# Patient Record
Sex: FEMALE | Race: BLACK OR AFRICAN AMERICAN | NOT HISPANIC OR LATINO | Employment: PART TIME | URBAN - METROPOLITAN AREA
[De-identification: names, ages, dates, MRNs, and addresses within clinical notes are randomized per-mention and may not be internally consistent; named-entity substitution may affect disease eponyms.]

---

## 2020-09-15 ENCOUNTER — HISTORICAL (OUTPATIENT)
Dept: ADMINISTRATIVE | Facility: HOSPITAL | Age: 50
End: 2020-09-15

## 2020-09-15 LAB
ABS NEUT (OLG): 6.07 X10(3)/MCL (ref 2.1–9.2)
ALBUMIN SERPL-MCNC: 3.5 GM/DL (ref 3.4–5)
ALBUMIN/GLOB SERPL: 0.8 RATIO (ref 1.1–2)
ALP SERPL-CCNC: 89 UNIT/L (ref 45–117)
ALT SERPL-CCNC: 21 UNIT/L (ref 12–78)
APPEARANCE, UA: CLEAR
AST SERPL-CCNC: 16 UNIT/L (ref 15–37)
BACTERIA #/AREA URNS AUTO: ABNORMAL /HPF
BASOPHILS # BLD AUTO: 0 X10(3)/MCL (ref 0–0.2)
BASOPHILS NFR BLD AUTO: 0 %
BILIRUB SERPL-MCNC: 0.3 MG/DL (ref 0.2–1)
BILIRUB UR QL STRIP: NEGATIVE
BILIRUBIN DIRECT+TOT PNL SERPL-MCNC: 0.1 MG/DL (ref 0–0.2)
BILIRUBIN DIRECT+TOT PNL SERPL-MCNC: 0.2 MG/DL
BUN SERPL-MCNC: 9 MG/DL (ref 7–18)
CALCIUM SERPL-MCNC: 8.8 MG/DL (ref 8.5–10.1)
CHLORIDE SERPL-SCNC: 106 MMOL/L (ref 98–107)
CHOLEST SERPL-MCNC: 202 MG/DL
CHOLEST/HDLC SERPL: 4 {RATIO} (ref 0–4.4)
CO2 SERPL-SCNC: 26 MMOL/L (ref 21–32)
COLOR UR: NORMAL
CREAT SERPL-MCNC: 0.8 MG/DL (ref 0.6–1.3)
EOSINOPHIL # BLD AUTO: 0.2 X10(3)/MCL (ref 0–0.9)
EOSINOPHIL NFR BLD AUTO: 2 %
ERYTHROCYTE [DISTWIDTH] IN BLOOD BY AUTOMATED COUNT: 14.5 % (ref 11.5–14.5)
EST. AVERAGE GLUCOSE BLD GHB EST-MCNC: 126 MG/DL
GLOBULIN SER-MCNC: 4.3 GM/ML (ref 2.3–3.5)
GLUCOSE (UA): NEGATIVE
GLUCOSE SERPL-MCNC: 114 MG/DL (ref 74–106)
HAV IGM SERPL QL IA: NONREACTIVE
HBA1C MFR BLD: 6 % (ref 4.2–6.3)
HBV CORE IGM SERPL QL IA: NONREACTIVE
HBV SURFACE AG SERPL QL IA: NONREACTIVE
HCT VFR BLD AUTO: 41.3 % (ref 35–46)
HCV AB SERPL QL IA: NONREACTIVE
HDLC SERPL-MCNC: 50 MG/DL (ref 40–59)
HGB BLD-MCNC: 13.2 GM/DL (ref 12–16)
HGB UR QL STRIP: NEGATIVE
HIV 1+2 AB+HIV1 P24 AG SERPL QL IA: NONREACTIVE
HYALINE CASTS #/AREA URNS LPF: ABNORMAL /LPF
IMM GRANULOCYTES # BLD AUTO: 0.03 10*3/UL
IMM GRANULOCYTES NFR BLD AUTO: 0 %
KETONES UR QL STRIP: NEGATIVE
LDLC SERPL CALC-MCNC: 126 MG/DL
LEUKOCYTE ESTERASE UR QL STRIP: NEGATIVE
LYMPHOCYTES # BLD AUTO: 3.1 X10(3)/MCL (ref 0.6–4.6)
LYMPHOCYTES NFR BLD AUTO: 31 %
MCH RBC QN AUTO: 27.1 PG (ref 26–34)
MCHC RBC AUTO-ENTMCNC: 32 GM/DL (ref 31–37)
MCV RBC AUTO: 84.8 FL (ref 80–100)
MONOCYTES # BLD AUTO: 0.5 X10(3)/MCL (ref 0.1–1.3)
MONOCYTES NFR BLD AUTO: 5 %
NEUTROPHILS # BLD AUTO: 6.07 X10(3)/MCL (ref 2.1–9.2)
NEUTROPHILS NFR BLD AUTO: 61 %
NITRITE UR QL STRIP: NEGATIVE
PH UR STRIP: 5.5 [PH] (ref 4.5–8)
PLATELET # BLD AUTO: 219 X10(3)/MCL (ref 130–400)
PMV BLD AUTO: 11.5 FL (ref 7.4–10.4)
POTASSIUM SERPL-SCNC: 4.2 MMOL/L (ref 3.5–5.1)
PROT SERPL-MCNC: 7.8 GM/DL (ref 6.4–8.2)
PROT UR QL STRIP: NEGATIVE
RBC # BLD AUTO: 4.87 X10(6)/MCL (ref 4–5.2)
RBC #/AREA URNS AUTO: ABNORMAL /HPF
SODIUM SERPL-SCNC: 140 MMOL/L (ref 136–145)
SP GR UR STRIP: 1.01 (ref 1–1.03)
SQUAMOUS #/AREA URNS LPF: ABNORMAL /LPF
T PALLIDUM AB SER QL: NONREACTIVE
TRIGL SERPL-MCNC: 132 MG/DL
TSH SERPL-ACNC: 1.63 MIU/L (ref 0.36–3.74)
UROBILINOGEN UR STRIP-ACNC: NORMAL
VLDLC SERPL CALC-MCNC: 26 MG/DL
WBC # SPEC AUTO: 9.9 X10(3)/MCL (ref 4.5–11)
WBC #/AREA URNS AUTO: ABNORMAL /HPF

## 2020-12-01 ENCOUNTER — HISTORICAL (OUTPATIENT)
Dept: RADIOLOGY | Facility: HOSPITAL | Age: 50
End: 2020-12-01

## 2020-12-01 LAB
APPEARANCE, UA: CLEAR
BACTERIA #/AREA URNS AUTO: ABNORMAL /HPF
BILIRUB UR QL STRIP: NEGATIVE
BUN SERPL-MCNC: 10 MG/DL (ref 7–18.7)
CALCIUM SERPL-MCNC: 9.4 MG/DL (ref 8.4–10.2)
CHLORIDE SERPL-SCNC: 105 MMOL/L (ref 98–107)
CO2 SERPL-SCNC: 25 MMOL/L (ref 22–29)
COLOR UR: ABNORMAL
CREAT SERPL-MCNC: 0.85 MG/DL (ref 0.55–1.02)
CREAT/UREA NIT SERPL: 12
GLUCOSE (UA): NEGATIVE
GLUCOSE SERPL-MCNC: 113 MG/DL (ref 74–100)
HGB UR QL STRIP: NEGATIVE
HYALINE CASTS #/AREA URNS LPF: ABNORMAL /LPF
KETONES UR QL STRIP: NEGATIVE
LEUKOCYTE ESTERASE UR QL STRIP: 75 LEU/UL
NITRITE UR QL STRIP: NEGATIVE
PH UR STRIP: 5.5 [PH] (ref 4.5–8)
POTASSIUM SERPL-SCNC: 4.3 MMOL/L (ref 3.5–5.1)
PROT UR QL STRIP: NEGATIVE
RBC #/AREA URNS AUTO: ABNORMAL /HPF
SODIUM SERPL-SCNC: 138 MMOL/L (ref 136–145)
SP GR UR STRIP: 1.02 (ref 1–1.03)
SQUAMOUS #/AREA URNS LPF: >100 /LPF
UROBILINOGEN UR STRIP-ACNC: NORMAL
WBC #/AREA URNS AUTO: ABNORMAL /HPF

## 2020-12-03 LAB — FINAL CULTURE: NORMAL

## 2021-02-24 LAB
CHLAMYDIA TRACHOMATIS (PRECISION): NEGATIVE
HIGH RISK HPV 16 (PRECISION): NEGATIVE
HIGH RISK HPV 18/45 (PRECISION): NEGATIVE
NEISSERIA GONORRHOEAE (PRECISION): NEGATIVE
PAP RECOMMENDATION EXT: NORMAL
PAP SMEAR: NORMAL

## 2021-03-19 ENCOUNTER — HISTORICAL (OUTPATIENT)
Dept: RADIOLOGY | Facility: HOSPITAL | Age: 51
End: 2021-03-19

## 2021-06-01 ENCOUNTER — HISTORICAL (OUTPATIENT)
Dept: ADMINISTRATIVE | Facility: HOSPITAL | Age: 51
End: 2021-06-01

## 2021-06-01 LAB
ABS NEUT (OLG): 4.85 X10(3)/MCL (ref 2.1–9.2)
BASOPHILS # BLD AUTO: 0 X10(3)/MCL (ref 0–0.2)
BASOPHILS NFR BLD AUTO: 0 %
BUN SERPL-MCNC: 9.5 MG/DL (ref 9.8–20.1)
CALCIUM SERPL-MCNC: 9.2 MG/DL (ref 8.4–10.2)
CHLORIDE SERPL-SCNC: 106 MMOL/L (ref 98–107)
CO2 SERPL-SCNC: 25 MMOL/L (ref 22–29)
CREAT SERPL-MCNC: 0.84 MG/DL (ref 0.55–1.02)
CREAT/UREA NIT SERPL: 11
EOSINOPHIL # BLD AUTO: 0.2 X10(3)/MCL (ref 0–0.9)
EOSINOPHIL NFR BLD AUTO: 3 %
ERYTHROCYTE [DISTWIDTH] IN BLOOD BY AUTOMATED COUNT: 13.8 % (ref 11.5–14.5)
GLUCOSE SERPL-MCNC: 151 MG/DL (ref 74–100)
HCT VFR BLD AUTO: 40.5 % (ref 35–46)
HGB BLD-MCNC: 13.2 GM/DL (ref 12–16)
IMM GRANULOCYTES # BLD AUTO: 0.01 10*3/UL
IMM GRANULOCYTES NFR BLD AUTO: 0 %
LYMPHOCYTES # BLD AUTO: 3.6 X10(3)/MCL (ref 0.6–4.6)
LYMPHOCYTES NFR BLD AUTO: 39 %
MCH RBC QN AUTO: 27.7 PG (ref 26–34)
MCHC RBC AUTO-ENTMCNC: 32.6 GM/DL (ref 31–37)
MCV RBC AUTO: 85.1 FL (ref 80–100)
MONOCYTES # BLD AUTO: 0.5 X10(3)/MCL (ref 0.1–1.3)
MONOCYTES NFR BLD AUTO: 5 %
NEUTROPHILS # BLD AUTO: 4.85 X10(3)/MCL (ref 2.1–9.2)
NEUTROPHILS NFR BLD AUTO: 53 %
NRBC BLD AUTO-RTO: 0 % (ref 0–0.2)
PLATELET # BLD AUTO: 262 X10(3)/MCL (ref 130–400)
PMV BLD AUTO: 10.1 FL (ref 7.4–10.4)
POTASSIUM SERPL-SCNC: 3.9 MMOL/L (ref 3.5–5.1)
RBC # BLD AUTO: 4.76 X10(6)/MCL (ref 4–5.2)
SODIUM SERPL-SCNC: 140 MMOL/L (ref 136–145)
TSH SERPL-ACNC: 2.55 UIU/ML (ref 0.35–4.94)
WBC # SPEC AUTO: 9.2 X10(3)/MCL (ref 4.5–11)

## 2021-06-09 ENCOUNTER — HISTORICAL (OUTPATIENT)
Dept: INTERNAL MEDICINE | Facility: CLINIC | Age: 51
End: 2021-06-09

## 2021-06-09 LAB
APPEARANCE, UA: ABNORMAL
BACTERIA #/AREA URNS AUTO: ABNORMAL /HPF
BILIRUB UR QL STRIP: NEGATIVE
COLOR UR: ABNORMAL
EST. AVERAGE GLUCOSE BLD GHB EST-MCNC: 128.4 MG/DL
GLUCOSE (UA): NEGATIVE
HBA1C MFR BLD: 6.1 %
HGB UR QL STRIP: 0.03 MG/DL
HYALINE CASTS #/AREA URNS LPF: ABNORMAL /LPF
KETONES UR QL STRIP: NEGATIVE
LEUKOCYTE ESTERASE UR QL STRIP: 500 LEU/UL
NITRITE UR QL STRIP: NEGATIVE
PH UR STRIP: 5.5 [PH] (ref 4.5–8)
PROT UR QL STRIP: NEGATIVE
RBC #/AREA URNS AUTO: ABNORMAL /HPF
SP GR UR STRIP: 1.01 (ref 1–1.03)
SQUAMOUS #/AREA URNS LPF: ABNORMAL /LPF
UROBILINOGEN UR STRIP-ACNC: NORMAL
WBC #/AREA URNS AUTO: ABNORMAL /HPF

## 2021-06-11 LAB — FINAL CULTURE: NORMAL

## 2021-06-16 LAB — POC BETA-HCG (QUAL): NEGATIVE

## 2021-09-13 ENCOUNTER — HISTORICAL (OUTPATIENT)
Dept: ADMINISTRATIVE | Facility: HOSPITAL | Age: 51
End: 2021-09-13

## 2021-09-13 LAB — SARS-COV-2 RNA RESP QL NAA+PROBE: NOT DETECTED

## 2021-10-01 ENCOUNTER — HISTORICAL (OUTPATIENT)
Dept: INTERNAL MEDICINE | Facility: CLINIC | Age: 51
End: 2021-10-01

## 2021-10-01 LAB
ABS NEUT (OLG): 5.58 X10(3)/MCL (ref 2.1–9.2)
ALBUMIN SERPL-MCNC: 3.7 GM/DL (ref 3.5–5)
ALBUMIN/GLOB SERPL: 0.9 RATIO (ref 1.1–2)
ALP SERPL-CCNC: 90 UNIT/L (ref 40–150)
ALT SERPL-CCNC: 14 UNIT/L (ref 0–55)
APPEARANCE, UA: NORMAL
AST SERPL-CCNC: 15 UNIT/L (ref 5–34)
BACTERIA #/AREA URNS AUTO: ABNORMAL /HPF
BASOPHILS # BLD AUTO: 0 X10(3)/MCL (ref 0–0.2)
BASOPHILS NFR BLD AUTO: 0 %
BILIRUB SERPL-MCNC: 0.4 MG/DL
BILIRUB UR QL STRIP: NEGATIVE
BILIRUBIN DIRECT+TOT PNL SERPL-MCNC: 0.2 MG/DL (ref 0–0.5)
BILIRUBIN DIRECT+TOT PNL SERPL-MCNC: 0.2 MG/DL (ref 0–0.8)
BUN SERPL-MCNC: 8.5 MG/DL (ref 9.8–20.1)
CALCIUM SERPL-MCNC: 9.4 MG/DL (ref 8.4–10.2)
CHLORIDE SERPL-SCNC: 108 MMOL/L (ref 98–107)
CHOLEST SERPL-MCNC: 199 MG/DL
CHOLEST/HDLC SERPL: 4 {RATIO} (ref 0–5)
CO2 SERPL-SCNC: 24 MMOL/L (ref 22–29)
COLOR UR: NORMAL
CREAT SERPL-MCNC: 0.85 MG/DL (ref 0.55–1.02)
EOSINOPHIL # BLD AUTO: 0.2 X10(3)/MCL (ref 0–0.9)
EOSINOPHIL NFR BLD AUTO: 2 %
ERYTHROCYTE [DISTWIDTH] IN BLOOD BY AUTOMATED COUNT: 13.9 % (ref 11.5–14.5)
EST. AVERAGE GLUCOSE BLD GHB EST-MCNC: 137 MG/DL
GLOBULIN SER-MCNC: 4.2 GM/DL (ref 2.4–3.5)
GLUCOSE (UA): NEGATIVE
GLUCOSE SERPL-MCNC: 127 MG/DL (ref 74–100)
HBA1C MFR BLD: 6.4 %
HCT VFR BLD AUTO: 43.2 % (ref 35–46)
HDLC SERPL-MCNC: 45 MG/DL (ref 35–60)
HGB BLD-MCNC: 14 GM/DL (ref 12–16)
HGB UR QL STRIP: NEGATIVE
HYALINE CASTS #/AREA URNS LPF: ABNORMAL /LPF
IMM GRANULOCYTES # BLD AUTO: 0.02 10*3/UL
IMM GRANULOCYTES NFR BLD AUTO: 0 %
KETONES UR QL STRIP: NEGATIVE
LDLC SERPL CALC-MCNC: 130 MG/DL (ref 50–140)
LEUKOCYTE ESTERASE UR QL STRIP: NEGATIVE
LYMPHOCYTES # BLD AUTO: 3.1 X10(3)/MCL (ref 0.6–4.6)
LYMPHOCYTES NFR BLD AUTO: 33 %
MCH RBC QN AUTO: 27.6 PG (ref 26–34)
MCHC RBC AUTO-ENTMCNC: 32.4 GM/DL (ref 31–37)
MCV RBC AUTO: 85 FL (ref 80–100)
MONOCYTES # BLD AUTO: 0.5 X10(3)/MCL (ref 0.1–1.3)
MONOCYTES NFR BLD AUTO: 5 %
NEUTROPHILS # BLD AUTO: 5.58 X10(3)/MCL (ref 2.1–9.2)
NEUTROPHILS NFR BLD AUTO: 60 %
NITRITE UR QL STRIP: NEGATIVE
NRBC BLD AUTO-RTO: 0 % (ref 0–0.2)
PH UR STRIP: 5.5 [PH] (ref 4.5–8)
PLATELET # BLD AUTO: 254 X10(3)/MCL (ref 130–400)
PMV BLD AUTO: 10.4 FL (ref 7.4–10.4)
POTASSIUM SERPL-SCNC: 4.1 MMOL/L (ref 3.5–5.1)
PROT SERPL-MCNC: 7.9 GM/DL (ref 6.4–8.3)
PROT UR QL STRIP: NEGATIVE
RBC # BLD AUTO: 5.08 X10(6)/MCL (ref 4–5.2)
RBC #/AREA URNS AUTO: ABNORMAL /HPF
SODIUM SERPL-SCNC: 138 MMOL/L (ref 136–145)
SP GR UR STRIP: 1.01 (ref 1–1.03)
SQUAMOUS #/AREA URNS LPF: >100 /LPF
TRIGL SERPL-MCNC: 122 MG/DL (ref 37–140)
TSH SERPL-ACNC: 1.44 UIU/ML (ref 0.35–4.94)
UROBILINOGEN UR STRIP-ACNC: NORMAL
VLDLC SERPL CALC-MCNC: 24 MG/DL
WBC # SPEC AUTO: 9.4 X10(3)/MCL (ref 4.5–11)
WBC #/AREA URNS AUTO: ABNORMAL /HPF

## 2021-10-03 LAB — FINAL CULTURE: NO GROWTH

## 2021-12-06 ENCOUNTER — HISTORICAL (OUTPATIENT)
Dept: RADIOLOGY | Facility: HOSPITAL | Age: 51
End: 2021-12-06

## 2022-01-11 ENCOUNTER — HISTORICAL (OUTPATIENT)
Dept: ADMINISTRATIVE | Facility: HOSPITAL | Age: 52
End: 2022-01-11

## 2022-01-11 LAB
ABS NEUT (OLG): 4.92 X10(3)/MCL (ref 2.1–9.2)
ALBUMIN SERPL-MCNC: 3.7 GM/DL (ref 3.5–5)
ALBUMIN/GLOB SERPL: 0.8 RATIO (ref 1.1–2)
ALP SERPL-CCNC: 101 UNIT/L (ref 40–150)
ALT SERPL-CCNC: 26 UNIT/L (ref 0–55)
AST SERPL-CCNC: 21 UNIT/L (ref 5–34)
BASOPHILS # BLD AUTO: 0 X10(3)/MCL (ref 0–0.2)
BASOPHILS NFR BLD AUTO: 0 %
BILIRUB SERPL-MCNC: 0.3 MG/DL
BILIRUBIN DIRECT+TOT PNL SERPL-MCNC: 0.1 MG/DL (ref 0–0.5)
BILIRUBIN DIRECT+TOT PNL SERPL-MCNC: 0.2 MG/DL (ref 0–0.8)
BUN SERPL-MCNC: 8.1 MG/DL (ref 9.8–20.1)
CALCIUM SERPL-MCNC: 9.6 MG/DL (ref 8.7–10.5)
CHLORIDE SERPL-SCNC: 106 MMOL/L (ref 98–107)
CHOLEST SERPL-MCNC: 207 MG/DL
CHOLEST/HDLC SERPL: 4 {RATIO} (ref 0–5)
CO2 SERPL-SCNC: 26 MMOL/L (ref 22–29)
CREAT SERPL-MCNC: 0.81 MG/DL (ref 0.55–1.02)
EOSINOPHIL # BLD AUTO: 0.2 X10(3)/MCL (ref 0–0.9)
EOSINOPHIL NFR BLD AUTO: 2 %
ERYTHROCYTE [DISTWIDTH] IN BLOOD BY AUTOMATED COUNT: 14.2 % (ref 11.5–14.5)
EST CREAT CLEARANCE SER (OHS): 80.59 ML/MIN
EST. AVERAGE GLUCOSE BLD GHB EST-MCNC: 142.7 MG/DL
GLOBULIN SER-MCNC: 4.5 GM/DL (ref 2.4–3.5)
GLUCOSE SERPL-MCNC: 123 MG/DL (ref 74–100)
HAV IGM SERPL QL IA: NONREACTIVE
HBA1C MFR BLD: 6.6 %
HBV CORE IGM SERPL QL IA: NONREACTIVE
HBV SURFACE AG SERPL QL IA: NONREACTIVE
HCT VFR BLD AUTO: 42.4 % (ref 35–46)
HCV AB SERPL QL IA: NONREACTIVE
HDLC SERPL-MCNC: 49 MG/DL (ref 35–60)
HGB BLD-MCNC: 13.4 GM/DL (ref 12–16)
HIV 1+2 AB+HIV1 P24 AG SERPL QL IA: NONREACTIVE
IMM GRANULOCYTES # BLD AUTO: 0.02 10*3/UL
IMM GRANULOCYTES NFR BLD AUTO: 0 %
LDLC SERPL CALC-MCNC: 126 MG/DL (ref 50–140)
LYMPHOCYTES # BLD AUTO: 3.7 X10(3)/MCL (ref 0.6–4.6)
LYMPHOCYTES NFR BLD AUTO: 40 %
MCH RBC QN AUTO: 27.1 PG (ref 26–34)
MCHC RBC AUTO-ENTMCNC: 31.6 GM/DL (ref 31–37)
MCV RBC AUTO: 85.7 FL (ref 80–100)
MONOCYTES # BLD AUTO: 0.5 X10(3)/MCL (ref 0.1–1.3)
MONOCYTES NFR BLD AUTO: 5 %
NEUTROPHILS # BLD AUTO: 4.92 X10(3)/MCL (ref 2.1–9.2)
NEUTROPHILS NFR BLD AUTO: 53 %
NRBC BLD AUTO-RTO: 0 % (ref 0–0.2)
PLATELET # BLD AUTO: 238 X10(3)/MCL (ref 130–400)
PMV BLD AUTO: 11.1 FL (ref 7.4–10.4)
POTASSIUM SERPL-SCNC: 4 MMOL/L (ref 3.5–5.1)
PROT SERPL-MCNC: 8.2 GM/DL (ref 6.4–8.3)
RBC # BLD AUTO: 4.95 X10(6)/MCL (ref 4–5.2)
SODIUM SERPL-SCNC: 137 MMOL/L (ref 136–145)
T PALLIDUM AB SER QL: NONREACTIVE
TRIGL SERPL-MCNC: 158 MG/DL (ref 37–140)
VLDLC SERPL CALC-MCNC: 32 MG/DL
WBC # SPEC AUTO: 9.3 X10(3)/MCL (ref 4.5–11)

## 2022-04-04 ENCOUNTER — HISTORICAL (OUTPATIENT)
Dept: ADMINISTRATIVE | Facility: HOSPITAL | Age: 52
End: 2022-04-04

## 2022-04-04 LAB
CHOLEST SERPL-MCNC: 168 MG/DL
CHOLEST/HDLC SERPL: 4 {RATIO} (ref 0–5)
EST. AVERAGE GLUCOSE BLD GHB EST-MCNC: 137 MG/DL
HBA1C MFR BLD: 6.4 %
HDLC SERPL-MCNC: 48 MG/DL (ref 35–60)
LDLC SERPL CALC-MCNC: 102 MG/DL (ref 50–140)
TRIGL SERPL-MCNC: 91 MG/DL (ref 37–140)
VLDLC SERPL CALC-MCNC: 18 MG/DL

## 2022-04-10 ENCOUNTER — HISTORICAL (OUTPATIENT)
Dept: ADMINISTRATIVE | Facility: HOSPITAL | Age: 52
End: 2022-04-10
Payer: MEDICAID

## 2022-04-26 VITALS
DIASTOLIC BLOOD PRESSURE: 84 MMHG | HEIGHT: 67 IN | BODY MASS INDEX: 40.73 KG/M2 | WEIGHT: 259.5 LBS | SYSTOLIC BLOOD PRESSURE: 130 MMHG

## 2022-05-02 NOTE — HISTORICAL [facility] CERNER
This is a historical note converted from Deborah. Formatting and pictures may have been removed.  Please reference Deborah for original formatting and attached multimedia. Chief Complaint  Need PCP  History of Present Illness  Pt is a?50 Years?old?AA Female here to establish PCP in [facility] [place], previously seeing Dr. Ashely Garcia (no longer in practice) for primary care.?PMH HTN, constipation?and obesity. Does not take any meds daily.?States was prescribed BP med last year; states was ~ 290 lbs at that time, so she began?following?dash diet?and walking for?exercise 3-4x/week. BP at goal today.?States takes linzess?QOD or dulcolax for BMs. Reports starting to have hot flashes, mood swings and irregular menses over the past year; which are tolerable. Pt last saw GYN, at [facility] in 2018, and last MMG was 7/18/19 at [facility]. Denies chest pain, shortness of breath,?cough, fever, headache,?dizziness, weakness, abdominal pain, nausea,?vomiting, diarrhea, dysuria, depression, anxiety, SI/HI.  Review of Systems  Constitutional: negative except as stated in HPI  Eye: negative except as stated in HPI  ENT: negative except as stated in HPI  Respiratory: negative except as stated in HPI  Cardiovascular: negative except as stated in HPI  Gastrointestinal: negative except as stated in HPI  Genitourinary: negative except as stated in HPI  Heme/Lymph: negative except as stated in HPI  Endocrine: negative except as stated in HPI  Immunologic: negative except as stated in HPI  Musculoskeletal: negative except as stated in HPI  Integumentary: negative except as stated in HPI  Neurologic: negative except as stated in HPI  ?   All Other ROS_ negative except as stated in HPI  Physical Exam  Vitals & Measurements  T:?36.7? ?C (Oral)? HR:?71(Peripheral)? RR:?18? BP:?128/88?  HT:?170.00?cm? WT:?105.200?kg? BMI:?36.4? LMP:?09/01/2020 00:00 CDT?  General: Alert and oriented, No acute distress.  Head: Normocephalic, Atraumatic.  Eye: Pupils are equal, round  and reactive to light, Extraocular movements are intact, Normal conjunctiva, Sclera non-icteric.  Ears/Nose/Mouth/Throat: Normal hearing, Oral mucosa is moist, No pharyngeal erythema.  Neck/Thyroid: Supple, Non-tender, No lymphadenopathy, No thyromegaly, No carotid bruit, No JVD, Full range of motion.  Respiratory: Clear to auscultation bilaterally, Non-labored Respirations, Breath sounds are equal, Symmetrical chest wall expansion, No wheezes, rales or rhonchi.  Cardiovascular: Regular rate and rhythm, Normal S1/S2, No murmurs, rubs or gallops.?Normal peripheral perfusion, No edema.  Gastrointestinal: Soft, Non-tender, Non-distended, Normal bowel sounds, No palpable organomegaly.  Genitourinary: No costovertebral angle tenderness, No inguinal tenderness.  Musculoskeletal: Normal range of motion, Normal strength, No tenderness, Normal gait.  Integumentary: Warm, Dry, Intact, No suspicious lesions or rashes.  Neurologic: No focal deficits, Cranial Nerves II-XII are grossly intact, Motor strength normal upper and lower extremities, Sensory exam intact.  Psychiatric: Normal interaction, Coherent speech, Euthymic mood, Appropriate affect.  Feet: 2+ pedal pulses bilaterally.  Assessment/Plan  1.?Obesity, Class I, BMI 30-34.9?E66.9  ?BMI 36. Goal BMI <30.  Aerobic exercise 150 minutes per week.  Avoid soda, simple sugars, sweets, excessive rice, pasta, potatoes or bread.?  Choose brown options when available and portion control.  Limit fast foods and fried foods.?  Choose complex carbs in moderation (ex: green, leafy vegetables, beans, oatmeal).  Eat plenty of fresh fruits and vegetables with lean meats daily.?  Consider permanent healthy lifestyle changes.  Ordered:  1160F- Medication reconciliation completed during visit, Obesity, Class I, BMI 30-34.9  Wellness examination  Screening cholesterol level  Thyroid disorder screen  Screening for malignant neoplasm of breast  Screening for diabetes mellitus  Need for  vaccination  Routine screening for STI (sexually tr...  Clinic Follow up, *Est. 03/15/21 8:00:00 CDT, Order for future visit, Obesity, Class I, BMI 30-34.9, [facility]  Office/Outpatient Visit Level 4 Established 23710 PC, Obesity, Class I, BMI 30-34.9  Wellness examination  Screening cholesterol level  Thyroid disorder screen  Screening for malignant neoplasm of breast  Screening for diabetes mellitus  Need for vaccination  Routine screening for STI (sexually tr...  ?  2.?Wellness examination?Z00.00  ?Cervical Cancer Screening?-?Last Pap in 1995 with tubal.??Follow up?with GYN Clinic for annual?Pap/Pelvic.  Breast Cancer?Screening -?Has never had MMG. MMG ordered.?Follow up annually.  Colon Cancer Screening -?FIT at age 51. Follow up annually.  Vaccinations: ?Flu - / Tetanus?- UTD (9/15/20)  Labwork -?ordered; pt is fasting  Ordered:  1160F- Medication reconciliation completed during visit, Obesity, Class I, BMI 30-34.9  Wellness examination  Screening cholesterol level  Thyroid disorder screen  Screening for malignant neoplasm of breast  Screening for diabetes mellitus  Need for vaccination  Routine screening for STI (sexually tr...  Office/Outpatient Visit Level 4 Established 13579 PC, Obesity, Class I, BMI 30-34.9  Wellness examination  Screening cholesterol level  Thyroid disorder screen  Screening for malignant neoplasm of breast  Screening for diabetes mellitus  Need for vaccination  Routine screening for STI (sexually tr...  ?  3.?Screening cholesterol level?Z13.220  ?FLP ordered.  Ordered:  1160F- Medication reconciliation completed during visit, Obesity, Class I, BMI 30-34.9  Wellness examination  Screening cholesterol level  Thyroid disorder screen  Screening for malignant neoplasm of breast  Screening for diabetes mellitus  Need for vaccination  Routine screening for STI (sexually tr...  Office/Outpatient Visit Level 4 Established 85663 PC, Obesity, Class I, BMI 30-34.9   Wellness examination  Screening cholesterol level  Thyroid disorder screen  Screening for malignant neoplasm of breast  Screening for diabetes mellitus  Need for vaccination  Routine screening for STI (sexually tr...  ?  4.?Thyroid disorder screen?Z13.29  ?TSH ordered.  Ordered:  1160F- Medication reconciliation completed during visit, Obesity, Class I, BMI 30-34.9  Wellness examination  Screening cholesterol level  Thyroid disorder screen  Screening for malignant neoplasm of breast  Screening for diabetes mellitus  Need for vaccination  Routine screening for STI (sexually tr...  Office/Outpatient Visit Level 4 Established 43638 PC, Obesity, Class I, BMI 30-34.9  Wellness examination  Screening cholesterol level  Thyroid disorder screen  Screening for malignant neoplasm of breast  Screening for diabetes mellitus  Need for vaccination  Routine screening for STI (sexually tr...  ?  5.?Need for vaccination?Z23  ?Tetanus given.  Ordered:  1160F- Medication reconciliation completed during visit, Obesity, Class I, BMI 30-34.9  Wellness examination  Screening cholesterol level  Thyroid disorder screen  Screening for malignant neoplasm of breast  Screening for diabetes mellitus  Need for vaccination  Routine screening for STI (sexually tr...  Office/Outpatient Visit Level 4 Established 80140 PC, Obesity, Class I, BMI 30-34.9  Wellness examination  Screening cholesterol level  Thyroid disorder screen  Screening for malignant neoplasm of breast  Screening for diabetes mellitus  Need for vaccination  Routine screening for STI (sexually tr...  ?  6.?Screening for diabetes mellitus?Z13.1  ?A1C ordered.  Ordered:  1160F- Medication reconciliation completed during visit, Obesity, Class I, BMI 30-34.9  Wellness examination  Screening cholesterol level  Thyroid disorder screen  Screening for malignant neoplasm of breast  Screening for diabetes mellitus  Need for vaccination  Routine screening  for STI (sexually tr...  Office/Outpatient Visit Level 4 Established 46814 PC, Obesity, Class I, BMI 30-34.9  Wellness examination  Screening cholesterol level  Thyroid disorder screen  Screening for malignant neoplasm of breast  Screening for diabetes mellitus  Need for vaccination  Routine screening for STI (sexually tr...  ?  7.?Screening for malignant neoplasm of breast?Z12.39,?Screening for malignant neoplasm of breast?Z12.39  ?MMG ordered.  Ordered:  1160F- Medication reconciliation completed during visit, Obesity, Class I, BMI 30-34.9  Wellness examination  Screening cholesterol level  Thyroid disorder screen  Screening for malignant neoplasm of breast  Screening for diabetes mellitus  Need for vaccination  Routine screening for STI (sexually tr...  MG Screening Bilateral, Routine, *Est. 11/15/20 3:00:00 CST, Screening, None, Ambulatory, Rad Type, Order for future visit, Screening for malignant neoplasm of breast, Schedule this test, [facility], Follow Breast Imaging Order Set Protocol, *Est. 11/15/...  Office/Outpatient Visit Level 4 Established 31699 PC, Obesity, Class I, BMI 30-34.9  Wellness examination  Screening cholesterol level  Thyroid disorder screen  Screening for malignant neoplasm of breast  Screening for diabetes mellitus  Need for vaccination  Routine screening for STI (sexually tr...  ?  8.?Routine screening for STI (sexually transmitted infection)?Z11.3  ?STI labs ordered.  Ordered:  1160F- Medication reconciliation completed during visit, Obesity, Class I, BMI 30-34.9  Wellness examination  Screening cholesterol level  Thyroid disorder screen  Screening for malignant neoplasm of breast  Screening for diabetes mellitus  Need for vaccination  Routine screening for STI (sexually tr...  Office/Outpatient Visit Level 4 Established 69307 PC, Obesity, Class I, BMI 30-34.9  Wellness examination  Screening cholesterol level  Thyroid disorder screen   Screening for malignant neoplasm of breast  Screening for diabetes mellitus  Need for vaccination  Routine screening for STI (sexually tr...  ?  9.?Constipation?K59.00  ?Refilled linzess 290 mg daily.  Take stool softener daily.  Educated on high fiber diet (Benefiber to drinks) and exercise.  Drink at least 64 ozs of water daily.  Eat hot breakfast q am.  Ordered:  Clinic Follow up, *Est. 03/15/21 8:00:00 CDT, Order for future visit, Obesity, Class I, BMI 30-34.9, [facility]  ?  Orders:  linaclotide, 290 mcg = 1 cap(s), Oral, Daily, # 30 cap(s), 5 Refill(s), Pharmacy: [facility] #[id number], 170, cm, Height/Length Dosing, 09/15/20 9:31:00 CDT, 105.2, kg, Weight Dosing, 09/15/20 9:31:00 CDT  Will call with abn lab results.  RTC 1 yr and prn. Labs one week prior to appt.  Referrals  [facility] Internal Referral to Gynecology Clinic, Specialty: Gynecology, Reason: wellness exam, Refer To: Provider Not Specified, [facility] , [address] [place] [address]., Start: 09/15/20 9:36:00 CDT  Clinic Follow up, *Est. 03/15/21 8:00:00 CDT, Order for future visit, Obesity, Class I, BMI 30-34.9, [facility]   Problem List/Past Medical History  Ongoing  Obesity, Class I, BMI 30-34.9  Historical  Hypertension  Procedure/Surgical History  Carpal tunnel release (2018)  Tubal ligation (1998)   Medications  Linzess 290 mcg oral capsule, 290 mcg= 1 cap(s), Oral, Daily, 5 refills  Allergies  No Known Allergies  Social History  Abuse/Neglect  No, No, Yes, 09/15/2020  Alcohol  Current, Wine, 1-2 times per year, 09/15/2020  Employment/School  Unemployed, Highest education level: High school. No, 09/15/2020  Exercise  Exercise duration: 45. Exercise frequency: 3-4 times/week. Exercise type: Walking., 09/15/2020  Home/Environment  Lives with grandbaby. Living situation: Home/Independent. TV/Computer concerns: No. Single family house, 09/15/2020    Never in ,  09/15/2020  Nutrition/Health  Regular, Good, 09/15/2020  Sexual  Sexual orientation: Straight or heterosexual., 09/15/2020  Spiritual/Cultural  Jew, 09/15/2020  Substance Use  Never, 06/24/2016  Tobacco  Never (less than 100 in lifetime), No, Household tobacco concerns: No. Smokeless Tobacco Use: Never., 09/15/2020  Family History  Congestive heart disease.: Mother.  Diabetes: Mother, Sister and Brother.  Immunizations  Vaccine Date Status   tetanus/diphtheria/pertussis, acel(Tdap) 09/15/2020 Given   Health Maintenance  Health Maintenance  ???Pending?(in the next year)  ???There are no current recommendations pending  ??? ??Due In Future?  ??? ? ? ?Obesity Screening not due until??01/01/21??and every 1??year(s)  ??? ? ? ?Alcohol Misuse Screening not due until??01/02/21??and every 1??year(s)  ???Satisfied?(in the past 1 year)  ??? ??Satisfied?  ??? ? ? ?ADL Screening on??09/15/20.??Satisfied by Day HILL, Magali S.  ??? ? ? ?Alcohol Misuse Screening on??09/15/20.??Satisfied by Brianna Srinivasan NP  ??? ? ? ?Blood Pressure Screening on??09/15/20.??Satisfied by Day LPN, Magali S.  ??? ? ? ?Body Mass Index Check on??09/15/20.??Satisfied by Day MARGARITON, Magali S.  ??? ? ? ?Depression Screening on??09/15/20.??Satisfied by Day LPN, Magali S.  ??? ? ? ?Diabetes Screening on??09/15/20.??Satisfied by Nahomy Evangelista  ??? ? ? ?Lipid Screening on??09/15/20.??Satisfied by Nahomy Evangelista  ??? ? ? ?Obesity Screening on??09/15/20.??Satisfied by Day HILL, Magali S.  ??? ? ? ?Tetanus Vaccine on??09/15/20.??Satisfied by Day HILL, Magali S.  ?  Lab Results  Test Name Test Result Date/Time Comments   Sodium Lvl 140 mmol/L 09/15/2020 10:20 CDT    Potassium Lvl 4.2 mmol/L 09/15/2020 10:20 CDT    Chloride 106 mmol/L 09/15/2020 10:20 CDT    CO2 26 mmol/L 09/15/2020 10:20 CDT    Calcium Lvl 8.8 mg/dL 09/15/2020 10:20 CDT    Glucose Lvl 114 mg/dL (High) 09/15/2020 10:20 CDT     mg/dL (High) 09/15/2020 10:20 CDT    BUN 9 mg/dL  09/15/2020 10:20 CDT    Creatinine 0.80 mg/dL 09/15/2020 10:20 CDT    eGFR-AA 98 mL/min 09/15/2020 10:20 CDT    Bili Total 0.3 mg/dL 09/15/2020 10:20 CDT    Bili Direct 0.1 mg/dL 09/15/2020 10:20 CDT    Bili Indirect 0.2 mg/dL 09/15/2020 10:20 CDT    AST 16 unit/L 09/15/2020 10:20 CDT    ALT 21 unit/L 09/15/2020 10:20 CDT    Alk Phos 89 unit/L 09/15/2020 10:20 CDT    Total Protein 7.8 gm/dL 09/15/2020 10:20 CDT    Albumin Lvl 3.5 gm/dL 09/15/2020 10:20 CDT    Globulin 4.30 gm/mL (High) 09/15/2020 10:20 CDT    A/G Ratio 0.8 ratio (Low) 09/15/2020 10:20 CDT    Hgb A1c 6.0 % 09/15/2020 10:20 CDT    Chol 202 mg/dL (High) 09/15/2020 10:20 CDT    HDL 50 mg/dL 09/15/2020 10:20 CDT    Trig 132 mg/dL 09/15/2020 10:20 CDT     mg/dL 09/15/2020 10:20 CDT    Chol/HDL 4.0 09/15/2020 10:20 CDT    VLDL 26 mg/dL 09/15/2020 10:20 CDT    TSH 1.628 mIU/L 09/15/2020 10:20 CDT    WBC 9.9 x10(3)/mcL 09/15/2020 10:20 CDT    RBC 4.87 x10(6)/mcL 09/15/2020 10:20 CDT    Hgb 13.2 gm/dL 09/15/2020 10:20 CDT    Hct 41.3 % 09/15/2020 10:20 CDT    Platelet 219 x10(3)/mcL 09/15/2020 10:20 CDT    MCV 84.8 fL 09/15/2020 10:20 CDT    MCH 27.1 pg 09/15/2020 10:20 CDT    MCHC 32.0 gm/dL 09/15/2020 10:20 CDT    RDW 14.5 % 09/15/2020 10:20 CDT    MPV 11.5 fL (High) 09/15/2020 10:20 CDT    Abs Neut 6.07 x10(3)/[facility] 09/15/2020 10:20 CDT    Neutro Auto 61 % 09/15/2020 10:20 CDT    Lymph Auto 31 % 09/15/2020 10:20 CDT    Mono Auto 5 % 09/15/2020 10:20 CDT    Eos Auto 2 % 09/15/2020 10:20 CDT    Abs Eos 0.2 x10(3)/[facility] 09/15/2020 10:20 CDT    Basophil Auto 0 % 09/15/2020 10:20 CDT    Abs Neutro 6.07 x10(3)/[facility] 09/15/2020 10:20 CDT    Abs Lymph 3.1 x10(3)/[facility] 09/15/2020 10:20 CDT    Abs Mono 0.5 x10(3)/[facility] 09/15/2020 10:20 CDT    Abs Baso 0.0 x10(3)/[facility] 09/15/2020 10:20 CDT    IG% 0 % 09/15/2020 10:20 CDT    IG# 0.0300 09/15/2020 10:20 CDT    UA Appear Clear 09/15/2020 10:20 CDT    UA Color LIGHT YELLOW 09/15/2020 10:20 CDT    UA Spec Grav 1.014 09/15/2020  10:20 CDT    UA Bili Negative 09/15/2020 10:20 CDT    UA pH 5.5 09/15/2020 10:20 CDT    UA Urobilinogen Normal 09/15/2020 10:20 CDT    UA Blood Negative 09/15/2020 10:20 CDT    UA Glucose Negative 09/15/2020 10:20 CDT    UA Ketones Negative 09/15/2020 10:20 CDT    UA Protein Negative 09/15/2020 10:20 CDT    UA Nitrite Negative 09/15/2020 10:20 CDT    UA Leuk Est Negative 09/15/2020 10:20 CDT    UA WBC Interp 0-2 09/15/2020 10:20 CDT    UA RBC Interp 3-5 (Abnormal) 09/15/2020 10:20 CDT    UA Bact Interp None Seen 09/15/2020 10:20 CDT    UA Squam Epi Interp  (Abnormal) 09/15/2020 10:20 CDT    UA Hyal Cast Interp 0-2 (Abnormal) 09/15/2020 10:20 CDT    Syphilis Ab Nonreactive 09/15/2020 10:20 CDT    HIV Nonreactive 09/15/2020 10:20 CDT    Hep A IgM Nonreactive 09/15/2020 10:20 CDT    Hep B Core IgM Nonreactive 09/15/2020 10:20 CDT    Hep Bs Ag Nonreactive 09/15/2020 10:20 CDT    Hep C Ab Nonreactive 09/15/2020 10:20 CDT Interpretive Information (HCV):  ? ? ? ? ? ? ? ?Nonreactive: ? ?Antibodies to HCV not detected.  ? ? ? ? ? ? ? ?Reactive: ? ? ? ?A reactive result should be followed by nucleic acid testing for  ? ? ? ? ? ? ? ? ?HCV RNA for identifying current hepatitis C virus(HCV) infection.  ? ? ? ? ? ? ? ?Equivocal: ? ? ?Another specimen should be obtained from patient for further testing.   Chlam trach PCR NOT DETECTED. 09/15/2020 10:20 CDT    N. gonorrhea PCR NOT DETECTED. 09/15/2020 10:20 CDT        Wellness exam:  Last Pap/pelvic in 2018; Last MMG at [facility] in 7/2019.

## 2022-05-02 NOTE — HISTORICAL [facility] CERNER
This is a historical note converted from [place]. Formatting and pictures may have been removed.  Please reference [employer] for original formatting and attached multimedia. Chief Complaint  F/U HTN, AUB, prediabetes, obesity and reports witnessed apnea, excessive daytime sleepiness  History of Present Illness  Pt is a?51 Years?old?AA Female for f/u visit. PM HTN, constipation, AUB, prediabetes and?obesity.?Followed by [facility]. Reports med compliance. Does not follow dash diet or exercise. Requesting to get checked for diabetes and hepatitis after conversation with aunt regarding her health. Reports entire family has diabetes and HTN. States daughter states she scares her when she sleeps cause she stops breathing and snores funny. Reports excesssive daytime sleepiness and never feels rested. Has completed Hair Club treatment with positive results. Denies chest pain, shortness of breath,?cough, fever, dizziness, weakness, abdominal pain, nausea,?vomiting, diarrhea, constipation, dysuria, depression, anxiety, SI/HI.  ?   ?Cervical Cancer Screening?-?Last Pap on 2/24/21.??Follow up?with GYN Clinic for annual?Pap/Pelvic.  Breast Cancer?Screening -?Last Mammogram?on 12/6/21.?Birads 1. Follow up annually.  Colon Cancer Screening -?FIT ordered. Follow up annually.  Eye Exam -?Last eye exam 1/11/21  Dental Exam?- Referral to Linda Conor to [facility] care  Vaccinations: ?Flu - 1/11/22 /?Covid - 3/16/21 & 4/6/21 / Pneumonia - /?Shingles - 1/11/21 & at next visit / Tetanus?- UTD (9/15/20)  Labwork -?UTD  Review of Systems  Constitutional: negative except as stated in HPI  Eye: negative except as stated in HPI  ENT: negative except as stated in HPI  Respiratory: negative except as stated in HPI  Cardiovascular: negative except as stated in HPI  Gastrointestinal: negative except as stated in HPI  Genitourinary: negative except as stated in HPI  Heme/Lymph: negative except as stated in HPI  Endocrine: negative except as stated  in HPI  Immunologic: negative except as stated in HPI  Musculoskeletal: negative except as stated in HPI  Integumentary: negative except as stated in HPI  Neurologic: negative except as stated in HPI  ?   All Other ROS_ negative except as stated in HPI  Physical Exam  Vitals & Measurements  T:?36.8? ?C (Oral)? HR:?76(Peripheral)? RR:?18? BP:?130/84?  HT:?170.00?cm? WT:?117.700?kg? BMI:?40.73? LMP:?12/23/2021 00:00 CST?  General: Alert and oriented, No acute distress.  Head: Normocephalic, Atraumatic.  Eye: Pupils are equal, round and reactive to light, Extraocular movements are intact, Normal conjunctiva, Sclera non-icteric.  Ears/Nose/Mouth/Throat: Normal hearing, Oral mucosa is moist, No pharyngeal erythema.  Neck/Thyroid: Supple, Non-tender, No lymphadenopathy, No thyromegaly, No carotid bruit, No JVD, Full range of motion.  Respiratory: Clear to auscultation bilaterally, Non-labored Respirations, Breath sounds are equal, Symmetrical chest wall expansion, No wheezes, rales or rhonchi.  Cardiovascular: Regular rate and rhythm, Normal S1/S2, No murmurs, rubs or gallops.?Normal peripheral perfusion, No edema.  Gastrointestinal: Soft, obese, Non-tender, Non-distended, Normal bowel sounds, No palpable organomegaly.  Genitourinary: No costovertebral angle tenderness, No inguinal tenderness.  Musculoskeletal: Normal range of motion, Normal strength, No tenderness, Normal gait.  Integumentary: Warm, Dry, Intact, No suspicious lesions or rashes.  Neurologic: No focal deficits, Cranial Nerves II-XII are grossly intact, Motor strength normal upper and lower extremities, Sensory exam intact.  Psychiatric: Normal interaction, Coherent speech, Euthymic mood, Appropriate affect.  Feet: 2+ pedal pulses bilaterally.  Assessment/Plan  1.?HTN (hypertension)?I10  At goal.  Refilled amlodipine.  Follow a low sodium (less than 2 grams of sodium per day), DASH diet.?  Continue medications as prescribed.  Monitor blood pressure and  report any consistent values greater than 140/90 and keep a log.?  Maintain healthy weight with a BMI goal of <30.?  Aerobic exercise for 150 minutes per week (or 5 days a week for 30 minutes each day).  Ordered:  1160F- Medication reconciliation completed during visit, HTN (hypertension)  Abnormal uterine bleeding  Constipation  Need for vaccination  Obesity, Class III, BMI 40-49.9 (morbid obesity)  Screen for colon cancer, [facility], 01/11/22 8:36:00 CST  Clinic Follow up, *Est. 04/12/22 8:40:00 CDT, Order for future visit, Diabetes, [facility]  Clinic Follow up, *Est. 07/11/22 3:00:00 CDT, Order for future visit, HTN (hypertension)  Abnormal uterine bleeding  Constipation  Obesity, Class III, BMI 40-49.9 (morbid obesity), [facility]  Office/Outpatient Visit Level 4 Established 24308 PC, HTN (hypertension)  Abnormal uterine bleeding  Constipation  Need for vaccination  Obesity, Class III, BMI 40-49.9 (morbid obesity)  Screen for colon cancer, [facility], 01/11/22 8:35:00 CST  ?  2.?Abnormal uterine bleeding?N93.9  Keep GYN appts/diagnostics as scheduled.  Continue meds as prescribed.  Ordered:  1160F- Medication reconciliation completed during visit, HTN (hypertension)  Abnormal uterine bleeding  Constipation  Need for vaccination  Obesity, Class III, BMI 40-49.9 (morbid obesity)  Screen for colon cancer, [facility], 01/11/22 8:36:00 CST  Clinic Follow up, *Est. 04/12/22 8:40:00 CDT, Order for future visit, Diabetes, [facility]  Clinic Follow up, *Est. 07/11/22 3:00:00 CDT, Order for future visit, HTN (hypertension)  Abnormal uterine bleeding  Constipation  Obesity, Class III, BMI 40-49.9 (morbid obesity), [facility]  Office/Outpatient Visit Level 4 Established 03851 PC, HTN (hypertension)  Abnormal uterine bleeding  Constipation  Need for vaccination  Obesity, Class III, BMI 40-49.9  (morbid obesity)  Screen for colon cancer, [facility], 01/11/22 8:35:00 CST  ?  3.?Constipation?K59.00  Refilled linzess prn.  Educated on high fiber diet and exercise.  Drink at least 64 ozs of water daily.  Eat hot breakfast q am.  Ordered:  1160F- Medication reconciliation completed during visit, HTN (hypertension)  Abnormal uterine bleeding  Constipation  Need for vaccination  Obesity, Class III, BMI 40-49.9 (morbid obesity)  Screen for colon cancer, [facility], 01/11/22 8:36:00 CST  Clinic Follow up, *Est. 04/12/22 8:40:00 CDT, Order for future visit, Diabetes, [facility]  Clinic Follow up, *Est. 07/11/22 3:00:00 CDT, Order for future visit, HTN (hypertension)  Abnormal uterine bleeding  Constipation  Obesity, Class III, BMI 40-49.9 (morbid obesity), [facility]  Office/Outpatient Visit Level 4 Established 14278 PC, HTN (hypertension)  Abnormal uterine bleeding  Constipation  Need for vaccination  Obesity, Class III, BMI 40-49.9 (morbid obesity)  Screen for colon cancer, [facility], 01/11/22 8:35:00 CST  ?  4.?Need for vaccination?Z23  ?Flu and shingles #1 given.  Ordered:  1160F- Medication reconciliation completed during visit, HTN (hypertension)  Abnormal uterine bleeding  Constipation  Need for vaccination  Obesity, Class III, BMI 40-49.9 (morbid obesity)  Screen for colon cancer, [facility], 01/11/22 8:36:00 CST  Clinic Follow up, *Est. 04/12/22 8:40:00 CDT, Order for future visit, Diabetes, [facility]  Office/Outpatient Visit Level 4 Established 53646 PC, HTN (hypertension)  Abnormal uterine bleeding  Constipation  Need for vaccination  Obesity, Class III, BMI 40-49.9 (morbid obesity)  Screen for colon cancer, [facility], 01/11/22 8:35:00 CST  ?  5.?Obesity, Class III, BMI 40-49.9 (morbid obesity)?E66.01  BMI 40. Goal BMI <30.  Aerobic exercise 150 minutes per  week.  Avoid soda, simple sugars, sweets, excessive rice, pasta, potatoes or bread.?  Choose brown options when available and portion control.  Limit fast foods and fried foods.?  Choose complex carbs in moderation (ex: green, leafy vegetables, beans, oatmeal).  Eat plenty of fresh fruits and vegetables with lean meats daily.?  Consider permanent healthy lifestyle changes.  Ordered:  1160F- Medication reconciliation completed during visit, HTN (hypertension)  Abnormal uterine bleeding  Constipation  Need for vaccination  Obesity, Class III, BMI 40-49.9 (morbid obesity)  Screen for colon cancer, [facility], 01/11/22 8:36:00 CST  Clinic Follow up, *Est. 04/12/22 8:40:00 CDT, Order for future visit, Diabetes, [facility]  Clinic Follow up, *Est. 07/11/22 3:00:00 CDT, Order for future visit, HTN (hypertension)  Abnormal uterine bleeding  Constipation  Obesity, Class III, BMI 40-49.9 (morbid obesity), [facility]  Office/Outpatient Visit Level 4 Established 85492 PC, HTN (hypertension)  Abnormal uterine bleeding  Constipation  Need for vaccination  Obesity, Class III, BMI 40-49.9 (morbid obesity)  Screen for colon cancer, [facility], 01/11/22 8:35:00 CST  ?  6.?Screen for colon cancer?Z12.11  ?FIT ordered.  Ordered:  1160F- Medication reconciliation completed during visit, HTN (hypertension)  Abnormal uterine bleeding  Constipation  Need for vaccination  Obesity, Class III, BMI 40-49.9 (morbid obesity)  Screen for colon cancer, [facility], 01/11/22 8:36:00 CST  Clinic Follow up, *Est. 04/12/22 8:40:00 CDT, Order for future visit, Diabetes, [facility]  Occult Blood Fecal Immunoassay, Routine collect, Stool, 01/11/22 11:02:00 CST, Stop date 01/11/22 11:02:00 CST, Nurse collect, Screen for colon cancer  Office/Outpatient Visit Level 4 Established 78604 PC, HTN (hypertension)  Abnormal uterine bleeding  Constipation   Need for vaccination  Obesity, Class III, BMI 40-49.9 (morbid obesity)  Screen for colon cancer, [facility], 01/11/22 8:35:00 CST  ?  7.?Diabetes?E11.9  ?A1C?6.9 at goal.? Previous A1C?6.4 (prediabetes).?  Referral to diabetes education.  FANS packet on diabetes provided to pt.  Rx glucometer, lancets and strips - encouraged to check at least daily and prn.  Rx metformin  mg daily.  Rx asa 81 mg daily.  Rx crestor 10 mg at bedtime daily.  Follow ADA diet.  Avoid soda, simple sweets, and limit rice/pasta/bread/starches and consume brown options when possible.?  Maintain healthy weight with BMI goal <30.?  Perform aerobic exercise for 150 minutes per week (or 5 days a week for 30 minutes each day).?  Examine feet daily.?  Obtain annual dilated eye exam.  Eye exam: 1/11/21  Foot exam: at next visit  Ordered:  Clinic Follow up, *Est. 04/12/22 8:40:00 CDT, Order for future visit, Diabetes, [facility]  Hemoglobin A1C [facility], Routine collect, *Est. 04/11/22 3:00:00 CDT, Blood, Order for future visit, *Est. Stop date 04/11/22 3:00:00 CDT, Lab Collect, Diabetes, 01/11/22 9:08:00 CST  ?  8.?Excessive daytime sleepiness?G47.19  ?Referral to sleep lab.  Keep appt when scheduled.  ?  9.?Routine screening for STI (sexually transmitted infection)?Z11.3  ?Amendable to STI screening - ordered.  Ordered:  Rissa Bishop, Trich vag [id number], Routine collect, Urine, 01/11/22 9:58:00 CST, Stop date 01/11/22 9:58:00 CST, Nurse collect, Routine screening for STI (sexually transmitted infection), 01/11/22 9:58:00 CST  ?  Orders:  amLODIPine, 5 mg = 1 tab(s), Oral, Daily, # 30 tab(s), 3 Refill(s), Pharmacy: [facility] #[id number], 170, cm, Height/Length Dosing, 01/11/22 8:27:00 CST, 117.7, kg, Weight Dosing, 01/11/22 8:27:00 CST  aspirin, 81 mg = 1 tab(s), Oral, Daily, # 30 tab(s), 0 Refill(s), other reason (Rx)  metFORMIN, 500 mg = 1 tab(s), Oral, Daily, Take with evening meal, # 30  tab(s), 3 Refill(s), Pharmacy: [facility] #[id number], 170, cm, Height/Length Dosing, 01/11/22 8:27:00 CST, 117.7, kg, Weight Dosing, 01/11/22 8:27:00 CST  Misc Prescription, Glucometer, See Instructions, Use to check CBG daily and prn. Dx: diabetes, # 1 EA, 0 Refill(s), Pharmacy: [facility] #[id number], 170, cm, Height/Length Dosing, 01/11/22 8:27:00 CST, 117.7, kg, Weight Dosing, 01/11/22 8:27:00 CST  Misc Prescription, Glucometer Strips, See Instructions, Use to check CBG daily and prn., # 50 EA, 11 Refill(s), Pharmacy: [facility] #[id number], 170, cm, Height/Length Dosing, 01/11/22 8:27:00 CST, 117.7, kg, Weight Dosing, 01/11/22 8:27:00 CST  Misc Prescription, Glucometer Lancets, See Instructions, Use to CBG daily and prn., # 50 EA, 11 Refill(s), Pharmacy: [facility] #[id number], 170, cm, Height/Length Dosing, 01/11/22 8:27:00 CST, 117.7, kg, Weight Dosing, 01/11/22 8:27:00 CST  rosuvastatin, 10 mg = 1 tab(s), Oral, Once a day (at bedtime), # 30 tab(s), 3 Refill(s), Pharmacy: [facility] #[id number], 170, cm, Height/Length Dosing, 01/11/22 8:27:00 CST, 117.7, kg, Weight Dosing, 01/11/22 8:27:00 CST  Request For In Office Diabetes Eye Screen - [facility], Screening for diabetic retinopathy  Will call with lab results.  RTC 3 months and prn. Labs one week prior to appt.  Referrals  Ambulatory Referral, Specialty: General Dentistry, Start: 01/11/22 10:05:00 CST, Wellness examination  [facility] Referral to Diabetes Education, Specialty: Diabetes Education, Refer To: Referral, Diabetes Education, [facility], [address] [place], [address]., Start: 01/11/22 9:21:00 CST  [facility] Referral to Sleep Lab Clinic, Specialty: Sleep Diagnostics, Refer To: Keenan ESCOBAR, Yuan BARRETT, [facility], --., Start: 01/11/22 9:10:00 CST  Clinic Follow up, *Est. 07/11/22 3:00:00 CDT, Order for future visit, HTN (hypertension)  Abnormal uterine bleeding  Constipation  Obesity, Class III, BMI  40-49.9 (morbid obesity), [facility]  Clinic Follow up, *Est. 04/12/22 8:40:00 CDT, Order for future visit, Diabetes, [facility]  Request For In Office Diabetes Eye Screen - [facility], Screening for diabetic retinopathy   Problem List/Past Medical History  Ongoing  Abnormal uterine bleeding  Constipation  Diabetes  HTN (hypertension)  Obesity, Class III, BMI 40-49.9 (morbid obesity)  Historical  Hypertension  Pregnant  Pregnant  Procedure/Surgical History  Carpal tunnel release (2018)  Tubal ligation (1998)   Medications  amlodipine 5 mg oral tablet, 5 mg= 1 tab(s), Oral, Daily, 3 refills  aspirin 81 mg oral Delayed Release (EC) tablet, 81 mg= 1 tab(s), Oral, Daily  Aygestin 5 mg oral tablet, 5 mg= 1 tab(s), Oral, Daily, 3 refills  Crestor 10 mg oral tablet, 10 mg= 1 tab(s), Oral, Once a day (at bedtime), 3 refills  Glucometer, See Instructions  Glucometer Lancets, See Instructions, 11 refills  Glucometer Strips, See Instructions, 11 refills  Linzess 290 mcg oral capsule, 290 mcg= 1 cap(s), Oral, Daily, PRN, 1 refills  metFORMIN 500 mg oral tablet, extended release, 500 mg= 1 tab(s), Oral, Daily, 3 refills  Allergies  No Known Allergies  Social History  Abuse/Neglect  No, No, Yes, 01/11/2022  Alcohol  Current, Wine, 1-2 times per year, Alcohol use interferes with work or home: No. Others hurt by drinking: No. Household alcohol concerns: No., 01/11/2022  Employment/School  Unemployed, 05/26/2021  Exercise  Home/Environment  [facility] with [facility]. Living situation: Home/Independent., 05/26/2021    Never in , 05/26/2021  Nutrition/Health  Regular, Good, 05/26/2021  Sexual  Sexually active: Yes. Number of current partners 1. Sexual orientation: Straight or heterosexual. Gender Identity Identifies as female. No, 05/26/2021  Spiritual/Cultural  Sabianism, 05/26/2021  Substance Use  Never, 05/26/2021  Tobacco  Never (less than 100 in lifetime), N/A, 01/11/2022  Family  History  Congestive heart disease.: Mother.  Diabetes: Mother, Sister and Brother.  Diabetes mellitus type 2: Mother and Father.  Hypertension.: Mother and Father.  Immunizations  Vaccine Date Status   zoster vaccine, inactivated 01/11/2022 Given   influenza virus vaccine, inactivated 01/11/2022 Given   COVID-19 MRNA, LNP-S, PF- Pfizer 04/06/2021 Given   COVID-19 MRNA, LNP-S, PF- Pfizer 03/16/2021 Given   influenza virus vaccine, inactivated 12/01/2020 Given   tetanus/diphtheria/pertussis, acel(Tdap) 09/15/2020 Given   influenza virus vaccine, inactivated 10/02/2019 Recorded   hepatitis B adult vaccine 12/05/2013 Recorded   hepatitis B adult vaccine 05/16/2013 Recorded   hepatitis B adult vaccine 04/16/2013 Recorded   tetanus/diphtheria/pertussis, acel(Tdap) 09/29/2010 Recorded   measles/mumps/rubella virus vaccine 10/23/2001 Recorded   poliovirus vaccine, live, trivalent 11/26/1979 Recorded   poliovirus vaccine, live, trivalent 09/07/1977 Recorded   poliovirus vaccine, live, trivalent 05/17/1976 Recorded   poliovirus vaccine, live, trivalent 03/12/1976 Recorded   poliovirus vaccine, live, trivalent 12/15/1975 Recorded   mumps virus vaccine 11/26/1974 Recorded   Health Maintenance  Health Maintenance  ???Pending?(in the next year)  ??? ??Due?  ??? ? ? ?Colorectal Screening due??01/11/22??Unknown Frequency  ??? ? ? ?Diabetes Maintenance-Eye Exam due??01/11/22??Unknown Frequency  ??? ? ? ?Diabetes Maintenance-Foot Exam due??01/11/22??Unknown Frequency  ??? ? ? ?Diabetes Maintenance-Microalbumin due??01/11/22??Unknown Frequency  ??? ??Due In Future?  ??? ? ? ?Influenza Vaccine not due until??10/01/22??and every 1??day(s)  ??? ? ? ?ADL Screening not due until??10/11/22??and every 1??year(s)  ??? ? ? ?Obesity Screening not due until??01/01/23??and every 1??year(s)  ??? ? ? ?Alcohol Misuse Screening not due until??01/02/23??and every 1??year(s)  ???Satisfied?(in the past 1 year)  ??? ??Satisfied?  ??? ? ? ?ADL Screening  on??10/11/21.??Satisfied by Summer Garrison LPN  ??? ? ? ?Alcohol Misuse Screening on??01/11/22.??Satisfied by Brianna Srinivasan NP  ??? ? ? ?Blood Pressure Screening on??01/11/22.??Satisfied by Lisa Wiseman LPN  ??? ? ? ?Body Mass Index Check on??01/11/22.??Satisfied by Lisa Wiseman LPN  ??? ? ? ?Breast Cancer Screening on??12/06/21.??Satisfied by Rosalina Hernandez  ??? ? ? ?Cervical Cancer Screening on??02/24/21.??Satisfied by Clementine Humphrey  ??? ? ? ?Depression Screening on??01/11/22.??Satisfied by Lisa Wiseman LPN  ??? ? ? ?Diabetes Maintenance-Fasting Lipid Profile on??01/11/22.??Satisfied by Samantha Nichole MT  ??? ? ? ?Diabetes Screening on??01/11/22.??Satisfied by Samantha Nichole MT  ??? ? ? ?Hypertension Management-BMP on??01/11/22.??Satisfied by Samantha Nichole MT  ??? ? ? ?Influenza Vaccine on??01/11/22.??Satisfied by Lisa Wiseman LPN  ??? ? ? ?Lipid Screening on??01/11/22.??Satisfied by Samantha Nichole MT  ??? ? ? ?Obesity Screening on??01/11/22.??Satisfied by Lisa Wiseman LPN  ?

## 2022-06-01 LAB
LEFT EYE DM RETINOPATHY: POSITIVE
RIGHT EYE DM RETINOPATHY: NEGATIVE

## 2022-06-14 ENCOUNTER — PATIENT OUTREACH (OUTPATIENT)
Dept: ADMINISTRATIVE | Facility: HOSPITAL | Age: 52
End: 2022-06-14
Payer: MEDICAID

## 2022-06-14 NOTE — PROGRESS NOTES
Population Health Outreach.Records Received, hyper-linked into chart at this time. The following record(s)  below were uploaded for Health Maintenance .             6/1/2022 DM EYE EXAM

## 2022-08-09 ENCOUNTER — LAB VISIT (OUTPATIENT)
Dept: LAB | Facility: HOSPITAL | Age: 52
End: 2022-08-09
Attending: NURSE PRACTITIONER
Payer: MEDICAID

## 2022-08-09 DIAGNOSIS — E11.9 DIABETES: Primary | ICD-10-CM

## 2022-08-09 LAB
ALBUMIN SERPL-MCNC: 3.8 GM/DL (ref 3.5–5)
ALBUMIN/GLOB SERPL: 1 RATIO (ref 1.1–2)
ALP SERPL-CCNC: 93 UNIT/L (ref 40–150)
ALT SERPL-CCNC: 16 UNIT/L (ref 0–55)
AST SERPL-CCNC: 16 UNIT/L (ref 5–34)
BASOPHILS # BLD AUTO: 0.02 X10(3)/MCL (ref 0–0.2)
BASOPHILS NFR BLD AUTO: 0.2 %
BILIRUBIN DIRECT+TOT PNL SERPL-MCNC: 0.3 MG/DL
BUN SERPL-MCNC: 19.2 MG/DL (ref 9.8–20.1)
CALCIUM SERPL-MCNC: 9.6 MG/DL (ref 8.4–10.2)
CHLORIDE SERPL-SCNC: 108 MMOL/L (ref 98–107)
CO2 SERPL-SCNC: 24 MMOL/L (ref 22–29)
CREAT SERPL-MCNC: 0.96 MG/DL (ref 0.55–1.02)
EOSINOPHIL # BLD AUTO: 0.17 X10(3)/MCL (ref 0–0.9)
EOSINOPHIL NFR BLD AUTO: 1.9 %
ERYTHROCYTE [DISTWIDTH] IN BLOOD BY AUTOMATED COUNT: 16.5 % (ref 11.5–17)
EST. AVERAGE GLUCOSE BLD GHB EST-MCNC: 139.9 MG/DL
GFR SERPLBLD CREATININE-BSD FMLA CKD-EPI: >60 MLS/MIN/1.73/M2
GLOBULIN SER-MCNC: 4 GM/DL (ref 2.4–3.5)
GLUCOSE SERPL-MCNC: 153 MG/DL (ref 74–100)
HBA1C MFR BLD: 6.5 %
HCT VFR BLD AUTO: 42.1 % (ref 37–47)
HGB BLD-MCNC: 13.6 GM/DL (ref 12–16)
IMM GRANULOCYTES # BLD AUTO: 0.02 X10(3)/MCL (ref 0–0.04)
IMM GRANULOCYTES NFR BLD AUTO: 0.2 %
LYMPHOCYTES # BLD AUTO: 3.21 X10(3)/MCL (ref 0.6–4.6)
LYMPHOCYTES NFR BLD AUTO: 36.2 %
MCH RBC QN AUTO: 26.4 PG (ref 27–31)
MCHC RBC AUTO-ENTMCNC: 32.3 MG/DL (ref 33–36)
MCV RBC AUTO: 81.6 FL (ref 80–94)
MONOCYTES # BLD AUTO: 0.52 X10(3)/MCL (ref 0.1–1.3)
MONOCYTES NFR BLD AUTO: 5.9 %
NEUTROPHILS # BLD AUTO: 4.9 X10(3)/MCL (ref 2.1–9.2)
NEUTROPHILS NFR BLD AUTO: 55.6 %
NRBC BLD AUTO-RTO: 0 %
PLATELET # BLD AUTO: 234 X10(3)/MCL (ref 130–400)
PMV BLD AUTO: 11.3 FL (ref 7.4–10.4)
POTASSIUM SERPL-SCNC: 4.5 MMOL/L (ref 3.5–5.1)
PROT SERPL-MCNC: 7.8 GM/DL (ref 6.4–8.3)
RBC # BLD AUTO: 5.16 X10(6)/MCL (ref 4.2–5.4)
SODIUM SERPL-SCNC: 141 MMOL/L (ref 136–145)
WBC # SPEC AUTO: 8.9 X10(3)/MCL (ref 4.5–11.5)

## 2022-08-09 PROCEDURE — 36415 COLL VENOUS BLD VENIPUNCTURE: CPT

## 2022-08-09 PROCEDURE — 85025 COMPLETE CBC W/AUTO DIFF WBC: CPT

## 2022-08-09 PROCEDURE — 83036 HEMOGLOBIN GLYCOSYLATED A1C: CPT

## 2022-08-09 PROCEDURE — 80053 COMPREHEN METABOLIC PANEL: CPT

## 2022-09-16 ENCOUNTER — HISTORICAL (OUTPATIENT)
Dept: ADMINISTRATIVE | Facility: HOSPITAL | Age: 52
End: 2022-09-16
Payer: MEDICAID

## 2022-09-21 ENCOUNTER — LAB VISIT (OUTPATIENT)
Dept: LAB | Facility: HOSPITAL | Age: 52
End: 2022-09-21
Attending: NURSE PRACTITIONER
Payer: MEDICAID

## 2022-09-21 ENCOUNTER — OFFICE VISIT (OUTPATIENT)
Dept: INTERNAL MEDICINE | Facility: CLINIC | Age: 52
End: 2022-09-21
Payer: MEDICAID

## 2022-09-21 VITALS
HEART RATE: 71 BPM | DIASTOLIC BLOOD PRESSURE: 87 MMHG | BODY MASS INDEX: 36.91 KG/M2 | SYSTOLIC BLOOD PRESSURE: 139 MMHG | HEIGHT: 67 IN | RESPIRATION RATE: 20 BRPM | TEMPERATURE: 98 F | WEIGHT: 235.19 LBS

## 2022-09-21 DIAGNOSIS — Z11.3 ROUTINE SCREENING FOR STI (SEXUALLY TRANSMITTED INFECTION): ICD-10-CM

## 2022-09-21 DIAGNOSIS — L74.0 HEAT RASH: ICD-10-CM

## 2022-09-21 DIAGNOSIS — K59.04 CHRONIC IDIOPATHIC CONSTIPATION: ICD-10-CM

## 2022-09-21 DIAGNOSIS — I10 PRIMARY HYPERTENSION: Primary | ICD-10-CM

## 2022-09-21 DIAGNOSIS — Z12.31 ENCOUNTER FOR SCREENING MAMMOGRAM FOR MALIGNANT NEOPLASM OF BREAST: ICD-10-CM

## 2022-09-21 DIAGNOSIS — E11.3292 TYPE 2 DIABETES MELLITUS WITH LEFT EYE AFFECTED BY MILD NONPROLIFERATIVE RETINOPATHY WITHOUT MACULAR EDEMA, WITHOUT LONG-TERM CURRENT USE OF INSULIN: ICD-10-CM

## 2022-09-21 DIAGNOSIS — E11.3292 MILD NONPROLIFERATIVE DIABETIC RETINOPATHY OF LEFT EYE WITHOUT MACULAR EDEMA ASSOCIATED WITH TYPE 2 DIABETES MELLITUS: ICD-10-CM

## 2022-09-21 DIAGNOSIS — G47.33 OBSTRUCTIVE SLEEP APNEA SYNDROME: ICD-10-CM

## 2022-09-21 DIAGNOSIS — Z23 NEED FOR VACCINATION: ICD-10-CM

## 2022-09-21 DIAGNOSIS — N93.9 ABNORMAL UTERINE BLEEDING: ICD-10-CM

## 2022-09-21 DIAGNOSIS — Z91.199 NONCOMPLIANCE: ICD-10-CM

## 2022-09-21 PROBLEM — E11.9 DIABETES MELLITUS: Status: ACTIVE | Noted: 2022-09-21

## 2022-09-21 PROBLEM — K59.00 CONSTIPATION: Status: ACTIVE | Noted: 2022-09-21

## 2022-09-21 LAB
C TRACH DNA SPEC QL NAA+PROBE: NOT DETECTED
HAV IGM SERPL QL IA: NONREACTIVE
HBV CORE IGM SERPL QL IA: NONREACTIVE
HBV SURFACE AG SERPL QL IA: NONREACTIVE
HCV AB SERPL QL IA: NONREACTIVE
HIV 1+2 AB+HIV1 P24 AG SERPL QL IA: NONREACTIVE
N GONORRHOEA DNA SPEC QL NAA+PROBE: NOT DETECTED
T PALLIDUM AB SER QL: NONREACTIVE

## 2022-09-21 PROCEDURE — 3079F PR MOST RECENT DIASTOLIC BLOOD PRESSURE 80-89 MM HG: ICD-10-PCS | Mod: CPTII,,, | Performed by: NURSE PRACTITIONER

## 2022-09-21 PROCEDURE — 3075F SYST BP GE 130 - 139MM HG: CPT | Mod: CPTII,,, | Performed by: NURSE PRACTITIONER

## 2022-09-21 PROCEDURE — 1159F PR MEDICATION LIST DOCUMENTED IN MEDICAL RECORD: ICD-10-PCS | Mod: CPTII,,, | Performed by: NURSE PRACTITIONER

## 2022-09-21 PROCEDURE — 87491 CHLMYD TRACH DNA AMP PROBE: CPT

## 2022-09-21 PROCEDURE — 99214 OFFICE O/P EST MOD 30 MIN: CPT | Mod: PBBFAC | Performed by: NURSE PRACTITIONER

## 2022-09-21 PROCEDURE — 87389 HIV-1 AG W/HIV-1&-2 AB AG IA: CPT

## 2022-09-21 PROCEDURE — 36415 COLL VENOUS BLD VENIPUNCTURE: CPT

## 2022-09-21 PROCEDURE — 86780 TREPONEMA PALLIDUM: CPT

## 2022-09-21 PROCEDURE — 90686 IIV4 VACC NO PRSV 0.5 ML IM: CPT | Mod: PBBFAC

## 2022-09-21 PROCEDURE — 3008F BODY MASS INDEX DOCD: CPT | Mod: CPTII,,, | Performed by: NURSE PRACTITIONER

## 2022-09-21 PROCEDURE — 3079F DIAST BP 80-89 MM HG: CPT | Mod: CPTII,,, | Performed by: NURSE PRACTITIONER

## 2022-09-21 PROCEDURE — 3075F PR MOST RECENT SYSTOLIC BLOOD PRESS GE 130-139MM HG: ICD-10-PCS | Mod: CPTII,,, | Performed by: NURSE PRACTITIONER

## 2022-09-21 PROCEDURE — 3008F PR BODY MASS INDEX (BMI) DOCUMENTED: ICD-10-PCS | Mod: CPTII,,, | Performed by: NURSE PRACTITIONER

## 2022-09-21 PROCEDURE — 1159F MED LIST DOCD IN RCRD: CPT | Mod: CPTII,,, | Performed by: NURSE PRACTITIONER

## 2022-09-21 PROCEDURE — 99214 PR OFFICE/OUTPT VISIT, EST, LEVL IV, 30-39 MIN: ICD-10-PCS | Mod: S$PBB,,, | Performed by: NURSE PRACTITIONER

## 2022-09-21 PROCEDURE — 80074 ACUTE HEPATITIS PANEL: CPT

## 2022-09-21 PROCEDURE — 99214 OFFICE O/P EST MOD 30 MIN: CPT | Mod: S$PBB,,, | Performed by: NURSE PRACTITIONER

## 2022-09-21 PROCEDURE — 87591 N.GONORRHOEAE DNA AMP PROB: CPT

## 2022-09-21 RX ORDER — INSULIN PUMP SYRINGE, 3 ML
EACH MISCELLANEOUS
COMMUNITY
Start: 2022-01-11 | End: 2024-01-31 | Stop reason: SDUPTHER

## 2022-09-21 RX ORDER — LINACLOTIDE 290 UG/1
290 CAPSULE, GELATIN COATED ORAL DAILY
COMMUNITY
Start: 2022-05-18 | End: 2022-09-21 | Stop reason: SDUPTHER

## 2022-09-21 RX ORDER — METFORMIN HYDROCHLORIDE 500 MG/1
500 TABLET, EXTENDED RELEASE ORAL DAILY
COMMUNITY
Start: 2022-04-20 | End: 2022-09-21 | Stop reason: SDUPTHER

## 2022-09-21 RX ORDER — ASPIRIN 81 MG/1
81 TABLET ORAL
COMMUNITY
Start: 2022-01-11

## 2022-09-21 RX ORDER — PRAVASTATIN SODIUM 20 MG/1
10 TABLET ORAL NIGHTLY
Qty: 45 TABLET | Refills: 1 | Status: SHIPPED | OUTPATIENT
Start: 2022-09-21 | End: 2023-01-23 | Stop reason: SDUPTHER

## 2022-09-21 RX ORDER — AMLODIPINE BESYLATE 5 MG/1
5 TABLET ORAL DAILY
COMMUNITY
Start: 2022-04-12 | End: 2022-09-21 | Stop reason: SDUPTHER

## 2022-09-21 RX ORDER — LINACLOTIDE 290 UG/1
290 CAPSULE, GELATIN COATED ORAL
Qty: 30 CAPSULE | Refills: 1 | Status: SHIPPED | OUTPATIENT
Start: 2022-09-21 | End: 2023-01-23 | Stop reason: ALTCHOICE

## 2022-09-21 RX ORDER — ROSUVASTATIN CALCIUM 10 MG/1
10 TABLET, COATED ORAL NIGHTLY
COMMUNITY
Start: 2022-05-09 | End: 2022-09-21 | Stop reason: SDUPTHER

## 2022-09-21 RX ORDER — METFORMIN HYDROCHLORIDE 500 MG/1
500 TABLET, EXTENDED RELEASE ORAL NIGHTLY
Qty: 90 TABLET | Refills: 1 | Status: SHIPPED | OUTPATIENT
Start: 2022-09-21 | End: 2023-01-23 | Stop reason: SDDI

## 2022-09-21 RX ORDER — HYDROXYZINE PAMOATE 25 MG/1
25 CAPSULE ORAL
COMMUNITY
Start: 2022-01-20 | End: 2023-01-20

## 2022-09-21 RX ORDER — AMLODIPINE BESYLATE 5 MG/1
5 TABLET ORAL NIGHTLY
Qty: 90 TABLET | Refills: 1 | Status: SHIPPED | OUTPATIENT
Start: 2022-09-21 | End: 2023-01-23 | Stop reason: SDUPTHER

## 2022-09-21 NOTE — ASSESSMENT & PLAN NOTE
At length discussion with pt regarding continued medical/medicine noncompliance, i.e. stroke, MI, DKA, loss of sight, limb, renal failure and possible death.  Understanding voiced.  Reports will resume taking meds as prescribed.

## 2022-09-21 NOTE — PROGRESS NOTES
MARIYA Gonzalez   [facility]  [address] [facility] [address]      PATIENT NAME: Joyce Marie Boast  : 1970  DATE: 22  MRN: [id number]      Billing Provider: MARIYA Gonzalez  Level of Service: [place] OFFICE/OUTPT VISIT, EST, LEVL IV, 30-39 MIN  Patient PCP Information       Provider PCP Type    MARIYA Gonzalez General            Reason for Visit / Chief Complaint: Follow-up (Lab results, c/o rash after sun exposure x months feels like ants )       History of Present Illness / Problem Focused Workflow     Joyce Marie Boast (Bharti) is a 52 y.o. Black or  female presents to the clinic for DM f/u and c/o skin rash. PM HTN, constipation, AUB, DM, DANA on CPAP, liposuction of abd (3/28/22) and back (22) and obesity. Followed by [facility] and [facility]. Completed initial DM education class. Continues not to follow ADA diet or exercise routinely. Does not check CBGs. Has not been taking any meds since April except linzess prn. Has received CPAP and is wearing nightly. Will contact sleep lab for new supplies. Reports/showed photos on phone of rash which results when in the skin and feels like her skin is burning. States has to go back in. Does not apply any form of sunscreen. Labs reviewed with pt. Amendable to flu vaccine today. Requesting STI testing as she has an aunt that has something going on and someone mentioned hepatitis. Denies chest pain, shortness of breath, cough, fever, headache, dizziness, weakness, abdominal pain, nausea, vomiting, diarrhea, constipation, dysuria, depression, anxiety, SI/HI.      Review of Systems     Review of Systems   Constitutional: Negative.    HENT: Negative.     Eyes: Negative.    Respiratory: Negative.     Cardiovascular: Negative.    Gastrointestinal: Negative.    Endocrine: Negative.    Genitourinary: Negative.    Musculoskeletal: Negative.     Integumentary:  Negative.   Neurological: Negative.    Psychiatric/Behavioral: Negative.        Medical / Social / Family History     Past Medical History:   Diagnosis Date    Diabetes mellitus, type 2     Hypertension        Past Surgical History:   Procedure Laterality Date    CARPAL TUNNEL RELEASE Bilateral     TUBAL LIGATION         Social History  Ms. Boast (Gotch) reports that she has never smoked. She has never used smokeless tobacco. She reports current alcohol use. She reports that she does not use drugs.    Family History  Ms.Boast (Gotch)'s family history is not on file.    Medications and Allergies     Medications  Medication List with Changes/Refills   Current Medications    ASPIRIN (ECOTRIN) 81 MG EC TABLET    Take 81 mg by mouth.    BLOOD SUGAR DIAGNOSTIC STRP      Glucometer Strips, See Instructions, Use to check CBG daily and prn., # 50 EA, 11 Refill(s), Pharmacy: [facility] #[id number], 170, cm, Height/Length Dosing, 01/11/22 8:27:00 CST, 117.7, kg, Weight Dosing, 01/11/22 8:27:00 CST    BLOOD-GLUCOSE METER KIT      Glucometer, See Instructions, Use to check CBG daily and prn. Dx: diabetes, # 1 EA, 0 Refill(s), Pharmacy: [facility] #[id number], 170, cm, Height/Length Dosing, 01/11/22 8:27:00 CST, 117.7, kg, Weight Dosing, 01/11/22 8:27:00 CST    HYDROXYZINE PAMOATE (VISTARIL) 25 MG CAP    Take 25 mg by mouth.    LANCETS OP ([facility]) OP      Glucometer Lancets, See Instructions, Use to CBG daily and prn., # 50 EA, 11 Refill(s), Pharmacy: [facility] #[id number], 170, cm, Height/Length Dosing, 01/11/22 8:27:00 CST, 117.7, kg, Weight Dosing, 01/11/22 8:27:00 CST   Changed and/or Refilled Medications    Modified Medication Previous Medication    AMLODIPINE (NORVASC) 5 MG TABLET amLODIPine (NORVASC) 5 MG tablet       Take 1 tablet (5 mg total) by mouth every evening.    Take 5 mg by mouth once daily.    LINZESS 290 MCG CAP CAPSULE LINZESS 290 mcg Cap capsule       Take 1 capsule (290 mcg  "total) by mouth before breakfast.    Take 290 mcg by mouth once daily.    METFORMIN (GLUCOPHAGE-XR) 500 MG ER 24HR TABLET metFORMIN (GLUCOPHAGE-XR) 500 MG ER 24hr tablet       Take 1 tablet (500 mg total) by mouth every evening.    Take 500 mg by mouth once daily.    PRAVASTATIN (PRAVACHOL) 20 MG TABLET rosuvastatin (CRESTOR) 10 MG tablet       Take 0.5 tablets (10 mg total) by mouth every evening.    Take 10 mg by mouth every evening.         Allergies  Review of patient's allergies indicates:  No Known Allergies    Physical Examination   Vital Signs  Temp: 98.1 °F (36.7 °C)  Temp src: Oral  Pulse: 71  Resp: 20  BP: 139/87  BP Location: Left arm  Patient Position: Sitting  Pain Score: 0-No pain  Height and Weight  Height: 5' 6.93" (170 cm)  Weight: 106.7 kg (235 lb 3.2 oz)  BSA (Calculated - sq m): 2.24 sq meters  BMI (Calculated): 36.9  Weight in (lb) to have BMI = 25: 158.9]    Physical Exam  Vitals reviewed.   Constitutional:       Appearance: Normal appearance.   HENT:      Head: Normocephalic.   Eyes:      Pupils: Pupils are equal, round, and reactive to light.   Cardiovascular:      Rate and Rhythm: Normal rate and regular rhythm.      Heart sounds: Normal heart sounds.   Pulmonary:      Effort: Pulmonary effort is normal.      Breath sounds: Normal breath sounds.   Abdominal:      General: Bowel sounds are normal.      Palpations: Abdomen is soft.   Skin:     General: Skin is warm.   Neurological:      Mental Status: She is alert and oriented to person, place, and time.   Psychiatric:         Mood and Affect: Mood normal.         Speech: Speech normal.         Behavior: Behavior is cooperative.         Judgment: Judgment normal.         Results     Lab Results   Component Value Date    WBC 8.9 08/09/2022    RBC 5.16 08/09/2022    HGB 13.6 08/09/2022    HCT 42.1 08/09/2022    MCV 81.6 08/09/2022    MCH 26.4 (L) 08/09/2022    MCHC 32.3 (L) 08/09/2022    RDW 16.5 08/09/2022     08/09/2022    MPV 11.3 (H) " 08/09/2022     Sodium Level   Date Value Ref Range Status   08/09/2022 141 136 - 145 mmol/L Final     Potassium Level   Date Value Ref Range Status   08/09/2022 4.5 3.5 - 5.1 mmol/L Final     Carbon Dioxide   Date Value Ref Range Status   08/09/2022 24 22 - 29 mmol/L Final     Blood Urea Nitrogen   Date Value Ref Range Status   08/09/2022 19.2 9.8 - 20.1 mg/dL Final     Creatinine   Date Value Ref Range Status   08/09/2022 0.96 0.55 - 1.02 mg/dL Final     Calcium Level Total   Date Value Ref Range Status   08/09/2022 9.6 8.4 - 10.2 mg/dL Final     Albumin Level   Date Value Ref Range Status   08/09/2022 3.8 3.5 - 5.0 gm/dL Final     Bilirubin Total   Date Value Ref Range Status   08/09/2022 0.3 <=1.5 mg/dL Final     Alkaline Phosphatase   Date Value Ref Range Status   08/09/2022 93 40 - 150 unit/L Final     Aspartate Aminotransferase   Date Value Ref Range Status   08/09/2022 16 5 - 34 unit/L Final     Alanine Aminotransferase   Date Value Ref Range Status   08/09/2022 16 0 - 55 unit/L Final     Estimated GFR-Non    Date Value Ref Range Status   01/11/2022 80 (L) >>=90 mL/min/1.73 m2 Final     Lab Results   Component Value Date    CHOL 168 04/04/2022     Lab Results   Component Value Date    HDL 48 04/04/2022     No results found for: LDLCALC  Lab Results   Component Value Date    TRIG 91 04/04/2022     No results found for: CHOLHDL  Lab Results   Component Value Date    TSH 1.4422 10/01/2021     Lab Results   Component Value Date    PHUR 5.5 10/01/2021    PROTEINUA Negative 10/01/2021    GLUCUA Negative 10/01/2021    KETONESU Negative 10/01/2021    OCCULTUA Negative 10/01/2021    NITRITE Negative 10/01/2021    LEUKOCYTESUR Negative 10/01/2021     Lab Results   Component Value Date    HGBA1C 6.5 08/09/2022    HGBA1C 6.4 04/04/2022    HGBA1C 6.6 01/11/2022     No results found for: MICROALBUR, [id number]   No results found for this or any previous visit.         Assessment and Plan (including Health  Maintenance)     Plan: RTC 4 months and prn. Labs one week prior to appt.         Health Maintenance Due   Topic Date Due    Cervical Cancer Screening  Never done    Colorectal Cancer Screening  Never done       Problem List Items Addressed This Visit          Ophtho    Mild nonproliferative diabetic retinopathy of left eye without macular edema associated with type 2 diabetes mellitus    Current Assessment & Plan     Keep appts with [facility].  Report visual changes.  Control blood glucose levels.         Relevant Medications    metFORMIN (GLUCOPHAGE-XR) 500 MG ER 24hr tablet       Derm    Heat rash    Current Assessment & Plan     Instructed to wear sunscreen and cool long sleeves/cover skin with sun exposure.  Apply calamine lotion, cortisone, aloe vera or benadryl cream with occurrence.  Take cool, oatmeal baths to soothe.  Ensure adequate water intake when outside.  Apply ice and cool down in AC.  Do not scratch, pick or rub.  ED precautions if swelling or fever result.               Cardiac/Vascular    Hypertension - Primary    Current Assessment & Plan     Borderline.  Refilled amlodipine.  Follow a low sodium (less than 2 grams of sodium per day), DASH diet.   Continue medications as prescribed.  Monitor blood pressure and report any consistent values greater than 140/90 and keep a log.   Maintain healthy weight with a BMI goal of <30.   Aerobic exercise for 150 minutes per week (or 5 days a week for 30 minutes each day).           Relevant Orders    T4, Free    TSH       Renal/    Abnormal uterine bleeding    Current Assessment & Plan     Pt was supposed to f/u with GYN in Oct 2021.  Pt did not follow up.  Telephone number provided to call and schedule.            Endocrine    Diabetes mellitus    Current Assessment & Plan     A1C 6. at goal. Previous A1C 6.4.   Encouraged to check at least daily and prn.  Continue metformin  mg daily.  Continue asa 81 mg daily.  Continue crestor 10 mg at  bedtime daily.  Follow ADA diet.  Avoid soda, simple sweets, and limit rice/pasta/bread/starches and consume brown options when possible.   Maintain healthy weight with BMI goal <30.   Perform aerobic exercise for 150 minutes per week (or 5 days a week for 30 minutes each day).   Examine feet daily.   Obtain annual dilated eye exam.  Eye exam: 6/1/22  Foot exam: 4/12/22           Relevant Medications    metFORMIN (GLUCOPHAGE-XR) 500 MG ER 24hr tablet    Other Relevant Orders    CBC Auto Differential    Comprehensive Metabolic Panel    Hemoglobin A1C    Lipid Panel    Microalbumin/Creatinine Ratio, Urine    Urinalysis, Reflex to Urine Culture Urine, Clean Catch    BMI 36.0-36.9,adult       GI    Constipation    Current Assessment & Plan     Continue linzess prn.  Educated on high fiber diet and exercise.  Drink at least 64 ozs of water daily.  Eat hot breakfast q am.              Palliative Care    Noncompliance    Current Assessment & Plan     At length discussion with pt regarding continued medical/medicine noncompliance, i.e. stroke, MI, DKA, loss of sight, limb, renal failure and possible death.  Understanding voiced.  Reports will resume taking meds as prescribed.              Other    Obstructive sleep apnea syndrome    Current Assessment & Plan     Reports compliance with wearing CPAP nightly.  Reports decreased EDS, snoring and fatigue.  Pt is benefiting from its use.  Expect lifetime use and decreased daytime sleepiness/fatigue.   Avoid excessive alcohol, smoking and overuse of sedatives at bedtime.  Weight management discussed.  Adjust sleep position as needed for increased comfort.            Other Visit Diagnoses       Need for vaccination        Relevant Orders    Influenza - Quadrivalent (PF) (Completed)    Routine screening for STI (sexually transmitted infection)        Relevant Orders    Chlamydia/GC, PCR    Hepatitis Panel, Acute    HIV 1/2 Ag/Ab (4th Gen)    SYPHILIS ANTIBODY (WITH REFLEX RPR)             Health Maintenance Topics with due status: Not Due       Topic Last Completion Date    TETANUS VACCINE 09/15/2020    Mammogram 12/06/2021    Lipid Panel 04/04/2022       Future Appointments   Date Time Provider Department Center   1/23/2023  9:45 AM MARIYA Peña [facility]             Signature:  MARIYA Gonzalez  [facility]  [address] [facility] [address]    Date of encounter: 9/21/22

## 2022-09-21 NOTE — ASSESSMENT & PLAN NOTE
Pt was supposed to f/u with GYN in Oct 2021.  Pt did not follow up.  Telephone number provided to call and schedule.

## 2022-09-21 NOTE — ASSESSMENT & PLAN NOTE
Continue linzess prn.  Educated on high fiber diet and exercise.  Drink at least 64 ozs of water daily.  Eat hot breakfast q am.

## 2022-09-21 NOTE — ASSESSMENT & PLAN NOTE
Instructed to wear sunscreen and cool long sleeves/cover skin with sun exposure.  Apply calamine lotion, cortisone, aloe vera or benadryl cream with occurrence.  Take cool, oatmeal baths to soothe.  Ensure adequate water intake when outside.  Apply ice and cool down in AC.  Do not scratch, pick or rub.  ED precautions if swelling or fever result.

## 2022-09-21 NOTE — ASSESSMENT & PLAN NOTE
A1C 6. at goal. Previous A1C 6.4.   Encouraged to check at least daily and prn.  Continue metformin  mg daily.  Continue asa 81 mg daily.  Continue crestor 10 mg at bedtime daily.  Follow ADA diet.  Avoid soda, simple sweets, and limit rice/pasta/bread/starches and consume brown options when possible.   Maintain healthy weight with BMI goal <30.   Perform aerobic exercise for 150 minutes per week (or 5 days a week for 30 minutes each day).   Examine feet daily.   Obtain annual dilated eye exam.  Eye exam: 6/1/22  Foot exam: 4/12/22

## 2022-09-22 LAB — PATH REV: NORMAL

## 2022-09-28 ENCOUNTER — OFFICE VISIT (OUTPATIENT)
Dept: GYNECOLOGY | Facility: CLINIC | Age: 52
End: 2022-09-28
Payer: MEDICAID

## 2022-09-28 VITALS
BODY MASS INDEX: 36.67 KG/M2 | SYSTOLIC BLOOD PRESSURE: 131 MMHG | WEIGHT: 233.63 LBS | DIASTOLIC BLOOD PRESSURE: 86 MMHG | OXYGEN SATURATION: 100 % | TEMPERATURE: 98 F | HEART RATE: 64 BPM | RESPIRATION RATE: 18 BRPM | HEIGHT: 67 IN

## 2022-09-28 DIAGNOSIS — E66.9 OBESITY, UNSPECIFIED CLASSIFICATION, UNSPECIFIED OBESITY TYPE, UNSPECIFIED WHETHER SERIOUS COMORBIDITY PRESENT: ICD-10-CM

## 2022-09-28 DIAGNOSIS — Z01.419 ENCOUNTER FOR ANNUAL ROUTINE GYNECOLOGICAL EXAMINATION: Primary | ICD-10-CM

## 2022-09-28 DIAGNOSIS — R45.0 JITTERY FEELING: ICD-10-CM

## 2022-09-28 DIAGNOSIS — N89.8 VAGINAL ODOR: ICD-10-CM

## 2022-09-28 LAB
CLUE CELLS VAG QL WET PREP: ABNORMAL
GLUCOSE SERPL-MCNC: 134 MG/DL (ref 70–110)
T VAGINALIS VAG QL WET PREP: ABNORMAL
WBC #/AREA VAG WET PREP: ABNORMAL
YEAST SPEC QL WET PREP: ABNORMAL

## 2022-09-28 PROCEDURE — 1159F PR MEDICATION LIST DOCUMENTED IN MEDICAL RECORD: ICD-10-PCS | Mod: CPTII,,, | Performed by: NURSE PRACTITIONER

## 2022-09-28 PROCEDURE — 82962 GLUCOSE BLOOD TEST: CPT | Mod: PBBFAC | Performed by: NURSE PRACTITIONER

## 2022-09-28 PROCEDURE — 87210 SMEAR WET MOUNT SALINE/INK: CPT | Performed by: NURSE PRACTITIONER

## 2022-09-28 PROCEDURE — 1160F RVW MEDS BY RX/DR IN RCRD: CPT | Mod: CPTII,,, | Performed by: NURSE PRACTITIONER

## 2022-09-28 PROCEDURE — 3008F BODY MASS INDEX DOCD: CPT | Mod: CPTII,,, | Performed by: NURSE PRACTITIONER

## 2022-09-28 PROCEDURE — 3079F DIAST BP 80-89 MM HG: CPT | Mod: CPTII,,, | Performed by: NURSE PRACTITIONER

## 2022-09-28 PROCEDURE — 3008F PR BODY MASS INDEX (BMI) DOCUMENTED: ICD-10-PCS | Mod: CPTII,,, | Performed by: NURSE PRACTITIONER

## 2022-09-28 PROCEDURE — 3079F PR MOST RECENT DIASTOLIC BLOOD PRESSURE 80-89 MM HG: ICD-10-PCS | Mod: CPTII,,, | Performed by: NURSE PRACTITIONER

## 2022-09-28 PROCEDURE — 99396 PR PREVENTIVE VISIT,EST,40-64: ICD-10-PCS | Mod: S$PBB,,, | Performed by: NURSE PRACTITIONER

## 2022-09-28 PROCEDURE — 3075F PR MOST RECENT SYSTOLIC BLOOD PRESS GE 130-139MM HG: ICD-10-PCS | Mod: CPTII,,, | Performed by: NURSE PRACTITIONER

## 2022-09-28 PROCEDURE — 3075F SYST BP GE 130 - 139MM HG: CPT | Mod: CPTII,,, | Performed by: NURSE PRACTITIONER

## 2022-09-28 PROCEDURE — 1160F PR REVIEW ALL MEDS BY PRESCRIBER/CLIN PHARMACIST DOCUMENTED: ICD-10-PCS | Mod: CPTII,,, | Performed by: NURSE PRACTITIONER

## 2022-09-28 PROCEDURE — 1159F MED LIST DOCD IN RCRD: CPT | Mod: CPTII,,, | Performed by: NURSE PRACTITIONER

## 2022-09-28 PROCEDURE — 99214 OFFICE O/P EST MOD 30 MIN: CPT | Mod: PBBFAC | Performed by: NURSE PRACTITIONER

## 2022-09-28 PROCEDURE — 99396 PREV VISIT EST AGE 40-64: CPT | Mod: S$PBB,,, | Performed by: NURSE PRACTITIONER

## 2022-09-28 NOTE — PROGRESS NOTES
"  Subjective:       Patient ID: Joyce Marie Boast is a 52 y.o. female.    Chief Complaint:  Well Woman    History of Present Illness  The patient  here for annual exam. Pt is perimenopausal. Her LMP was 2022. Pt was last seen in 2021 with Gyn for AUB-O. Pt was placed on norethindrone 5 mg however only took for 3 years then stopped since she was not bleeding. Denies history of abnormal paps. Last pap -NIL and HPV neg.  MG-21 & BIRADS 1. Denies breast or urinary complaints. Denies pelvic pain, abnormal bleeding or discharge.  Pt does c/o vaginal odor, uses scented soaps and douches occ. Pt reports no STIs in the past, recent STI screening was negative. Contraception consist of TL. Denies tobacco use. Dep. screening 0. Denies fly hx of breast, uterine or colon cancer. MGM with ovarian cancer.    GYN & OB History  Patient's last menstrual period was 2022 (approximate).   Date of Last Pap: 2021    Review of patient's allergies indicates:  No Known Allergies  Past Medical History:   Diagnosis Date    Diabetes mellitus, type 2     Hypertension      OB History    Para Term  AB Living   2 2           SAB IAB Ectopic Multiple Live Births                  # Outcome Date GA Lbr Akira/2nd Weight Sex Delivery Anes PTL Lv   2 Para            1 Para                 Review of Systems  Review of Systems    Negative except for pertinent findings for positives per HPI     Objective:    Physical Exam    /86 (BP Location: Right arm, Patient Position: Sitting, BP Method: Large (Automatic))   Pulse 64   Temp 97.5 °F (36.4 °C)   Resp 18   Ht 5' 7" (1.702 m)   Wt 106 kg (233 lb 9.6 oz)   LMP 2022 (Approximate)   SpO2 100%   BMI 36.59 kg/m²   GENERAL: Well-developed female in no acute distress.  SKIN: Normal to inspection, warm and intact.  BREASTS: No masses, lumps, discharge, tenderness.  VULVA: General appearance normal; external genitalia with no lesions or erythema.  VAGINA: " Mucosa atrophic, no discharge or lesions.  CERVIX: Atrophic, no erythema or discharge.  BIMANUAL EXAM: reveals a 12 week-sized uterus. The uterus is non tender. Toby adnexa reveal no evidence of masses, tenderness.  PSYCHIATRIC: Patient is oriented to person, place, and time. Mood and affect are normal.    Assessment:       1. Encounter for annual routine gynecological examination    2. Vaginal odor    3. Obesity, unspecified classification, unspecified obesity type, unspecified whether serious comorbidity present       Plan:   Corinne was seen today for well woman.    Diagnoses and all orders for this visit:    Encounter for annual routine gynecological examination    Vaginal odor  -     Wet Prep, Genital    Obesity, unspecified classification, unspecified obesity type, unspecified whether serious comorbidity present   Pelvic today, Pap utd per ACOG  Wet prep for vaginal odor  One full year with no cycle will indicate postmenopausal. Pt educated that cycles can still return during this perimenopausal time and could be heavy.  Healthy lifestyle choices. Importance of maintaining a healthy BMI. Healthy diet including limiting fast foods, processed foods, foods containing high amounts of saturated fats or high sodium content. Recommend low carb, low fat diet rich in lean meat and fish, non starchy vegetables, fruits and good fat while maintaining portion control. Limit sugar intake. Recommended plenty of water, avoiding carbonated beverages and high fructose corn syrup. Regular cardiovascular exercise, at least 150 minutes of cardiovascular exercise (at least 30 mins 5x/week).  Follow up in about 1 year (around 9/28/2023) for annual exam.

## 2022-09-28 NOTE — PROGRESS NOTES
"Patient requested her sugar be checked due to her, "feeling jittery this morning". Patient admits to eating a pop tart this morning. Off hand patient mentioned that she restarted taking metformin last Wednesday and does not feel like she needs to take this medication. CBG was checked in clinic. Results: 134. Reported to patient and Fatuma Laguna NP. Upon discharge patient stated that she feels fine and she no longer feels jittery. Patient left in stable condition.     Advised patient to address her concerns in regards to taking the metformin to Ms Srinivasan. Patient states she will follow up with her.     "

## 2022-12-02 ENCOUNTER — TELEPHONE (OUTPATIENT)
Dept: INTERNAL MEDICINE | Facility: CLINIC | Age: 52
End: 2022-12-02
Payer: MEDICAID

## 2022-12-02 NOTE — TELEPHONE ENCOUNTER
Pt called stating she's been having skin flare-ups when outside in the sun. Wants to know what does PCP recommend?

## 2022-12-06 NOTE — TELEPHONE ENCOUNTER
Spoke with patient and informed of all CINDY MERAZ's recommendations. Pt was transferred to scheduling stating she would like to sit with her provider to figure out the cause of these skin flare-ups.

## 2023-01-19 ENCOUNTER — HOSPITAL ENCOUNTER (OUTPATIENT)
Dept: RADIOLOGY | Facility: HOSPITAL | Age: 53
Discharge: HOME | End: 2023-01-19
Attending: NURSE PRACTITIONER
Payer: MEDICAID

## 2023-01-19 DIAGNOSIS — Z12.31 ENCOUNTER FOR SCREENING MAMMOGRAM FOR MALIGNANT NEOPLASM OF BREAST: ICD-10-CM

## 2023-01-19 PROCEDURE — 77063 MAMMO DIGITAL SCREENING BILAT WITH TOMO: ICD-10-PCS | Mod: 26,,, | Performed by: RADIOLOGY

## 2023-01-19 PROCEDURE — 77067 MAMMO DIGITAL SCREENING BILAT WITH TOMO: ICD-10-PCS | Mod: 26,,, | Performed by: RADIOLOGY

## 2023-01-19 PROCEDURE — 77067 SCR MAMMO BI INCL CAD: CPT | Mod: 26,,, | Performed by: RADIOLOGY

## 2023-01-19 PROCEDURE — 77063 BREAST TOMOSYNTHESIS BI: CPT | Mod: 26,,, | Performed by: RADIOLOGY

## 2023-01-19 PROCEDURE — 77067 SCR MAMMO BI INCL CAD: CPT | Mod: TC

## 2023-01-23 ENCOUNTER — OFFICE VISIT (OUTPATIENT)
Dept: INTERNAL MEDICINE | Facility: CLINIC | Age: 53
End: 2023-01-23
Payer: MEDICAID

## 2023-01-23 VITALS
BODY MASS INDEX: 37.82 KG/M2 | SYSTOLIC BLOOD PRESSURE: 120 MMHG | HEART RATE: 71 BPM | DIASTOLIC BLOOD PRESSURE: 80 MMHG | WEIGHT: 240.94 LBS | HEIGHT: 67 IN | TEMPERATURE: 98 F | RESPIRATION RATE: 18 BRPM

## 2023-01-23 DIAGNOSIS — E11.3292 TYPE 2 DIABETES MELLITUS WITH LEFT EYE AFFECTED BY MILD NONPROLIFERATIVE RETINOPATHY WITHOUT MACULAR EDEMA, WITHOUT LONG-TERM CURRENT USE OF INSULIN: ICD-10-CM

## 2023-01-23 DIAGNOSIS — I10 PRIMARY HYPERTENSION: ICD-10-CM

## 2023-01-23 DIAGNOSIS — E66.01 CLASS 2 SEVERE OBESITY DUE TO EXCESS CALORIES WITH SERIOUS COMORBIDITY AND BODY MASS INDEX (BMI) OF 37.0 TO 37.9 IN ADULT: ICD-10-CM

## 2023-01-23 DIAGNOSIS — N93.9 ABNORMAL UTERINE BLEEDING: ICD-10-CM

## 2023-01-23 DIAGNOSIS — Z00.00 WELLNESS EXAMINATION: ICD-10-CM

## 2023-01-23 DIAGNOSIS — Z12.11 SCREENING FOR MALIGNANT NEOPLASM OF COLON: ICD-10-CM

## 2023-01-23 DIAGNOSIS — K02.9 DENTAL CARIES: ICD-10-CM

## 2023-01-23 DIAGNOSIS — K59.04 CHRONIC IDIOPATHIC CONSTIPATION: ICD-10-CM

## 2023-01-23 DIAGNOSIS — G47.33 OBSTRUCTIVE SLEEP APNEA SYNDROME: ICD-10-CM

## 2023-01-23 DIAGNOSIS — E11.3292 MILD NONPROLIFERATIVE DIABETIC RETINOPATHY OF LEFT EYE WITHOUT MACULAR EDEMA ASSOCIATED WITH TYPE 2 DIABETES MELLITUS: Primary | ICD-10-CM

## 2023-01-23 PROBLEM — E66.812 CLASS 2 SEVERE OBESITY DUE TO EXCESS CALORIES WITH SERIOUS COMORBIDITY AND BODY MASS INDEX (BMI) OF 37.0 TO 37.9 IN ADULT: Status: ACTIVE | Noted: 2023-01-23

## 2023-01-23 PROCEDURE — 99396 PR PREVENTIVE VISIT,EST,40-64: ICD-10-PCS | Mod: S$PBB,,, | Performed by: NURSE PRACTITIONER

## 2023-01-23 PROCEDURE — 99214 OFFICE O/P EST MOD 30 MIN: CPT | Mod: 25,S$PBB,, | Performed by: NURSE PRACTITIONER

## 2023-01-23 PROCEDURE — 99214 OFFICE O/P EST MOD 30 MIN: CPT | Mod: PBBFAC | Performed by: NURSE PRACTITIONER

## 2023-01-23 PROCEDURE — 3079F PR MOST RECENT DIASTOLIC BLOOD PRESSURE 80-89 MM HG: ICD-10-PCS | Mod: CPTII,,, | Performed by: NURSE PRACTITIONER

## 2023-01-23 PROCEDURE — 3074F SYST BP LT 130 MM HG: CPT | Mod: CPTII,,, | Performed by: NURSE PRACTITIONER

## 2023-01-23 PROCEDURE — 3074F PR MOST RECENT SYSTOLIC BLOOD PRESSURE < 130 MM HG: ICD-10-PCS | Mod: CPTII,,, | Performed by: NURSE PRACTITIONER

## 2023-01-23 PROCEDURE — 99214 PR OFFICE/OUTPT VISIT, EST, LEVL IV, 30-39 MIN: ICD-10-PCS | Mod: 25,S$PBB,, | Performed by: NURSE PRACTITIONER

## 2023-01-23 PROCEDURE — 3079F DIAST BP 80-89 MM HG: CPT | Mod: CPTII,,, | Performed by: NURSE PRACTITIONER

## 2023-01-23 PROCEDURE — 3008F BODY MASS INDEX DOCD: CPT | Mod: CPTII,,, | Performed by: NURSE PRACTITIONER

## 2023-01-23 PROCEDURE — 3008F PR BODY MASS INDEX (BMI) DOCUMENTED: ICD-10-PCS | Mod: CPTII,,, | Performed by: NURSE PRACTITIONER

## 2023-01-23 PROCEDURE — 99396 PREV VISIT EST AGE 40-64: CPT | Mod: S$PBB,,, | Performed by: NURSE PRACTITIONER

## 2023-01-23 RX ORDER — PRAVASTATIN SODIUM 20 MG/1
20 TABLET ORAL NIGHTLY
Qty: 90 TABLET | Refills: 3 | Status: SHIPPED | OUTPATIENT
Start: 2023-01-23 | End: 2024-01-08 | Stop reason: SDUPTHER

## 2023-01-23 RX ORDER — AMLODIPINE BESYLATE 5 MG/1
5 TABLET ORAL NIGHTLY
Qty: 90 TABLET | Refills: 3 | Status: SHIPPED | OUTPATIENT
Start: 2023-01-23 | End: 2024-01-08 | Stop reason: SDUPTHER

## 2023-01-23 NOTE — PROGRESS NOTES
Brianna Srinivasan, MARIYA   [facility]  [address] [address]      PATIENT NAME: Joyce Marie Boast  : 1970  DATE: 23  MRN: [id number]      Reason for Visit / Chief Complaint: Follow-up (Lab results )       History of Present Illness / Problem Focused Workflow     Joyce Marie Boast is a 52 y.o. Black or  female presents to the clinic for DM and HTN f/u. PM HTN, constipation, AUB, DM, DANA on CPAP, liposuction of abd (3/28/22) and back (22) and obesity. Followed by [facility] and [facility].     Today, reports is only taking amlodipine. Stopped taking metformin. Has been attempting ADA/low sodium diet. Labs reviewed with pt. Declines vaccines today. Denies chest pain, shortness of breath, cough, fever, headache, dizziness, weakness, abdominal pain, nausea, vomiting, diarrhea, constipation, dysuria, depression, anxiety, SI/HI.    Review of Systems     Review of Systems     See HPI for details    Constitutional: Denies Change in appetite. Denies Chills. Denies Fever. Denies Night sweats.  Eye: Denies Blurred vision. Denies Discharge. Denies Eye pain.  ENT: Denies Decreased hearing. Denies Sore throat. Denies Swollen glands.  Respiratory: Denies Cough. Denies Shortness of breath. Denies Shortness of breath with exertion. Denies Wheezing.  Cardiovascular: DeniesChest pain at rest. Denies Chest pain with exertion. Denies Irregular heartbeat. Denies Palpitations. Denies Edema.  Gastrointestinal: Denies Abdominal pain. Denies Diarrhea. Denies Nausea. Denies Vomiting. Denies Hematemesis or Hematochezia.  Genitourinary: Denies Dysuria. Denies Urinary frequency. Denies Urinary urgency. Denies Blood in urine.  Endocrine: Denies Cold intolerance. Denies Excessive thirst. Denies Heat intolerance. Denies Weight loss. Denies Weight gain.  Musculoskeletal: Denies Painful joints. Denies Weakness.  Integumentary:  Denies Rash. Denies Itching. Denies Dry skin.  Neurologic: Denies Dizziness. Denies Fainting. Denies Headache.  Psychiatric: Denies Depression. Denies Anxiety. Denies Suicidal/Homicidal ideations.    All Other ROS: Negative except as stated in HPI.     Medical / Surgical / Social / Family History       ----------------------------  Diabetes mellitus, type 2  Hypertension     Past Surgical History:   Procedure Laterality Date    CARPAL TUNNEL RELEASE Bilateral     TUBAL LIGATION         Social History     Socioeconomic History    Marital status: Single   Tobacco Use    Smoking status: Never    Smokeless tobacco: Never   Substance and Sexual Activity    Alcohol use: Not Currently     Comment: wine on occ    Drug use: Never    Sexual activity: Yes     Birth control/protection: See Surgical Hx     Social Determinants of Health     Transportation Needs: No Transportation Needs    Lack of Transportation (Medical): No    Lack of Transportation (Non-Medical): No   Physical Activity: Inactive    Days of Exercise per Week: 0 days    Minutes of Exercise per Session: 0 min   Social Connections: Moderately Isolated    Frequency of Communication with Friends and Family: More than three times a week    Frequency of Social Gatherings with Friends and Family: Three times a week    Attends Yarsani Services: More than 4 times per year    Active Member of Clubs or Organizations: No    Attends Club or Organization Meetings: Never    Marital Status:    Housing Stability: Low Risk     Unable to Pay for Housing in the Last Year: No    Number of Places Lived in the Last Year: 1    Unstable Housing in the Last Year: No        Family History   Problem Relation Age of Onset    Cancer Maternal Grandmother         Medications and Allergies     Medications  Outpatient Medications Marked as Taking for the 1/23/23 encounter (Office Visit) with MARIYA Peña   Medication Sig Dispense Refill    [DISCONTINUED] amLODIPine (NORVASC) 5  "MG tablet Take 1 tablet (5 mg total) by mouth every evening. 90 tablet 1         Allergies  Review of patient's allergies indicates:  No Known Allergies    Physical Examination     /80 (BP Location: Left arm, Patient Position: Sitting, BP Method: Large (Manual))   Pulse 71   Temp 97.9 °F (36.6 °C) (Oral)   Resp 18   Ht 5' 7.01" (1.702 m)   Wt 109.3 kg (240 lb 15.4 oz)   LMP 01/22/2023   BMI 37.73 kg/m²     Physical Exam  Constitutional:       Appearance: She is obese.       General: Alert and oriented, No acute distress.  Head: Normocephalic, Atraumatic.  Eye: Pupils are equal, round and reactive to light, Extraocular movements are intact, Sclera non-icteric.  Ears/Nose/Throat: Normal, Mucosa moist,Clear.  Neck/Thyroid: Supple, Non-tender, No carotid bruit, No lymphadenopathy, No JVD, Full range of motion.  Respiratory: Clear to auscultation bilaterally; No wheezes, rales or rhonchi,Non-labored respirations, Symmetrical chest wall expansion.  Cardiovascular: Regular rate and rhythm, S1/S2 normal, No murmurs, rubs or gallops.  Gastrointestinal: Soft, Non-tender, Non-distended, Normal bowel sounds, No palpable organomegaly.  Musculoskeletal: Normal range of motion.  Integumentary: Warm, Dry, Intact, No suspicious lesions or rashes.  Extremities: No clubbing, cyanosis or edema  Neurologic: No focal deficits, Cranial Nerves II-XII are grossly intact, Motor strength normal upper and lower extremities, Sensory exam intact.  Psychiatric: Normal interaction, Coherent speech, Euthymic mood, Appropriate affect       Results     Lab Results   Component Value Date    WBC 8.0 01/23/2023    HGB 14.7 01/23/2023    HCT 45.5 01/23/2023     01/23/2023    CHOL 214 (H) 01/23/2023    TRIG 126 01/23/2023    ALT 15 01/23/2023    AST 17 01/23/2023     01/23/2023    K 4.0 01/23/2023    CREATININE 0.85 01/23/2023    BUN 9.7 (L) 01/23/2023    CO2 24 01/23/2023    TSH 2.214 01/23/2023    INR 1.02 04/10/2020    HGBA1C " 6.2 01/23/2023     Mammo Digital Screening Bilat w/ Thania  Order: [id number]  Status: Final result     Visible to patient: Yes (not seen)     Next appt: 10/02/2023 at 12:30 PM in Gynecology (Fatuma Laguna, ANP)     Dx: Encounter for screening mammogram for...     0 Result Notes    1 HM Topic  Details    Reading Physician Reading Date Result Priority   Freddy Barahona MD  [phone] 1/19/2023      Physician Responsible for MQSA Outcome Reason    Freddy Barahona MD Signed      Narrative & Impression      - MAMMO DIGITAL SCREENING BILAT WITH THANIA     BILATERAL DIGITAL SCREENING MAMMOGRAM 3D/2D WITH CAD: 1/19/2023  HISTORY: 52-year-old woman presents for screening mammogram.       COMPARISONS: Comparison is made to exams dated:  12/6/2021 mammogram - [facility], 8/5/2013 mammogram - [facility], 12/1/2020 mammogram - [facility], 9/29/2010 mammogram, and 11/3/2008 mammogram.       TECHNIQUE: Digital mammography views were performed with tomosynthesis. Current study was evaluated with a Computer Aided Detection (CAD) system.      BREAST COMPOSITION: There are scattered areas of fibroglandular tissue.       FINDINGS:   No suspicious mass, asymmetry, distortion, or calcification is identified.         IMPRESSION: NEGATIVE  Right Breast: Negative (BI-RADS 1)  Left Breast: Negative (BI-RADS 1)     Recommendations:  Recommend continued annual screening mammography (with Digital Breast Tomosynthesis), according to American College of Radiology guidelines.           Freddy Barahona M.D.            lm/:1/19/2023 18:04:31          letter sent: Mammography Normal    Mammogram BI-RADS: 1 Negative       Assessment and Plan (including Health Maintenance)     Plan:     1. Wellness examination  Cervical Cancer Screening - Last Pap/pelvic on 9/28/22. Follow up with GYN Clinic for annual Pap/Pelvic.  Breast Cancer Screening - Last Mammogram on 1/19/23.  Jose Elias . Follow up annually.  Colon Cancer Screening - FIT negative on 1/11/22. Cologuard ordered.   Eye Exam - Last Eye Exam in 2022 at [facility]. Will call for appt.   Dental Exam - Several years. List of local dentists given to patient.  Vaccinations: Flu - / Covid - / Pneumonia - / Shingles - / Tetanus -  Labwork - UTD      2. Type 2 diabetes mellitus with left eye affected by mild nonproliferative retinopathy without macular edema, without long-term current use of insulin  A1C 6.2 at goal. Previous A1C 6.5.   Discussed with pt regarding metformin.  States will resume taking.   Continue medications as prescribed.  Follow ADA diet.  Avoid soda, simple sweets, and limit rice/pasta/bread/starches and consume brown options when possible.   Maintain healthy weight with BMI goal <30.   Perform aerobic exercise for 150 minutes per week (or 5 days a week for 30 minutes each day).   Examine feet daily.   Obtain annual dilated eye exam.  Eye exam: 6/1/22  Foot exam: 4/12/22    3. Primary hypertension  At goal.  Continue amlodipine.  Follow a low sodium (less than 2 grams of sodium per day), DASH diet.   Continue medications as prescribed.  Monitor blood pressure and report any consistent values greater than 140/90 and keep a log.  Maintain healthy weight with a BMI goal of <30.   Aerobic exercise for 150 minutes per week (or 5 days a week for 30 minutes each day).    4. Mild nonproliferative diabetic retinopathy of left eye without macular edema associated with type 2 diabetes mellitus  Pt to contact [facility] for appt.     5. Abnormal uterine bleeding  Pt is perimenopausal.  Instructed on how to track cycles.     6. Chronic idiopathic constipation  Take miralax and stool softener daily.  Educated on high fiber diet and exercise.  Drink at least 64 ozs of water daily.  Eat hot breakfast q am.      7. Obstructive sleep apnea syndrome  Reports compliance with wearing CPAP nightly.  Reports decreased EDS,  snoring and fatigue.  Pt is benefiting from its use.  Expect lifetime use and decreased daytime sleepiness/fatigue.   Avoid excessive alcohol, smoking and overuse of sedatives at bedtime.  Weight management discussed.  Adjust sleep position as needed for increased comfort.      8. Class 2 severe obesity due to excess calories with serious comorbidity and body mass index (BMI) of 37.0 to 37.9 in adult  BMI 37. Goal BMI <30.  Aerobic exercise 150 minutes per week.  Avoid soda, simple sugars, sweets, excessive rice, pasta, potatoes or bread.   Choose brown options when available and portion control.  Limit fast foods and fried foods.   Choose complex carbs in moderation (ex: green, leafy vegetables, beans, oatmeal).  Eat plenty of fresh fruits and vegetables with lean meats daily.   Consider permanent healthy lifestyle changes.    Problem List Items Addressed This Visit          Ophtho    Mild nonproliferative diabetic retinopathy of left eye without macular edema associated with type 2 diabetes mellitus - Primary       Cardiac/Vascular    Hypertension       Renal/    Abnormal uterine bleeding       Endocrine    Diabetes mellitus    Relevant Orders    Hemoglobin A1C    Class 2 severe obesity due to excess calories with serious comorbidity and body mass index (BMI) of 37.0 to 37.9 in adult       GI    Constipation       Other    Obstructive sleep apnea syndrome    Wellness examination     Other Visit Diagnoses       Screening for malignant neoplasm of colon        Relevant Orders    Cologuard Screening (Multitarget Stool DNA)    Dental caries        Relevant Orders    Ambulatory referral/consult to Dentistry               Health Maintenance Due   Topic Date Due    Diabetes Urine Screening  Never done    Pneumococcal Vaccines (Age 0-64) (1 - PCV) Never done    Foot Exam  Never done    Colorectal Cancer Screening  Never done    COVID-19 Vaccine (5 - Booster for Pfizer series) 07/12/2022       Follow up in about 6 months  (around 7/23/2023). Labs one week prior to appt.         Signature:  MARIYA Gonzalez  [facility]  [address] [address]

## 2023-02-08 ENCOUNTER — PATIENT MESSAGE (OUTPATIENT)
Dept: ADMINISTRATIVE | Facility: HOSPITAL | Age: 53
End: 2023-02-08
Payer: MEDICAID

## 2023-02-08 LAB — NONINV COLON CA DNA+OCC BLD SCRN STL QL: NEGATIVE

## 2023-02-14 ENCOUNTER — TELEPHONE (OUTPATIENT)
Dept: INTERNAL MEDICINE | Facility: CLINIC | Age: 53
End: 2023-02-14
Payer: MEDICAID

## 2023-02-14 NOTE — TELEPHONE ENCOUNTER
----- Message from MARIYA Peña sent at 2/10/2023  4:00 PM CST -----  Negative cologuard. Repeat in 3 years.

## 2023-04-24 PROBLEM — Z00.00 WELLNESS EXAMINATION: Status: RESOLVED | Noted: 2023-01-23 | Resolved: 2023-04-24

## 2023-06-14 ENCOUNTER — OFFICE VISIT (OUTPATIENT)
Dept: INTERNAL MEDICINE | Facility: CLINIC | Age: 53
End: 2023-06-14
Payer: MEDICAID

## 2023-06-14 ENCOUNTER — HOSPITAL ENCOUNTER (OUTPATIENT)
Dept: RADIOLOGY | Facility: HOSPITAL | Age: 53
Discharge: HOME | End: 2023-06-14
Attending: NURSE PRACTITIONER
Payer: MEDICAID

## 2023-06-14 VITALS
DIASTOLIC BLOOD PRESSURE: 88 MMHG | HEIGHT: 67 IN | HEART RATE: 71 BPM | WEIGHT: 237.63 LBS | RESPIRATION RATE: 18 BRPM | SYSTOLIC BLOOD PRESSURE: 125 MMHG | BODY MASS INDEX: 37.3 KG/M2 | TEMPERATURE: 98 F

## 2023-06-14 DIAGNOSIS — M25.542 JOINT PAIN IN FINGERS OF BOTH HANDS: ICD-10-CM

## 2023-06-14 DIAGNOSIS — E11.3292 TYPE 2 DIABETES MELLITUS WITH LEFT EYE AFFECTED BY MILD NONPROLIFERATIVE RETINOPATHY WITHOUT MACULAR EDEMA, WITHOUT LONG-TERM CURRENT USE OF INSULIN: Chronic | ICD-10-CM

## 2023-06-14 DIAGNOSIS — E66.01 CLASS 2 SEVERE OBESITY DUE TO EXCESS CALORIES WITH SERIOUS COMORBIDITY AND BODY MASS INDEX (BMI) OF 37.0 TO 37.9 IN ADULT: Primary | Chronic | ICD-10-CM

## 2023-06-14 DIAGNOSIS — M25.541 JOINT PAIN IN FINGERS OF BOTH HANDS: ICD-10-CM

## 2023-06-14 PROBLEM — E11.9 DIABETES MELLITUS: Chronic | Status: ACTIVE | Noted: 2022-09-21

## 2023-06-14 PROBLEM — E66.812 CLASS 2 SEVERE OBESITY DUE TO EXCESS CALORIES WITH SERIOUS COMORBIDITY AND BODY MASS INDEX (BMI) OF 37.0 TO 37.9 IN ADULT: Chronic | Status: ACTIVE | Noted: 2023-01-23

## 2023-06-14 PROBLEM — G47.33 OBSTRUCTIVE SLEEP APNEA SYNDROME: Chronic | Status: ACTIVE | Noted: 2022-09-21

## 2023-06-14 PROBLEM — I10 HYPERTENSION: Chronic | Status: ACTIVE | Noted: 2022-09-21

## 2023-06-14 PROCEDURE — 99214 OFFICE O/P EST MOD 30 MIN: CPT | Mod: PBBFAC | Performed by: NURSE PRACTITIONER

## 2023-06-14 PROCEDURE — 73120 X-RAY EXAM OF HAND: CPT | Mod: TC,50

## 2023-06-14 PROCEDURE — 3008F PR BODY MASS INDEX (BMI) DOCUMENTED: ICD-10-PCS | Mod: CPTII,,, | Performed by: NURSE PRACTITIONER

## 2023-06-14 PROCEDURE — 1159F PR MEDICATION LIST DOCUMENTED IN MEDICAL RECORD: ICD-10-PCS | Mod: CPTII,,, | Performed by: NURSE PRACTITIONER

## 2023-06-14 PROCEDURE — 1159F MED LIST DOCD IN RCRD: CPT | Mod: CPTII,,, | Performed by: NURSE PRACTITIONER

## 2023-06-14 PROCEDURE — 81001 URINALYSIS AUTO W/SCOPE: CPT

## 2023-06-14 PROCEDURE — 87088 URINE BACTERIA CULTURE: CPT | Performed by: NURSE PRACTITIONER

## 2023-06-14 PROCEDURE — 3079F PR MOST RECENT DIASTOLIC BLOOD PRESSURE 80-89 MM HG: ICD-10-PCS | Mod: CPTII,,, | Performed by: NURSE PRACTITIONER

## 2023-06-14 PROCEDURE — 3079F DIAST BP 80-89 MM HG: CPT | Mod: CPTII,,, | Performed by: NURSE PRACTITIONER

## 2023-06-14 PROCEDURE — 1160F PR REVIEW ALL MEDS BY PRESCRIBER/CLIN PHARMACIST DOCUMENTED: ICD-10-PCS | Mod: CPTII,,, | Performed by: NURSE PRACTITIONER

## 2023-06-14 PROCEDURE — 1160F RVW MEDS BY RX/DR IN RCRD: CPT | Mod: CPTII,,, | Performed by: NURSE PRACTITIONER

## 2023-06-14 PROCEDURE — 99214 OFFICE O/P EST MOD 30 MIN: CPT | Mod: S$PBB,,, | Performed by: NURSE PRACTITIONER

## 2023-06-14 PROCEDURE — 99214 PR OFFICE/OUTPT VISIT, EST, LEVL IV, 30-39 MIN: ICD-10-PCS | Mod: S$PBB,,, | Performed by: NURSE PRACTITIONER

## 2023-06-14 PROCEDURE — 3008F BODY MASS INDEX DOCD: CPT | Mod: CPTII,,, | Performed by: NURSE PRACTITIONER

## 2023-06-14 PROCEDURE — 82043 UR ALBUMIN QUANTITATIVE: CPT

## 2023-06-14 PROCEDURE — 3074F PR MOST RECENT SYSTOLIC BLOOD PRESSURE < 130 MM HG: ICD-10-PCS | Mod: CPTII,,, | Performed by: NURSE PRACTITIONER

## 2023-06-14 PROCEDURE — 3074F SYST BP LT 130 MM HG: CPT | Mod: CPTII,,, | Performed by: NURSE PRACTITIONER

## 2023-06-14 RX ORDER — TIZANIDINE 4 MG/1
4 TABLET ORAL EVERY 8 HOURS
Qty: 30 TABLET | Refills: 1 | Status: SHIPPED | OUTPATIENT
Start: 2023-06-14 | End: 2023-07-24 | Stop reason: SDUPTHER

## 2023-06-14 RX ORDER — METHYLPREDNISOLONE 4 MG/1
TABLET ORAL
Qty: 21 EACH | Refills: 0 | Status: SHIPPED | OUTPATIENT
Start: 2023-06-14 | End: 2023-07-24 | Stop reason: ALTCHOICE

## 2023-06-14 RX ORDER — DICLOFENAC SODIUM 75 MG/1
75 TABLET, DELAYED RELEASE ORAL 2 TIMES DAILY PRN
Qty: 45 TABLET | Refills: 1 | Status: SHIPPED | OUTPATIENT
Start: 2023-06-14 | End: 2023-07-24 | Stop reason: SDUPTHER

## 2023-06-14 NOTE — PROGRESS NOTES
Brianna Srinivasan, MARIYA   [facility]  [address] [address]      PATIENT NAME: Joyce Marie Boast  : 1970  DATE: 23  MRN: [id number]      Reason for Visit / Chief Complaint: Hand Pain (Bilat. 3rd finger pain 9/10 x 2 months)       History of Present Illness / Problem Focused Workflow     Joyce Marie Boast is a 53 y.o. Black or  female presents to the clinic for finger pain. PMH HTN, constipation, AUB, DM, DANA on CPAP, liposuction of abd (3/28/22) and back (22) and obesity. Followed by [facility] and [facility].     Today pt presents with c/o shooting bilateral 2-3rd finger pain, 9/10 today, onset 2 months, worsened with movement and unable to make fist, improved with IBU TID or voltaren gel TID x 1 month but returns. Pt with hx of bilateral CTR in the past. Denies any numbness or tingling. Denies chest pain, shortness of breath, cough, fever, headache, dizziness, weakness, abdominal pain, nausea, vomiting, diarrhea, constipation, dysuria, depression, anxiety, SI/HI.    Review of Systems     Review of Systems     See HPI for details    Constitutional: Denies Change in appetite. Denies Chills. Denies Fever. Denies Night sweats.  Eye: Denies Blurred vision. Denies Discharge. Denies Eye pain.  ENT: Denies Decreased hearing. Denies Sore throat. Denies Swollen glands.  Respiratory: Denies Cough. Denies Shortness of breath. Denies Shortness of breath with exertion. Denies Wheezing.  Cardiovascular: Denies Chest pain at rest. Denies Chest pain with exertion. Denies Irregular heartbeat. Denies Palpitations. Denies Edema.  Gastrointestinal: Denies Abdominal pain. Denies Diarrhea. Denies Nausea. Denies Vomiting. Denies Hematemesis or Hematochezia.  Genitourinary: Denies Dysuria. Denies Urinary frequency. Denies Urinary urgency. Denies Blood in urine.  Endocrine: Denies Cold intolerance. Denies  Excessive thirst. Denies Heat intolerance. Denies Weight loss. Denies Weight gain.  Musculoskeletal: Admits Painful joints. Denies Weakness.  Integumentary: Denies Rash. Denies Itching. Denies Dry skin.  Neurologic: Denies Dizziness. Denies Fainting. Denies Headache.  Psychiatric: Denies Depression. Denies Anxiety. Denies Suicidal/Homicidal ideations.    All Other ROS: Negative except as stated in HPI.     Medical / Surgical / Social / Family History       ----------------------------  Diabetes mellitus, type 2  Hypertension     Past Surgical History:   Procedure Laterality Date    CARPAL TUNNEL RELEASE Bilateral     TUBAL LIGATION         Social History     Socioeconomic History    Marital status: Single   Tobacco Use    Smoking status: Never     Passive exposure: Never    Smokeless tobacco: Never   Substance and Sexual Activity    Alcohol use: Not Currently     Comment: wine on occ    Drug use: Never    Sexual activity: Yes     Birth control/protection: See Surgical Hx     Social Determinants of Health     Transportation Needs: No Transportation Needs    Lack of Transportation (Medical): No    Lack of Transportation (Non-Medical): No   Physical Activity: Inactive    Days of Exercise per Week: 0 days    Minutes of Exercise per Session: 0 min   Social Connections: Moderately Isolated    Frequency of Communication with Friends and Family: More than three times a week    Frequency of Social Gatherings with Friends and Family: Three times a week    Attends Zoroastrian Services: More than 4 times per year    Active Member of Clubs or Organizations: No    Attends Club or Organization Meetings: Never    Marital Status:    Housing Stability: Low Risk     Unable to Pay for Housing in the Last Year: No    Number of Places Lived in the Last Year: 1    Unstable Housing in the Last Year: No        Family History   Problem Relation Age of Onset    Cancer Maternal Grandmother         Medications and Allergies  "    Medications  Current Outpatient Medications   Medication Instructions    amLODIPine (NORVASC) 5 mg, Oral, Nightly    aspirin (ECOTRIN) 81 mg, Oral    blood sugar diagnostic Strp   Glucometer Strips, See Instructions, Use to check CBG daily and prn., # 50 EA, 11 Refill(s), Pharmacy: [facility] #[id number], 170, cm, Height/Length Dosing, 01/11/22 8:27:00 CST, 117.7, kg, Weight Dosing, 01/11/22 8:27:00 CST    blood-glucose meter kit   Glucometer, See Instructions, Use to check CBG daily and prn. Dx: diabetes, # 1 EA, 0 Refill(s), Pharmacy: [facility] #[id number], 170, cm, Height/Length Dosing, 01/11/22 8:27:00 CST, 117.7, kg, Weight Dosing, 01/11/22 8:27:00 CST    diclofenac (VOLTAREN) 75 mg, Oral, 2 times daily PRN    lancets OP (IHEALTH) OP   Glucometer Lancets, See Instructions, Use to CBG daily and prn., # 50 EA, 11 Refill(s), Pharmacy: [facility] #[id number], 170, cm, Height/Length Dosing, 01/11/22 8:27:00 CST, 117.7, kg, Weight Dosing, 01/11/22 8:27:00 CST    linaCLOtide (LINZESS) 290 mcg Cap capsule TAKE 1 CAPSULE BY MOUTH DAILY AS NEEDED FOR CONSTIPATION TAKE 30 MINUTES BEFORE BREAKFAST, ON AN ON AN EMPTY STOMACH    methylPREDNISolone (MEDROL DOSEPACK) 4 mg tablet use as directed    pravastatin (PRAVACHOL) 20 mg, Oral, Nightly    tiZANidine (ZANAFLEX) 4 mg, Oral, Every 8 hours, May cause drowsiness         Allergies  Review of patient's allergies indicates:  No Known Allergies    Physical Examination     /88 (BP Location: Right arm, Patient Position: Sitting, BP Method: Large (Automatic))   Pulse 71   Temp 97.9 °F (36.6 °C) (Oral)   Resp 18   Ht 5' 7.01" (1.702 m)   Wt 107.8 kg (237 lb 9.6 oz)   LMP 02/13/2023 (Within Weeks)   BMI 37.20 kg/m²     Physical Exam  Musculoskeletal:      Right hand: Swelling and deformity present. Decreased range of motion.      Left hand: Swelling and deformity present. Decreased range of motion.      Comments: Joint deformity and swelling of " bilateral 2-3rd        General: Alert and oriented, No acute distress.  Head: Normocephalic, Atraumatic.  Eye: Pupils are equal, round and reactive to light, Extraocular movements are intact, Sclera non-icteric.  Ears/Nose/Throat: Normal, Mucosa moist,Clear.  Neck/Thyroid: Supple, Non-tender, No carotid bruit, No lymphadenopathy, No JVD, Full range of motion.  Respiratory: Clear to auscultation bilaterally; No wheezes, rales or rhonchi,Non-labored respirations, Symmetrical chest wall expansion.  Cardiovascular: Regular rate and rhythm, S1/S2 normal, No murmurs, rubs or gallops.  Gastrointestinal: Soft, Non-tender, Non-distended, Normal bowel sounds, No palpable organomegaly.  Integumentary: Warm, Dry, Intact, No suspicious lesions or rashes.  Extremities: No clubbing, cyanosis or edema  Neurologic: No focal deficits, Cranial Nerves II-XII are grossly intact, Motor strength normal upper and lower extremities, Sensory exam intact.  Psychiatric: Normal interaction, Coherent speech, Appropriate affect       Results     Lab Results   Component Value Date    WBC 8.0 01/23/2023    HGB 14.7 01/23/2023    HCT 45.5 01/23/2023     01/23/2023    CHOL 214 (H) 01/23/2023    TRIG 126 01/23/2023    ALT 15 01/23/2023    AST 17 01/23/2023     01/23/2023    K 4.0 01/23/2023    CREATININE 0.85 01/23/2023    BUN 9.7 (L) 01/23/2023    CO2 24 01/23/2023    TSH 2.214 01/23/2023    INR 1.02 04/10/2020    HGBA1C 6.2 01/23/2023         Assessment and Plan (including Health Maintenance)     Plan:     1. Joint pain in fingers of both hands  XR bilateral hands today.  Rx medrol dose pack.  Rx diclofenac  and tizanidine prn.  **Do not take NSAIDs (Ibuprofen, Naproxen, Aleve, Advil, Toradol, Mobic, Diclofenac), may take only Tylenol as needed for pain/headaches while taking diclofenace.  Perform hands exercises daily.   Avoid activities than increase hand pain or stiffness.   Apply heating pad, ice pack, and BioFreeze/topical  capsaicin as needed; alternate every 15-20 minutes.   Continue tylenol arthritis/NSAID/muscle relaxer as needed.    - X-Ray Hand PA Bilateral; Future    2. Class 2 severe obesity due to excess calories with serious comorbidity and body mass index (BMI) of 37.0 to 37.9 in adult  BMI 37. Goal BMI <30.  Aerobic exercise 150 minutes per week.  Avoid soda, simple sugars, sweets, excessive rice, pasta, potatoes or bread.   Choose brown options when available and portion control.  Limit fast foods and fried foods.   Choose complex carbs in moderation (ex: green, leafy vegetables, beans, oatmeal).  Eat plenty of fresh fruits and vegetables with lean meats daily.   Consider permanent healthy lifestyle changes.          Problem List Items Addressed This Visit          Endocrine    Diabetes mellitus (Chronic)    Relevant Orders    Comprehensive Metabolic Panel    Class 2 severe obesity due to excess calories with serious comorbidity and body mass index (BMI) of 37.0 to 37.9 in adult - Primary (Chronic)       Orthopedic    Joint pain in fingers of both hands    Relevant Orders    X-Ray Hand PA Bilateral         Health Maintenance Due   Topic Date Due    Diabetes Urine Screening  Never done    Pneumococcal Vaccines (Age 0-64) (1 - PCV) Never done    Foot Exam  Never done    COVID-19 Vaccine (5 - Pfizer series) 07/12/2022    Eye Exam  06/01/2023       Follow up if symptoms worsen or fail to improve, for Keep appt as scheduled in July with labs prior to appt. .        Signature:  MARIYA Gonzalez  [facility]  [address] [address]

## 2023-06-19 ENCOUNTER — PATIENT MESSAGE (OUTPATIENT)
Dept: ADMINISTRATIVE | Facility: HOSPITAL | Age: 53
End: 2023-06-19
Payer: COMMERCIAL

## 2023-06-23 ENCOUNTER — TELEPHONE (OUTPATIENT)
Dept: INTERNAL MEDICINE | Facility: CLINIC | Age: 53
End: 2023-06-23
Payer: COMMERCIAL

## 2023-06-23 NOTE — TELEPHONE ENCOUNTER
Inform the pt a referral has been placed for PT. She should take tylenol arthritis/diclofenac and tizanidine prn for her pain. There is nothing stronger that can be provided. Advise on the following:  Perform hand exercises daily.    Apply heating pad, ice pack, and BioFreeze/topical capsaicin as needed; alternate every 15-20 minutes.   Continue tylenol arthritis/NSAID/muscle relaxer as needed.  Thanks

## 2023-06-23 NOTE — TELEPHONE ENCOUNTER
Contacted patient and gave results of x-ray, she stated she doesn't have a specify place to go to for PT but you can send her to one that'd closet to her address. She also asks if you can prescribe her some more methylprednisolone for her hand for the inflammation. Please advise.

## 2023-06-29 LAB
LEFT EYE DM RETINOPATHY: NEGATIVE
RIGHT EYE DM RETINOPATHY: NEGATIVE

## 2023-07-24 ENCOUNTER — OFFICE VISIT (OUTPATIENT)
Dept: INTERNAL MEDICINE | Facility: CLINIC | Age: 53
End: 2023-07-24
Payer: MEDICAID

## 2023-07-24 VITALS
TEMPERATURE: 98 F | HEIGHT: 67 IN | RESPIRATION RATE: 18 BRPM | DIASTOLIC BLOOD PRESSURE: 77 MMHG | BODY MASS INDEX: 37.7 KG/M2 | SYSTOLIC BLOOD PRESSURE: 138 MMHG | WEIGHT: 240.19 LBS | HEART RATE: 74 BPM

## 2023-07-24 DIAGNOSIS — K59.04 CHRONIC IDIOPATHIC CONSTIPATION: ICD-10-CM

## 2023-07-24 DIAGNOSIS — E11.3292 MILD NONPROLIFERATIVE DIABETIC RETINOPATHY OF LEFT EYE WITHOUT MACULAR EDEMA ASSOCIATED WITH TYPE 2 DIABETES MELLITUS: Chronic | ICD-10-CM

## 2023-07-24 DIAGNOSIS — G47.33 OBSTRUCTIVE SLEEP APNEA SYNDROME: Chronic | ICD-10-CM

## 2023-07-24 DIAGNOSIS — E11.3292 TYPE 2 DIABETES MELLITUS WITH LEFT EYE AFFECTED BY MILD NONPROLIFERATIVE RETINOPATHY WITHOUT MACULAR EDEMA, WITHOUT LONG-TERM CURRENT USE OF INSULIN: Chronic | ICD-10-CM

## 2023-07-24 DIAGNOSIS — M25.542 JOINT PAIN IN FINGERS OF BOTH HANDS: ICD-10-CM

## 2023-07-24 DIAGNOSIS — E66.01 CLASS 2 SEVERE OBESITY DUE TO EXCESS CALORIES WITH SERIOUS COMORBIDITY AND BODY MASS INDEX (BMI) OF 37.0 TO 37.9 IN ADULT: Chronic | ICD-10-CM

## 2023-07-24 DIAGNOSIS — Z23 NEED FOR VACCINATION: Primary | ICD-10-CM

## 2023-07-24 DIAGNOSIS — I10 PRIMARY HYPERTENSION: Chronic | ICD-10-CM

## 2023-07-24 DIAGNOSIS — M25.541 JOINT PAIN IN FINGERS OF BOTH HANDS: ICD-10-CM

## 2023-07-24 LAB — HBA1C MFR BLD: 6.9 %

## 2023-07-24 PROCEDURE — 3078F PR MOST RECENT DIASTOLIC BLOOD PRESSURE < 80 MM HG: ICD-10-PCS | Mod: CPTII,,, | Performed by: NURSE PRACTITIONER

## 2023-07-24 PROCEDURE — 90677 PCV20 VACCINE IM: CPT | Mod: PBBFAC

## 2023-07-24 PROCEDURE — 3044F PR MOST RECENT HEMOGLOBIN A1C LEVEL <7.0%: ICD-10-PCS | Mod: CPTII,,, | Performed by: NURSE PRACTITIONER

## 2023-07-24 PROCEDURE — 3075F SYST BP GE 130 - 139MM HG: CPT | Mod: CPTII,,, | Performed by: NURSE PRACTITIONER

## 2023-07-24 PROCEDURE — 1160F PR REVIEW ALL MEDS BY PRESCRIBER/CLIN PHARMACIST DOCUMENTED: ICD-10-PCS | Mod: CPTII,,, | Performed by: NURSE PRACTITIONER

## 2023-07-24 PROCEDURE — 1159F PR MEDICATION LIST DOCUMENTED IN MEDICAL RECORD: ICD-10-PCS | Mod: CPTII,,, | Performed by: NURSE PRACTITIONER

## 2023-07-24 PROCEDURE — 99214 OFFICE O/P EST MOD 30 MIN: CPT | Mod: 25,S$PBB,, | Performed by: NURSE PRACTITIONER

## 2023-07-24 PROCEDURE — 83036 HEMOGLOBIN GLYCOSYLATED A1C: CPT | Mod: PBBFAC | Performed by: NURSE PRACTITIONER

## 2023-07-24 PROCEDURE — 3061F PR NEG MICROALBUMINURIA RESULT DOCUMENTED/REVIEW: ICD-10-PCS | Mod: CPTII,,, | Performed by: NURSE PRACTITIONER

## 2023-07-24 PROCEDURE — 3075F PR MOST RECENT SYSTOLIC BLOOD PRESS GE 130-139MM HG: ICD-10-PCS | Mod: CPTII,,, | Performed by: NURSE PRACTITIONER

## 2023-07-24 PROCEDURE — 1159F MED LIST DOCD IN RCRD: CPT | Mod: CPTII,,, | Performed by: NURSE PRACTITIONER

## 2023-07-24 PROCEDURE — 3008F BODY MASS INDEX DOCD: CPT | Mod: CPTII,,, | Performed by: NURSE PRACTITIONER

## 2023-07-24 PROCEDURE — 3044F HG A1C LEVEL LT 7.0%: CPT | Mod: CPTII,,, | Performed by: NURSE PRACTITIONER

## 2023-07-24 PROCEDURE — 3078F DIAST BP <80 MM HG: CPT | Mod: CPTII,,, | Performed by: NURSE PRACTITIONER

## 2023-07-24 PROCEDURE — 3061F NEG MICROALBUMINURIA REV: CPT | Mod: CPTII,,, | Performed by: NURSE PRACTITIONER

## 2023-07-24 PROCEDURE — 3066F PR DOCUMENTATION OF TREATMENT FOR NEPHROPATHY: ICD-10-PCS | Mod: CPTII,,, | Performed by: NURSE PRACTITIONER

## 2023-07-24 PROCEDURE — 1160F RVW MEDS BY RX/DR IN RCRD: CPT | Mod: CPTII,,, | Performed by: NURSE PRACTITIONER

## 2023-07-24 PROCEDURE — 3008F PR BODY MASS INDEX (BMI) DOCUMENTED: ICD-10-PCS | Mod: CPTII,,, | Performed by: NURSE PRACTITIONER

## 2023-07-24 PROCEDURE — 99214 OFFICE O/P EST MOD 30 MIN: CPT | Mod: PBBFAC | Performed by: NURSE PRACTITIONER

## 2023-07-24 PROCEDURE — 99214 PR OFFICE/OUTPT VISIT, EST, LEVL IV, 30-39 MIN: ICD-10-PCS | Mod: 25,S$PBB,, | Performed by: NURSE PRACTITIONER

## 2023-07-24 PROCEDURE — 3066F NEPHROPATHY DOC TX: CPT | Mod: CPTII,,, | Performed by: NURSE PRACTITIONER

## 2023-07-24 RX ORDER — TIZANIDINE 4 MG/1
4 TABLET ORAL EVERY 8 HOURS
Qty: 30 TABLET | Refills: 1 | Status: SHIPPED | OUTPATIENT
Start: 2023-07-24 | End: 2023-08-23 | Stop reason: SDUPTHER

## 2023-07-24 RX ORDER — DICLOFENAC SODIUM 75 MG/1
75 TABLET, DELAYED RELEASE ORAL 2 TIMES DAILY PRN
Qty: 45 TABLET | Refills: 1 | Status: SHIPPED | OUTPATIENT
Start: 2023-07-24 | End: 2024-01-08 | Stop reason: SDUPTHER

## 2023-07-24 RX ORDER — METFORMIN HYDROCHLORIDE 500 MG/1
500 TABLET, EXTENDED RELEASE ORAL
Qty: 90 TABLET | Refills: 3 | Status: SHIPPED | OUTPATIENT
Start: 2023-07-24 | End: 2024-07-23

## 2023-07-24 NOTE — PROGRESS NOTES
Brianna Srinivasan, MARIYA   [facility]  [address] [address]      PATIENT NAME: Joyce Marie Boast  : 1970  DATE: 23  MRN: [id number]      Reason for Visit / Chief Complaint: Establish Care (Wants Pn vaccine, wants Hep panel )       History of Present Illness / Problem Focused Workflow     Joyce Marie Boast is a 53 y.o. Black or  female presents to the clinic for HTN and DM f/u. PMH HTN, constipation, AUB, DM, DANA on CPAP, liposuction of abd (3/28/22) and back (22), bilateral CTR and obesity. Followed by [facility] and [facility].     Today, pt continues to report bilateral aching, throbbing finger pain, onset several months, with associated swelling, weakness and decreased . Reports med compliance. Does not follow ADA/low sodium diet or exercise routinely.  Reports somewhat compliant with CPAP nightly. Records requested from [facility]; states had appt 2 weeks ago. Amendable to pneumo vaccine today. Denies chest pain, shortness of breath, cough, fever, headache, dizziness, weakness, abdominal pain, nausea, vomiting, diarrhea, constipation, dysuria, depression, anxiety, SI/HI.      Review of Systems     Review of Systems     See HPI for details    Constitutional: Denies Change in appetite. Denies Chills. Denies Fever. Denies Night sweats.  Eye: Denies Blurred vision. Denies Discharge. Denies Eye pain.  ENT: Denies Decreased hearing. Denies Sore throat. Denies Swollen glands.  Respiratory: Denies Cough. Denies Shortness of breath. Denies Shortness of breath with exertion. Denies Wheezing.  Cardiovascular: Denies Chest pain at rest. Denies Chest pain with exertion. Denies Irregular heartbeat. Denies Palpitations. Denies Edema.  Gastrointestinal: Denies Abdominal pain. Denies Diarrhea. Denies Nausea. Denies Vomiting. Denies Hematemesis or Hematochezia.  Genitourinary: Denies Dysuria.  Denies Urinary frequency. Denies Urinary urgency. Denies Blood in urine.  Endocrine: Denies Cold intolerance. Denies Excessive thirst. Denies Heat intolerance. Denies Weight loss. Denies Weight gain.  Musculoskeletal: Admits Painful joints. Denies Weakness.  Integumentary: Denies Rash. Denies Itching. Denies Dry skin.  Neurologic: Denies Dizziness. Denies Fainting. Denies Headache.  Psychiatric: Denies Depression. Denies Anxiety. Denies Suicidal/Homicidal ideations.    All Other ROS: Negative except as stated in HPI.     Medical / Surgical / Social / Family History       ----------------------------  Diabetes mellitus, type 2  Hypertension     Past Surgical History:   Procedure Laterality Date    CARPAL TUNNEL RELEASE Bilateral     TUBAL LIGATION         Social History     Socioeconomic History    Marital status: Single   Tobacco Use    Smoking status: Never     Passive exposure: Never    Smokeless tobacco: Never   Substance and Sexual Activity    Alcohol use: Not Currently     Comment: wine on occ    Drug use: Never    Sexual activity: Yes     Birth control/protection: See Surgical Hx     Social Determinants of Health     Transportation Needs: No Transportation Needs    Lack of Transportation (Medical): No    Lack of Transportation (Non-Medical): No   Physical Activity: Inactive    Days of Exercise per Week: 0 days    Minutes of Exercise per Session: 0 min   Social Connections: Moderately Isolated    Frequency of Communication with Friends and Family: More than three times a week    Frequency of Social Gatherings with Friends and Family: Three times a week    Attends Adventist Services: More than 4 times per year    Active Member of Clubs or Organizations: No    Attends Club or Organization Meetings: Never    Marital Status:    Housing Stability: Low Risk     Unable to Pay for Housing in the Last Year: No    Number of Places Lived in the Last Year: 1    Unstable Housing in the Last Year: No        Family  "History   Problem Relation Age of Onset    Cancer Maternal Grandmother         Medications and Allergies     Medications  Current Outpatient Medications   Medication Instructions    amLODIPine (NORVASC) 5 mg, Oral, Nightly    aspirin (ECOTRIN) 81 mg, Oral    blood sugar diagnostic Strp   Glucometer Strips, See Instructions, Use to check CBG daily and prn., # 50 EA, 11 Refill(s), Pharmacy: [facility] #[id number], 170, cm, Height/Length Dosing, 01/11/22 8:27:00 CST, 117.7, kg, Weight Dosing, 01/11/22 8:27:00 CST    blood-glucose meter kit   Glucometer, See Instructions, Use to check CBG daily and prn. Dx: diabetes, # 1 EA, 0 Refill(s), Pharmacy: [facility] #[id number], 170, cm, Height/Length Dosing, 01/11/22 8:27:00 CST, 117.7, kg, Weight Dosing, 01/11/22 8:27:00 CST    diclofenac (VOLTAREN) 75 mg, Oral, 2 times daily PRN    lancets OP (IHEALTH) OP   Glucometer Lancets, See Instructions, Use to CBG daily and prn., # 50 EA, 11 Refill(s), Pharmacy: [facility] #[id number], 170, cm, Height/Length Dosing, 01/11/22 8:27:00 CST, 117.7, kg, Weight Dosing, 01/11/22 8:27:00 CST    linaCLOtide (LINZESS) 290 mcg Cap capsule TAKE 1 CAPSULE BY MOUTH DAILY AS NEEDED FOR CONSTIPATION TAKE 30 MINUTES BEFORE BREAKFAST, ON AN ON AN EMPTY STOMACH    pravastatin (PRAVACHOL) 20 mg, Oral, Nightly    tiZANidine (ZANAFLEX) 4 mg, Oral, Every 8 hours, May cause drowsiness         Allergies  Review of patient's allergies indicates:  No Known Allergies    Physical Examination     /77 (BP Location: Right arm, Patient Position: Sitting, BP Method: Large (Automatic))   Pulse 74   Temp 98.2 °F (36.8 °C) (Oral)   Resp 18   Ht 5' 7.01" (1.702 m)   Wt 109 kg (240 lb 3.2 oz)   LMP 04/01/2022 (Approximate)   BMI 37.61 kg/m²     Physical Exam  Constitutional:       Appearance: She is obese.   Musculoskeletal:      Right hand: Swelling, deformity and tenderness present. Decreased range of motion. Decreased strength.      Left " hand: Swelling, deformity and tenderness present. Decreased range of motion. Decreased strength.       General: Alert and oriented, No acute distress.  Head: Normocephalic, Atraumatic.  Eye: Pupils are equal, round and reactive to light, Extraocular movements are intact, Sclera non-icteric.  Ears/Nose/Throat: Normal, Mucosa moist,Clear.  Neck/Thyroid: Supple, Non-tender, No carotid bruit, No lymphadenopathy, No JVD, Full range of motion.  Respiratory: Clear to auscultation bilaterally; No wheezes, rales or rhonchi,Non-labored respirations, Symmetrical chest wall expansion.  Cardiovascular: Regular rate and rhythm, S1/S2 normal, No murmurs, rubs or gallops.  Gastrointestinal: Soft, Non-tender, Non-distended, Normal bowel sounds, No palpable organomegaly.  Integumentary: Warm, Dry, Intact, No suspicious lesions or rashes.  Extremities: No clubbing, cyanosis or edema  Neurologic: No focal deficits, Cranial Nerves II-XII are grossly intact, Motor strength normal upper and lower extremities, Sensory exam intact.  Psychiatric: Normal interaction, Coherent speech, Euthymic mood, Appropriate affect     Protective Sensation (w/ 10 gram monofilament):  Right: Intact  Left: Intact    Visual Inspection:  Normal -  Bilateral    Pedal Pulses:   Right: Present  Left: Present    Posterior Tibialis Pulses:   Right:Present  Left: Present      Results     Lab Results   Component Value Date    WBC 8.0 01/23/2023    HGB 14.7 01/23/2023    HCT 45.5 01/23/2023     01/23/2023    CHOL 214 (H) 01/23/2023    TRIG 126 01/23/2023    ALT 17 06/14/2023    AST 21 06/14/2023     06/14/2023    K 3.9 06/14/2023    CREATININE 0.85 06/14/2023    BUN 10.1 06/14/2023    CO2 25 06/14/2023    TSH 2.214 01/23/2023    INR 1.02 04/10/2020    HGBA1C 6.2 01/23/2023     Details    Reading Physician Reading Date Result Priority   Sandeep Villagomez MD  [phone] 6/14/2023 Routine     Narrative & Impression  EXAMINATION:  XR HAND PA 1 VIEW  BILATERAL     CLINICAL HISTORY:  Pain in joints of right hand     COMPARISON:  None.     FINDINGS:  No acute displaced fractures or dislocations.     There is some narrowing of the interphalangeal joints specially of the 5th digit with degenerative changes of the 1st carpometacarpal joint articular spaces otherwise preserved with smooth articular surfaces     No blastic or lytic lesions.     Soft tissues within normal limits.     Impression:     Degenerative changes.        Electronically signed by: Sandeep Villagomez  Date:                                            06/14/2023  Time:                                           09:28    Assessment and Plan (including Health Maintenance)     Plan:     1. 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 Previous A1C 6.2.   Continue metformin as prescribed.  Follow ADA diet.  Avoid soda, simple sweets, and limit rice/pasta/bread/starches and consume brown options when possible.   Maintain healthy weight with BMI goal <30.   Perform aerobic exercise for 150 minutes per week (or 5 days a week for 30 minutes each day).   Examine feet daily.   Obtain annual dilated eye exam.  Eye exam: 7/2023 at [facility] (records requested)  Foot exam: 7/24/23    - POCT HEMOGLOBIN A1C    2. Primary hypertension  At goal.  Continue amlodipine.  Follow a low sodium (less than 2 grams of sodium per day), DASH diet.   Continue medications as prescribed.  Monitor blood pressure and report any consistent values greater than 140/90 and keep a log.  Maintain healthy weight with a BMI goal of <30.   Aerobic exercise for 150 minutes per week (or 5 days a week for 30 minutes each day).      3. Mild nonproliferative diabetic retinopathy of left eye without macular edema associated with type 2 diabetes mellitus  Records requested from Opth[facility].  Achieve/maintain adequate  glucose control.    4. Class 2 severe obesity due to excess calories with serious comorbidity and body mass index (BMI) of 37.0 to 37.9 in adult  BMI 37. Goal BMI <30.  Aerobic exercise 150 minutes per week.  Avoid soda, simple sugars, sweets, excessive rice, pasta, potatoes or bread.   Choose brown options when available and portion control.  Limit fast foods and fried foods.   Choose complex carbs in moderation (ex: green, leafy vegetables, beans, oatmeal).  Eat plenty of fresh fruits and vegetables with lean meats daily.   Consider permanent healthy lifestyle changes.      5. Chronic idiopathic constipation  Refilled linzess; effective.  Educated on high fiber diet and exercise.  Drink at least 64 ozs of water daily.  Eat hot breakfast q am.      6. Obstructive sleep apnea syndrome  Reports compliance with wearing CPAP nightly.  Reports decreased EDS, snoring and fatigue.  Pt is benefiting from its use.  Expect lifetime use and decreased daytime sleepiness/fatigue.   Avoid excessive alcohol, smoking and overuse of sedatives at bedtime.  Weight management discussed.  Adjust sleep position as needed for increased comfort.      7. Need for vaccination  - Pneumococcal Conjugate Vaccine (20 Valent) (IM)    8. Pain to bilateral hands  Autoimmune testing ordered.  Refilled tizanidine and diclofenac.     Problem List Items Addressed This Visit          Ophtho    Mild nonproliferative diabetic retinopathy of left eye without macular edema associated with type 2 diabetes mellitus (Chronic)       Cardiac/Vascular    Hypertension (Chronic)    Relevant Orders    CBC Auto Differential    Comprehensive Metabolic Panel    Lipid Panel    Microalbumin/Creatinine Ratio, Urine    TSH    Urinalysis, Reflex to Urine Culture       Endocrine    Diabetes mellitus (Chronic)    Relevant Orders    POCT HEMOGLOBIN A1C (Completed)    CBC Auto Differential    Comprehensive Metabolic Panel    Hemoglobin A1C    Lipid Panel     Microalbumin/Creatinine Ratio, Urine    Urinalysis, Reflex to Urine Culture    Class 2 severe obesity due to excess calories with serious comorbidity and body mass index (BMI) of 37.0 to 37.9 in adult (Chronic)    Relevant Orders    Lipid Panel    TSH       GI    Constipation       Orthopedic    Joint pain in fingers of both hands    Relevant Orders    CYCLIC CITRULLINATED PEPTIDE (CCP) ANTIBODY    Sedimentation rate    C-Reactive Protein    Uric Acid    Antinuclear Antibody (TARA), HEp-2, IgG    Rheumatoid Factors, IgA, IgG, IgM       Other    Obstructive sleep apnea syndrome (Chronic)     Other Visit Diagnoses       Need for vaccination    -  Primary    Relevant Orders    Pneumococcal Conjugate Vaccine (20 Valent) (IM) (Completed)              Health Maintenance Due   Topic Date Due    Foot Exam  Never done    COVID-19 Vaccine (5 - Pfizer series) 07/12/2022    Eye Exam  06/01/2023       Follow up in about 4 weeks (around 8/21/2023) for Virtual Visit for lab results.        Signature:  MARIYA Gonzalez  [facility]  [address] [address]

## 2023-07-28 ENCOUNTER — LAB VISIT (OUTPATIENT)
Dept: LAB | Facility: HOSPITAL | Age: 53
End: 2023-07-28
Attending: NURSE PRACTITIONER
Payer: COMMERCIAL

## 2023-07-28 ENCOUNTER — DOCUMENTATION ONLY (OUTPATIENT)
Dept: INTERNAL MEDICINE | Facility: CLINIC | Age: 53
End: 2023-07-28
Payer: COMMERCIAL

## 2023-07-28 DIAGNOSIS — M25.542 JOINT PAIN IN FINGERS OF BOTH HANDS: ICD-10-CM

## 2023-07-28 DIAGNOSIS — M25.541 JOINT PAIN IN FINGERS OF BOTH HANDS: ICD-10-CM

## 2023-07-28 LAB
CRP SERPL-MCNC: 19.3 MG/L
ERYTHROCYTE [SEDIMENTATION RATE] IN BLOOD: 20 MM/HR (ref 0–20)
URATE SERPL-MCNC: 7.7 MG/DL (ref 2.6–6)

## 2023-07-28 PROCEDURE — 85652 RBC SED RATE AUTOMATED: CPT

## 2023-07-28 PROCEDURE — 83516 IMMUNOASSAY NONANTIBODY: CPT

## 2023-07-28 PROCEDURE — 86038 ANTINUCLEAR ANTIBODIES: CPT

## 2023-07-28 PROCEDURE — 84550 ASSAY OF BLOOD/URIC ACID: CPT

## 2023-07-28 PROCEDURE — 86140 C-REACTIVE PROTEIN: CPT

## 2023-07-28 PROCEDURE — 86003 ALLG SPEC IGE CRUDE XTRC EA: CPT

## 2023-07-28 PROCEDURE — 86039 ANTINUCLEAR ANTIBODIES (ANA): CPT

## 2023-07-28 PROCEDURE — 36415 COLL VENOUS BLD VENIPUNCTURE: CPT

## 2023-07-30 LAB
AR ANA INTERPRETIVE COMMENT: ABNORMAL
AR ANA PATTERN: ABNORMAL
AR ANA TITER: ABNORMAL
AR ANTINUCLEAR ANTIBODY (ANA), HEP-2, IGG: DETECTED
CYCLIC CITRULLINATED PEPTIDE (CCP) (OHS): 0.6 U/ML

## 2023-07-31 LAB
RF IGA SER-ACNC: <5 UNITS
RF IGG SER-ACNC: <5 UNITS
RF IGM SER-ACNC: <5 UNITS

## 2023-08-23 ENCOUNTER — OFFICE VISIT (OUTPATIENT)
Dept: INTERNAL MEDICINE | Facility: CLINIC | Age: 53
End: 2023-08-23
Payer: COMMERCIAL

## 2023-08-23 DIAGNOSIS — R76.8 POSITIVE ANA (ANTINUCLEAR ANTIBODY): Chronic | ICD-10-CM

## 2023-08-23 DIAGNOSIS — M25.542 JOINT PAIN IN FINGERS OF BOTH HANDS: Primary | ICD-10-CM

## 2023-08-23 DIAGNOSIS — M25.541 JOINT PAIN IN FINGERS OF BOTH HANDS: Primary | ICD-10-CM

## 2023-08-23 DIAGNOSIS — E11.3292 TYPE 2 DIABETES MELLITUS WITH LEFT EYE AFFECTED BY MILD NONPROLIFERATIVE RETINOPATHY WITHOUT MACULAR EDEMA, WITHOUT LONG-TERM CURRENT USE OF INSULIN: Chronic | ICD-10-CM

## 2023-08-23 PROCEDURE — 1160F RVW MEDS BY RX/DR IN RCRD: CPT | Mod: CPTII,95,, | Performed by: NURSE PRACTITIONER

## 2023-08-23 PROCEDURE — 3066F PR DOCUMENTATION OF TREATMENT FOR NEPHROPATHY: ICD-10-PCS | Mod: CPTII,95,, | Performed by: NURSE PRACTITIONER

## 2023-08-23 PROCEDURE — 3044F HG A1C LEVEL LT 7.0%: CPT | Mod: CPTII,95,, | Performed by: NURSE PRACTITIONER

## 2023-08-23 PROCEDURE — 3066F NEPHROPATHY DOC TX: CPT | Mod: CPTII,95,, | Performed by: NURSE PRACTITIONER

## 2023-08-23 PROCEDURE — 3044F PR MOST RECENT HEMOGLOBIN A1C LEVEL <7.0%: ICD-10-PCS | Mod: CPTII,95,, | Performed by: NURSE PRACTITIONER

## 2023-08-23 PROCEDURE — 2023F PR DILATED RETINAL EXAM W/O EVID OF RETINOPATHY: ICD-10-PCS | Mod: CPTII,95,, | Performed by: NURSE PRACTITIONER

## 2023-08-23 PROCEDURE — 1159F PR MEDICATION LIST DOCUMENTED IN MEDICAL RECORD: ICD-10-PCS | Mod: CPTII,95,, | Performed by: NURSE PRACTITIONER

## 2023-08-23 PROCEDURE — 3061F NEG MICROALBUMINURIA REV: CPT | Mod: CPTII,95,, | Performed by: NURSE PRACTITIONER

## 2023-08-23 PROCEDURE — 1159F MED LIST DOCD IN RCRD: CPT | Mod: CPTII,95,, | Performed by: NURSE PRACTITIONER

## 2023-08-23 PROCEDURE — 3061F PR NEG MICROALBUMINURIA RESULT DOCUMENTED/REVIEW: ICD-10-PCS | Mod: CPTII,95,, | Performed by: NURSE PRACTITIONER

## 2023-08-23 PROCEDURE — 99214 OFFICE O/P EST MOD 30 MIN: CPT | Mod: 95,,, | Performed by: NURSE PRACTITIONER

## 2023-08-23 PROCEDURE — 2023F DILAT RTA XM W/O RTNOPTHY: CPT | Mod: CPTII,95,, | Performed by: NURSE PRACTITIONER

## 2023-08-23 PROCEDURE — 1160F PR REVIEW ALL MEDS BY PRESCRIBER/CLIN PHARMACIST DOCUMENTED: ICD-10-PCS | Mod: CPTII,95,, | Performed by: NURSE PRACTITIONER

## 2023-08-23 PROCEDURE — 99214 PR OFFICE/OUTPT VISIT, EST, LEVL IV, 30-39 MIN: ICD-10-PCS | Mod: 95,,, | Performed by: NURSE PRACTITIONER

## 2023-08-23 RX ORDER — PREDNISONE 10 MG/1
TABLET ORAL
Qty: 41 TABLET | Refills: 0 | Status: SHIPPED | OUTPATIENT
Start: 2023-08-23 | End: 2023-11-20 | Stop reason: SDUPTHER

## 2023-08-23 RX ORDER — TIZANIDINE 4 MG/1
4 TABLET ORAL EVERY 8 HOURS PRN
Qty: 30 TABLET | Refills: 3 | Status: SHIPPED | OUTPATIENT
Start: 2023-08-23 | End: 2024-01-08 | Stop reason: SDUPTHER

## 2023-08-23 NOTE — PROGRESS NOTES
Audio Only Telehealth Visit     The patient location is: at work  The chief complaint leading to consultation is: bilateral hand pain and lab results  Visit type: Virtual visit with audio only (telephone)  Total time spent with patient: 17 mins     The reason for the audio only service rather than synchronous audio and video virtual visit was related to technical difficulties with connecting to audiovisual application.     Each patient to whom I provide medical services by telemedicine is:  (1) informed of the relationship between the physician and patient and the respective role of any other health care provider with respect to management of the patient; and (2) notified that they may decline to receive medical services by telemedicine and may withdraw from such care at any time. Patient verbally consented to receive this service via voice-only telephone call.    Billing Provider: MARIYA Gonzalez  Level of Service: FL OFFICE/OUTPT VISIT, EST, LEVL IV, 30-39 MIN  Patient PCP Information       Provider PCP Type    MARIYA Gonzalez General            Reason for Visit / Chief Complaint: Hand Pain (Audio only, can Not remember password; Lab results)       History of Present Illness / Problem Focused Workflow     Joyce Marie Boast is a 53 y.o. Black or  female for f/u bilateral hand pain. PMH HTN, constipation, AUB, DM, DANA on CPAP, liposuction of abd (3/28/22) and back (4/7/22), bilateral CTR and obesity. Followed by [facility] clinic and [facility].     Today, pt continues to report bilateral hand swelling and achiness, 9/10, today. Symptoms unimproved with NSAIDs and muscle relaxers. Lab results reviewed with pt; questions answered. Denies chest pain, shortness of breath, cough, fever, headache, dizziness, abdominal pain, nausea, vomiting, diarrhea, constipation, dysuria, depression, anxiety, SI/HI.      Review of Systems     Review of Systems     See HPI for  details    Constitutional: Denies Change in appetite. Denies Chills. Denies Fever. Denies Night sweats.  Eye: Denies Blurred vision. Denies Discharge. Denies Eye pain.  ENT: Denies Decreased hearing. Denies Sore throat. Denies Swollen glands.  Respiratory: Denies Cough. Denies Shortness of breath. Denies Shortness of breath with exertion. Denies Wheezing.  Cardiovascular: Denies Chest pain at rest. Denies Chest pain with exertion. Denies Irregular heartbeat. Denies Palpitations. Denies Edema.  Gastrointestinal: Denies Abdominal pain. Denies Diarrhea. Denies Nausea. Denies Vomiting. Denies Hematemesis or Hematochezia.  Genitourinary: Denies Dysuria. Denies Urinary frequency. Denies Urinary urgency. Denies Blood in urine.  Endocrine: Denies Cold intolerance. Denies Excessive thirst. Denies Heat intolerance. Denies Weight loss. Denies Weight gain.  Musculoskeletal: Denies Painful joints. Denies Weakness to bilateral hands.  Integumentary: Denies Rash. Denies Itching. Denies Dry skin.  Neurologic: Denies Dizziness. Denies Fainting. Denies Headache.  Psychiatric: Denies Depression. Denies Anxiety. Denies Suicidal/Homicidal ideations.    All Other ROS: Negative except as stated in HPI.     Medical / Social / Family History     ----------------------------  Diabetes mellitus, type 2  Hypertension     Past Surgical History:   Procedure Laterality Date    CARPAL TUNNEL RELEASE Bilateral     TUBAL LIGATION         Social History     Socioeconomic History    Marital status: Single   Tobacco Use    Smoking status: Never     Passive exposure: Never    Smokeless tobacco: Never   Substance and Sexual Activity    Alcohol use: Not Currently     Comment: wine on occ    Drug use: Never    Sexual activity: Yes     Birth control/protection: See Surgical Hx     Social Determinants of Health     Transportation Needs: No Transportation Needs (9/21/2022)    PRAPARE - Transportation     Lack of Transportation (Medical): No     Lack  of Transportation (Non-Medical): No   Physical Activity: Inactive (1/23/2023)    Exercise Vital Sign     Days of Exercise per Week: 0 days     Minutes of Exercise per Session: 0 min   Social Connections: Moderately Isolated (1/23/2023)    Social Connection and Isolation Panel [NHANES]     Frequency of Communication with Friends and Family: More than three times a week     Frequency of Social Gatherings with Friends and Family: Three times a week     Attends Baptism Services: More than 4 times per year     Active Member of Clubs or Organizations: No     Attends Club or Organization Meetings: Never     Marital Status:    Housing Stability: Low Risk  (9/21/2022)    Housing Stability Vital Sign     Unable to Pay for Housing in the Last Year: No     Number of Places Lived in the Last Year: 1     Unstable Housing in the Last Year: No        Family History   Problem Relation Age of Onset    Cancer Maternal Grandmother         Medications and Allergies     Medications  Current Outpatient Medications   Medication Instructions    amLODIPine (NORVASC) 5 mg, Oral, Nightly    aspirin (ECOTRIN) 81 mg, Oral    blood sugar diagnostic Strp Glucometer Strips, See Instructions, Use to check CBG daily and prn    blood-glucose meter kit   Glucometer, See Instructions, Use to check CBG daily and prn. Dx: diabetes, # 1 EA, 0 Refill(s), Pharmacy: [facility] #[id number], 170, cm, Height/Length Dosing, 01/11/22 8:27:00 CST, 117.7, kg, Weight Dosing, 01/11/22 8:27:00 CST    diclofenac (VOLTAREN) 75 mg, Oral, 2 times daily PRN    lancets 31 gauge Misc 1 lancet , Misc.(Non-Drug; Combo Route), Daily    lancets OP (IHEALTH) OP   Glucometer Lancets, See Instructions, Use to CBG daily and prn., # 50 EA, 11 Refill(s), Pharmacy: [facility] #[id number], 170, cm, Height/Length Dosing, 01/11/22 8:27:00 CST, 117.7, kg, Weight Dosing, 01/11/22 8:27:00 CST    linaCLOtide (LINZESS) 290 mcg Cap capsule TAKE 1 CAPSULE  BY MOUTH DAILY AS NEEDED FOR CONSTIPATION TAKE 30 MINUTES BEFORE BREAKFAST, ON AN ON AN EMPTY STOMACH    metFORMIN (GLUCOPHAGE-XR) 500 mg, Oral, With breakfast    pravastatin (PRAVACHOL) 20 mg, Oral, Nightly    predniSONE (DELTASONE) 10 MG tablet Take 2 tabs daily x 10 days, then 1.5 tabs x 7 days, then 1 tab x 7 days, then 0.5 tab x 7 days    tiZANidine (ZANAFLEX) 4 mg, Oral, Every 8 hours PRN, May cause drowsiness         Allergies  Review of patient's allergies indicates:  No Known Allergies    Physical Examination   Vital Signs  Pain Score:   8 (9)  Pain Loc: Hand (bilat)]    Physical Exam  Constitutional:       Appearance: She is obese.   Neurological:      Mental Status: She is alert and oriented to person, place, and time.   Psychiatric:         Mood and Affect: Mood normal.         Speech: Speech normal.         Behavior: Behavior normal. Behavior is cooperative.         Thought Content: Thought content normal.         Judgment: Judgment normal.       Results     Lab Results   Component Value Date    WBC 8.0 01/23/2023    HGB 14.7 01/23/2023    HCT 45.5 01/23/2023     01/23/2023    CHOL 214 (H) 01/23/2023    TRIG 126 01/23/2023    ALT 17 06/14/2023    AST 21 06/14/2023     06/14/2023    K 3.9 06/14/2023    CREATININE 0.85 06/14/2023    BUN 10.1 06/14/2023    CO2 25 06/14/2023    TSH 2.214 01/23/2023    INR 1.02 04/10/2020    HGBA1C 6.2 01/23/2023      Latest Reference Range & Units 07/28/23 06:39   CRP <5.00 mg/L 19.30 (H)   (H): Data is abnormally high   Latest Reference Range & Units 07/28/23 06:39   TARA Titer  1:160 !   TARA Interpretive Comment  See Note   TARA Pattern  Speckled !   Antinuclear Antibody (TARA), HEp-2, IgG <1:80  Detected (H)   Rheumatoid Factor, IgA by SURAJ <=6 Units <5   Rheumatoid Factor, IgG by SURAJ <=6 Units <5   Rheumatoid Factor, IgM by SURAJ <=6 Units <5   !: Data is abnormal  (H): Data is abnormally high    Assessment and Plan (including Health Maintenance)      Plan:     1. Positive TARA (antinuclear antibody)  TARA positive with speckled pattern; titer 1:160.  Refer to rheumatology to eval/treat.  Encouraged to keep appt when scheduled.  Rx prednisone taper.  Refilled tizanidine.      2. Joint pain in fingers of both hands  TARA positive with speckled pattern; titer 1:160.  Refer to rheumatology to eval/treat.  Encouraged to keep appt when scheduled.  Rx prednisone taper.  Refilled tizanidine.      Problem List Items Addressed This Visit          Immunology/Multi System    Positive TARA (antinuclear antibody) (Chronic)    Relevant Orders    Ambulatory referral/consult to Rheumatology       Endocrine    Diabetes mellitus (Chronic)    Relevant Medications    blood sugar diagnostic Strp    lancets 31 gauge Misc       Orthopedic    Joint pain in fingers of both hands - Primary    Relevant Orders    Ambulatory referral/consult to Rheumatology         Health Maintenance Due   Topic Date Due    COVID-19 Vaccine (5 - Pfizer series) 07/12/2022       Follow up in about 4 months (around 12/23/2023) for Lab Results, Follow up.        Signature:  MARIYA Gonzalez  [facility]  [address] [address]      Date of encounter: 8/23/23              This service was not originating from a related E/M service provided within the previous 7 days nor will  to an E/M service or procedure within the next 24 hours or my soonest available appointment.  Prevailing standard of care was able to be met in this audio-only visit.

## 2023-10-02 ENCOUNTER — OFFICE VISIT (OUTPATIENT)
Dept: GYNECOLOGY | Facility: CLINIC | Age: 53
End: 2023-10-02
Payer: COMMERCIAL

## 2023-10-02 VITALS
BODY MASS INDEX: 36.79 KG/M2 | OXYGEN SATURATION: 100 % | SYSTOLIC BLOOD PRESSURE: 144 MMHG | WEIGHT: 234.38 LBS | DIASTOLIC BLOOD PRESSURE: 90 MMHG | HEIGHT: 67 IN | HEART RATE: 80 BPM | RESPIRATION RATE: 20 BRPM | TEMPERATURE: 98 F

## 2023-10-02 DIAGNOSIS — R10.2 PELVIC PRESSURE IN FEMALE: ICD-10-CM

## 2023-10-02 DIAGNOSIS — Z01.419 ENCOUNTER FOR ANNUAL ROUTINE GYNECOLOGICAL EXAMINATION: Primary | ICD-10-CM

## 2023-10-02 DIAGNOSIS — Z78.0 MENOPAUSE: ICD-10-CM

## 2023-10-02 LAB
APPEARANCE UR: CLEAR
BACTERIA #/AREA URNS AUTO: ABNORMAL /HPF
BILIRUB UR QL STRIP.AUTO: NEGATIVE
BILIRUB UR QL STRIP: NEGATIVE
COLOR UR AUTO: COLORLESS
GLUCOSE UR QL STRIP.AUTO: NORMAL
GLUCOSE UR QL STRIP: NEGATIVE
HYALINE CASTS #/AREA URNS LPF: ABNORMAL /LPF
KETONES UR QL STRIP.AUTO: NEGATIVE
KETONES UR QL STRIP: NEGATIVE
LEUKOCYTE ESTERASE UR QL STRIP.AUTO: NEGATIVE
LEUKOCYTE ESTERASE UR QL STRIP: NEGATIVE
NITRITE UR QL STRIP.AUTO: NEGATIVE
PH UR STRIP.AUTO: 5.5 [PH]
PH, POC UA: 6
POC BLOOD, URINE: POSITIVE
POC NITRATES, URINE: NEGATIVE
PROT UR QL STRIP.AUTO: NEGATIVE
PROT UR QL STRIP: NEGATIVE
RBC #/AREA URNS AUTO: ABNORMAL /HPF
RBC UR QL AUTO: NEGATIVE
SP GR UR STRIP.AUTO: 1.01 (ref 1–1.03)
SP GR UR STRIP: 1 (ref 1–1.03)
SQUAMOUS #/AREA URNS LPF: ABNORMAL /HPF
UROBILINOGEN UR STRIP-ACNC: 0.2 (ref 0.1–1.1)
UROBILINOGEN UR STRIP-ACNC: NORMAL
WBC #/AREA URNS AUTO: ABNORMAL /HPF

## 2023-10-02 PROCEDURE — 3066F NEPHROPATHY DOC TX: CPT | Mod: CPTII,,, | Performed by: NURSE PRACTITIONER

## 2023-10-02 PROCEDURE — 1159F PR MEDICATION LIST DOCUMENTED IN MEDICAL RECORD: ICD-10-PCS | Mod: CPTII,,, | Performed by: NURSE PRACTITIONER

## 2023-10-02 PROCEDURE — 3077F PR MOST RECENT SYSTOLIC BLOOD PRESSURE >= 140 MM HG: ICD-10-PCS | Mod: CPTII,,, | Performed by: NURSE PRACTITIONER

## 2023-10-02 PROCEDURE — 3061F PR NEG MICROALBUMINURIA RESULT DOCUMENTED/REVIEW: ICD-10-PCS | Mod: CPTII,,, | Performed by: NURSE PRACTITIONER

## 2023-10-02 PROCEDURE — 99396 PR PREVENTIVE VISIT,EST,40-64: ICD-10-PCS | Mod: S$PBB,,, | Performed by: NURSE PRACTITIONER

## 2023-10-02 PROCEDURE — 81001 URINALYSIS AUTO W/SCOPE: CPT | Performed by: NURSE PRACTITIONER

## 2023-10-02 PROCEDURE — 3044F HG A1C LEVEL LT 7.0%: CPT | Mod: CPTII,,, | Performed by: NURSE PRACTITIONER

## 2023-10-02 PROCEDURE — 3008F BODY MASS INDEX DOCD: CPT | Mod: CPTII,,, | Performed by: NURSE PRACTITIONER

## 2023-10-02 PROCEDURE — 3044F PR MOST RECENT HEMOGLOBIN A1C LEVEL <7.0%: ICD-10-PCS | Mod: CPTII,,, | Performed by: NURSE PRACTITIONER

## 2023-10-02 PROCEDURE — 99214 OFFICE O/P EST MOD 30 MIN: CPT | Mod: PBBFAC | Performed by: NURSE PRACTITIONER

## 2023-10-02 PROCEDURE — 3080F DIAST BP >= 90 MM HG: CPT | Mod: CPTII,,, | Performed by: NURSE PRACTITIONER

## 2023-10-02 PROCEDURE — 3061F NEG MICROALBUMINURIA REV: CPT | Mod: CPTII,,, | Performed by: NURSE PRACTITIONER

## 2023-10-02 PROCEDURE — 99396 PREV VISIT EST AGE 40-64: CPT | Mod: S$PBB,,, | Performed by: NURSE PRACTITIONER

## 2023-10-02 PROCEDURE — 3077F SYST BP >= 140 MM HG: CPT | Mod: CPTII,,, | Performed by: NURSE PRACTITIONER

## 2023-10-02 PROCEDURE — 3080F PR MOST RECENT DIASTOLIC BLOOD PRESSURE >= 90 MM HG: ICD-10-PCS | Mod: CPTII,,, | Performed by: NURSE PRACTITIONER

## 2023-10-02 PROCEDURE — 3066F PR DOCUMENTATION OF TREATMENT FOR NEPHROPATHY: ICD-10-PCS | Mod: CPTII,,, | Performed by: NURSE PRACTITIONER

## 2023-10-02 PROCEDURE — 81003 URINALYSIS AUTO W/O SCOPE: CPT | Mod: 59,PBBFAC | Performed by: NURSE PRACTITIONER

## 2023-10-02 PROCEDURE — 3008F PR BODY MASS INDEX (BMI) DOCUMENTED: ICD-10-PCS | Mod: CPTII,,, | Performed by: NURSE PRACTITIONER

## 2023-10-02 PROCEDURE — 1159F MED LIST DOCD IN RCRD: CPT | Mod: CPTII,,, | Performed by: NURSE PRACTITIONER

## 2023-10-02 NOTE — PROGRESS NOTES
"  Subjective:       Patient ID: Joyce Marie Boast is a 53 y.o. female.    Chief Complaint:  Gynecologic Exam      History of Present Illness  The patient  here for annual exam. LMP was 2023. Pt states prior to this time, last cycle was 2022. Pt may be postmenopausal and experiencing PMB vs perimenopause.  Hx of  AUB-O with GYN in . Pt was placed on norethindrone 5 mg however only took for 3 years then stopped since she was not bleeding. Denies history of abnormal paps. Last pap -NIL and HPV neg.  MG-23 & BIRADS 1. Denies breast or urinary complaints. Denies pelvic pain, abnormal bleeding or discharge. Pt does c/o pelvic pressure 2 months ago, however not today. Pt reports no STIs in the past, recent STI screening was negative. Contraception consist of TL. Denies tobacco use. Dep. screening 0. Denies fly hx of breast, uterine or colon cancer. MGM with ovarian cancer.    GYN & OB History  Patient's last menstrual period was 2022 (approximate).   Date of Last Pap: 2021    Review of patient's allergies indicates:  No Known Allergies  Past Medical History:   Diagnosis Date    Diabetes mellitus, type 2     Hyperlipidemia     Hypertension      OB History    Para Term  AB Living   2 2           SAB IAB Ectopic Multiple Live Births                  # Outcome Date GA Lbr Akira/2nd Weight Sex Delivery Anes PTL Lv   2 Para            1 Para                 Review of Systems  Review of Systems    Negative except for pertinent findings for positives per HPI     Objective:    Physical Exam    BP (!) 144/90 (BP Location: Left arm, Patient Position: Sitting, BP Method: Medium (Automatic))   Pulse 80   Temp 97.8 °F (36.6 °C) (Oral)   Resp 20   Ht 5' 7" (1.702 m)   Wt 106.3 kg (234 lb 6.4 oz)   LMP 2022 (Approximate)   SpO2 100%   BMI 36.71 kg/m²   GENERAL: Well-developed female. No acute distress.    SKIN: Normal to inspection, warm and intact.  BREASTS: No rashes or erythema. " No masses, lumps, discharge, tenderness.  ABDOMEN: Soft, non tender.  VULVA: General appearance normal; external genitalia with no lesions or erythema.  BLADDER: No tenderness.  VAGINA: Mucosa/vaginal vault atrophic, no abnormal discharge or lesions.  CERVIX: atrophic, parous appearing os, no erythema or abnormal discharge.  BIMANUAL EXAM: reveals a 12 week-sized uterus. The uterus is non tender. Toby adnexa reveal no tenderness.  PSYCHIATRIC: Patient is oriented to person, place, and time. Mood and affect are normal.      Assessment:       1. Encounter for annual routine gynecological examination    2. Pelvic pressure in female    3. Menopause       Plan:   Corinne was seen today for gynecologic exam.    Diagnoses and all orders for this visit:    Encounter for annual routine gynecological examination    Pelvic pressure in female  -     POCT Urinalysis, Dipstick, Automated, W/O Scope  -     US Pelvis Comp with Transvag NON-OB (xpd; Future  -     Urinalysis, Reflex to Urine Culture    Menopause  -     US Pelvis Comp with Transvag NON-OB (xpd; Future  -     Follicle Stimulating Hormone; Future    Pelvic today, pap utd per ACOG  Pelivc uls for pelvic pressure, possible PMB-FSH level and pelvic uls  Urine dip-blood, U/A ordered  Follow up in about 1 year (around 10/2/2024) for annual exam.

## 2023-10-06 ENCOUNTER — TELEPHONE (OUTPATIENT)
Dept: INTERNAL MEDICINE | Facility: CLINIC | Age: 53
End: 2023-10-06
Payer: COMMERCIAL

## 2023-10-06 DIAGNOSIS — R79.89 ABNORMAL CBC: ICD-10-CM

## 2023-10-06 DIAGNOSIS — E11.3292 TYPE 2 DIABETES MELLITUS WITH LEFT EYE AFFECTED BY MILD NONPROLIFERATIVE RETINOPATHY WITHOUT MACULAR EDEMA, WITHOUT LONG-TERM CURRENT USE OF INSULIN: Primary | Chronic | ICD-10-CM

## 2023-10-06 NOTE — TELEPHONE ENCOUNTER
Please contact the pt and inquire if she has not been feeling well or had any recent illness and let me know. Her WBC on labs is elevated. Thanks

## 2023-10-11 ENCOUNTER — TELEPHONE (OUTPATIENT)
Dept: GYNECOLOGY | Facility: CLINIC | Age: 53
End: 2023-10-11
Payer: COMMERCIAL

## 2023-10-11 NOTE — TELEPHONE ENCOUNTER
Pt notified that FSH was 60, considered postmenopausal and no cycle in over 3 years.    Pelvic uls showed small fibroid, ovaries WNL and endometrial lining WNL. No other c/o pelvic pressure. Pt verbalized understanding.

## 2023-12-04 RX ORDER — PREDNISONE 10 MG/1
TABLET ORAL
Qty: 41 TABLET | Refills: 0 | Status: SHIPPED | OUTPATIENT
Start: 2023-12-04 | End: 2024-01-08 | Stop reason: ALTCHOICE

## 2024-01-05 NOTE — PROGRESS NOTES
Brianna Srinivasan, MARIYA   [facility]  [address] [id number]      PATIENT NAME: Joyce Marie Boast  : 1970  DATE: 24  MRN: [id number]      Reason for Visit / Chief Complaint: Follow-up and Abdominal Pain (Stomach pain since Saturday)       History of Present Illness / Problem Focused Workflow     Joyce Marie Boast is a 53 y.o. Black or  female presents to the clinic for HTN and DM f/u. PMH HTN, constipation, AUB, DM, Positive TARA, DANA on CPAP, liposuction of abd (3/28/22) and back (22), bilateral CTR and obesity. Followed by [facility] and [facility].     Today, pt continues to report bilateral aching, throbbing finger pain, onset several months, with associated swelling, weakness and decreased . Has appt to establish care with rheumatology in . Reports some improvement in bilateral hand/joint pain with oral steroids. Reports med compliance. Has not been following ADA/low sodium diet or exercise routinely; especially during the holidays.  Continues to report has not been compliant with CPAP. Did not complete labs prior to appt as ordered. Denies chest pain, shortness of breath, cough, fever, headache, dizziness, weakness, abdominal pain, nausea, vomiting, diarrhea, dysuria, depression, anxiety, SI/HI.      Review of Systems     Review of Systems     See HPI for details    Constitutional: Denies Change in appetite. Denies Chills. Denies Fever. Denies Night sweats.  Eye: Denies Blurred vision. Denies Discharge. Denies Eye pain.  ENT: Denies Decreased hearing. Denies Sore throat. Denies Swollen glands.  Respiratory: Denies Cough. Denies Shortness of breath. Denies Shortness of breath with exertion. Denies Wheezing.  Cardiovascular: Denies Chest pain at rest. Denies Chest pain with exertion. Denies Irregular heartbeat. Denies Palpitations. Denies Edema.  Gastrointestinal: Denies Abdominal  pain. Denies Diarrhea. Denies Nausea. Denies Vomiting. Denies Hematemesis or Hematochezia.  Genitourinary: Denies Dysuria. Denies Urinary frequency. Denies Urinary urgency. Denies Blood in urine.  Endocrine: Denies Cold intolerance. Denies Excessive thirst. Denies Heat intolerance. Denies Weight loss. Denies Weight gain.  Musculoskeletal: Admits Painful joints. Denies Weakness.  Integumentary: Denies Rash. Denies Itching. Denies Dry skin.  Neurologic: Denies Dizziness. Denies Fainting. Denies Headache.  Psychiatric: Denies Depression. Denies Anxiety. Denies Suicidal/Homicidal ideations.    All Other ROS: Negative except as stated in HPI.     Medical / Surgical / Social / Family History       ----------------------------  Diabetes mellitus, type 2  Hyperlipidemia  Hypertension     Past Surgical History:   Procedure Laterality Date    CARPAL TUNNEL RELEASE Bilateral     TUBAL LIGATION         Social History     Socioeconomic History    Marital status: Single    Number of children: 2   Tobacco Use    Smoking status: Never     Passive exposure: Never    Smokeless tobacco: Never   Substance and Sexual Activity    Alcohol use: Yes     Comment: wine on occ    Drug use: Never    Sexual activity: Yes     Birth control/protection: See Surgical Hx     Social Determinants of Health     Financial Resource Strain: Low Risk  (1/8/2024)    Overall Financial Resource Strain (CARDIA)     Difficulty of Paying Living Expenses: Not hard at all   Food Insecurity: No Food Insecurity (1/8/2024)    Hunger Vital Sign     Worried About Running Out of Food in the Last Year: Never true     Ran Out of Food in the Last Year: Never true   Transportation Needs: No Transportation Needs (1/8/2024)    PRAPARE - Transportation     Lack of Transportation (Medical): No     Lack of Transportation (Non-Medical): No   Physical Activity: Inactive (1/8/2024)    Exercise Vital Sign     Days of Exercise per Week: 0 days     Minutes of Exercise per Session: 0  min   Stress: Stress Concern Present (1/8/2024)    [employer] - Occupational Stress Questionnaire     Feeling of Stress : To some extent   Social Connections: Moderately Isolated (1/8/2024)    Social Connection and Isolation Panel [NHANES]     Frequency of Communication with Friends and Family: More than three times a week     Frequency of Social Gatherings with Friends and Family: More than three times a week     Attends Druze Services: More than 4 times per year     Active Member of Clubs or Organizations: No     Attends Club or Organization Meetings: Never     Marital Status:    Housing Stability: Low Risk  (1/8/2024)    Housing Stability Vital Sign     Unable to Pay for Housing in the Last Year: No     Number of Places Lived in the Last Year: 1     Unstable Housing in the Last Year: No        Family History   Problem Relation Age of Onset    Cancer Maternal Aunt     Cancer Maternal Grandmother         Medications and Allergies     Medications  Current Outpatient Medications   Medication Instructions    amLODIPine (NORVASC) 5 mg, Oral, Nightly    aspirin (ECOTRIN) 81 mg, Oral    blood sugar diagnostic Strp Glucometer Strips, See Instructions, Use to check CBG daily and prn    blood-glucose meter kit   Glucometer, See Instructions, Use to check CBG daily and prn. Dx: diabetes, # 1 EA, 0 Refill(s), Pharmacy: [facility] #[id number], 170, cm, Height/Length Dosing, 01/11/22 8:27:00 CST, 117.7, kg, Weight Dosing, 01/11/22 8:27:00 CST    diclofenac (VOLTAREN) 75 mg, Oral, 2 times daily PRN    lancets 31 gauge Misc 1 lancet , Misc.(Non-Drug; Combo Route), Daily    lancets OP (IHEALTH) OP   Glucometer Lancets, See Instructions, Use to CBG daily and prn., # 50 EA, 11 Refill(s), Pharmacy: [facility] #[id number], 170, cm, Height/Length Dosing, 01/11/22 8:27:00 CST, 117.7, kg, Weight Dosing, 01/11/22 8:27:00 CST    linaCLOtide (LINZESS) 290 mcg Cap capsule TAKE 1 CAPSULE BY  "MOUTH DAILY AS NEEDED FOR CONSTIPATION TAKE 30 MINUTES BEFORE BREAKFAST, ON AN ON AN EMPTY STOMACH    metFORMIN (GLUCOPHAGE-XR) 500 mg, Oral, With breakfast    pravastatin (PRAVACHOL) 20 mg, Oral, Nightly    tiZANidine (ZANAFLEX) 4 mg, Oral, Every 8 hours PRN, May cause drowsiness         Allergies  Review of patient's allergies indicates:  No Known Allergies    Physical Examination     /86 (BP Location: Right arm, Patient Position: Sitting, BP Method: Large (Automatic))   Pulse 75   Temp 98.1 °F (36.7 °C) (Oral)   Resp 20   Ht 5' 7" (1.702 m)   Wt 107.6 kg (237 lb 3.2 oz)   LMP 04/01/2022 (Approximate)   BMI 37.15 kg/m²     Physical Exam  Constitutional:       Appearance: She is obese.   Musculoskeletal:      Right hand: Swelling, deformity and tenderness present. Decreased range of motion. Decreased strength.      Left hand: Swelling, deformity and tenderness present. Decreased range of motion. Decreased strength.         General: Alert and oriented, No acute distress.  Head: Normocephalic, Atraumatic.  Eye: Pupils are equal, round and reactive to light, Extraocular movements are intact, Sclera non-icteric.  Ears/Nose/Throat: Normal, Mucosa moist,Clear.  Neck/Thyroid: Supple, Non-tender, No carotid bruit, No lymphadenopathy, No JVD, Full range of motion.  Respiratory: Clear to auscultation bilaterally; No wheezes, rales or rhonchi,Non-labored respirations, Symmetrical chest wall expansion.  Cardiovascular: Regular rate and rhythm, S1/S2 normal, No murmurs, rubs or gallops.  Gastrointestinal: Soft, Non-tender, Non-distended, Normal bowel sounds, No palpable organomegaly.  Integumentary: Warm, Dry, Intact, No suspicious lesions or rashes.  Extremities: No clubbing, cyanosis or edema  Neurologic: No focal deficits, Cranial Nerves II-XII are grossly intact, Motor strength normal upper and lower extremities, Sensory exam intact.  Psychiatric: Normal interaction, Coherent speech, Appropriate affect "       Results     Lab Results   Component Value Date    WBC 14.56 (H) 10/02/2023    HGB 13.8 10/02/2023    HCT 43.2 10/02/2023     10/02/2023    CHOL 172 10/02/2023    TRIG 117 10/02/2023    ALT 31 10/02/2023    AST 21 10/02/2023     10/02/2023    K 3.6 10/02/2023    CREATININE 0.95 10/02/2023    BUN 9.0 (L) 10/02/2023    CO2 28 10/02/2023    TSH 1.581 10/02/2023    INR 1.02 04/10/2020    HGBA1C 6.5 10/02/2023     Assessment and Plan (including Health Maintenance)     Plan:     1. Primary hypertension  At goal.  Continue amlodipine.  Follow a low sodium (less than 2 grams of sodium per day), DASH diet.   Continue medications as prescribed.  Monitor blood pressure and report any consistent values greater than 140/90 and keep a log.  Maintain healthy weight with a BMI goal of <30.   Aerobic exercise for 150 minutes per week (or 5 days a week for 30 minutes each day).    2. Class 2 severe obesity due to excess calories with serious comorbidity and body mass index (BMI) of 37.0 to 37.9 in adult  BMI 37. Goal BMI <30.  Aerobic exercise 150 minutes per week.  Avoid soda, simple sugars, sweets, excessive rice, pasta, potatoes or bread.   Choose brown options when available and portion control.  Limit fast foods and fried foods.   Choose complex carbs in moderation (ex: green, leafy vegetables, beans, oatmeal).  Eat plenty of fresh fruits and vegetables with lean meats daily.   Consider permanent healthy lifestyle changes.      3. Obstructive sleep apnea syndrome  Reports will resume wearing CPAP nightly.  Expect lifetime use and decreased daytime sleepiness/fatigue.   Avoid excessive alcohol, smoking and overuse of sedatives at bedtime.  Weight management discussed.  Adjust sleep position as needed for increased comfort.    4. Positive TARA  Keep rheumatology visit as scheduled to establish care.  TARA speckled 1:160  Depomedrol 80 mg IM today.  Continue diclofenac and tizanidine prn.   Gentle stretching  exercises encouraged.     5. Need for vaccination  Flu vaccine given.     Problem List Items Addressed This Visit          Cardiac/Vascular    Hypertension (Chronic)       Immunology/Multi System    Positive TARA (antinuclear antibody) - Primary (Chronic)    Relevant Medications    methylPREDNISolone acetate injection 80 mg (Start on 1/8/2024  9:00 AM)       Endocrine    Diabetes mellitus (Chronic)     Other Visit Diagnoses       Encounter for screening mammogram for malignant neoplasm of breast        Relevant Orders    Mammo Digital Screening Bilat w/ Ryley    Need for vaccination        Relevant Medications    influenza (QUADRIVALENT PF) vaccine 0.5 mL (Start on 1/8/2024  9:51 AM)                Health Maintenance Due   Topic Date Due    Influenza Vaccine (1) 09/01/2023    COVID-19 Vaccine (5 - 2023-24 season) 09/01/2023    Mammogram  01/19/2024    Cervical Cancer Screening  02/24/2024       Follow up in about 3 weeks (around 1/29/2024) for Follow up, Diabetes, Lab Results, Virtual Visit.        Signature:  MARIYA Gonzalez  [facility]  [address] [facility] [id number]

## 2024-01-08 ENCOUNTER — OFFICE VISIT (OUTPATIENT)
Dept: INTERNAL MEDICINE | Facility: CLINIC | Age: 54
End: 2024-01-08
Payer: COMMERCIAL

## 2024-01-08 ENCOUNTER — LAB VISIT (OUTPATIENT)
Dept: LAB | Facility: HOSPITAL | Age: 54
End: 2024-01-08
Attending: NURSE PRACTITIONER
Payer: COMMERCIAL

## 2024-01-08 VITALS
RESPIRATION RATE: 20 BRPM | DIASTOLIC BLOOD PRESSURE: 86 MMHG | BODY MASS INDEX: 37.23 KG/M2 | SYSTOLIC BLOOD PRESSURE: 138 MMHG | WEIGHT: 237.19 LBS | TEMPERATURE: 98 F | HEART RATE: 75 BPM | HEIGHT: 67 IN

## 2024-01-08 DIAGNOSIS — Z12.31 ENCOUNTER FOR SCREENING MAMMOGRAM FOR MALIGNANT NEOPLASM OF BREAST: ICD-10-CM

## 2024-01-08 DIAGNOSIS — I10 PRIMARY HYPERTENSION: Chronic | ICD-10-CM

## 2024-01-08 DIAGNOSIS — Z23 NEED FOR VACCINATION: ICD-10-CM

## 2024-01-08 DIAGNOSIS — R76.8 POSITIVE ANA (ANTINUCLEAR ANTIBODY): Primary | Chronic | ICD-10-CM

## 2024-01-08 DIAGNOSIS — E11.3292 TYPE 2 DIABETES MELLITUS WITH LEFT EYE AFFECTED BY MILD NONPROLIFERATIVE RETINOPATHY WITHOUT MACULAR EDEMA, WITHOUT LONG-TERM CURRENT USE OF INSULIN: Chronic | ICD-10-CM

## 2024-01-08 PROCEDURE — 99214 OFFICE O/P EST MOD 30 MIN: CPT | Mod: S$PBB,,, | Performed by: NURSE PRACTITIONER

## 2024-01-08 PROCEDURE — 3051F HG A1C>EQUAL 7.0%<8.0%: CPT | Mod: CPTII,,, | Performed by: NURSE PRACTITIONER

## 2024-01-08 PROCEDURE — 1159F MED LIST DOCD IN RCRD: CPT | Mod: CPTII,,, | Performed by: NURSE PRACTITIONER

## 2024-01-08 PROCEDURE — 90471 IMMUNIZATION ADMIN: CPT | Mod: PBBFAC

## 2024-01-08 PROCEDURE — 99215 OFFICE O/P EST HI 40 MIN: CPT | Mod: PBBFAC | Performed by: NURSE PRACTITIONER

## 2024-01-08 PROCEDURE — 3079F DIAST BP 80-89 MM HG: CPT | Mod: CPTII,,, | Performed by: NURSE PRACTITIONER

## 2024-01-08 PROCEDURE — 1160F RVW MEDS BY RX/DR IN RCRD: CPT | Mod: CPTII,,, | Performed by: NURSE PRACTITIONER

## 2024-01-08 PROCEDURE — 3008F BODY MASS INDEX DOCD: CPT | Mod: CPTII,,, | Performed by: NURSE PRACTITIONER

## 2024-01-08 PROCEDURE — 90686 IIV4 VACC NO PRSV 0.5 ML IM: CPT | Mod: PBBFAC

## 2024-01-08 PROCEDURE — 96372 THER/PROPH/DIAG INJ SC/IM: CPT | Mod: PBBFAC,59

## 2024-01-08 PROCEDURE — 3075F SYST BP GE 130 - 139MM HG: CPT | Mod: CPTII,,, | Performed by: NURSE PRACTITIONER

## 2024-01-08 RX ORDER — TIZANIDINE 4 MG/1
4 TABLET ORAL EVERY 8 HOURS PRN
Qty: 30 TABLET | Refills: 6 | Status: SHIPPED | OUTPATIENT
Start: 2024-01-08

## 2024-01-08 RX ORDER — METHYLPREDNISOLONE ACETATE 40 MG/ML
80 INJECTION, SUSPENSION INTRA-ARTICULAR; INTRALESIONAL; INTRAMUSCULAR; SOFT TISSUE
Status: COMPLETED | OUTPATIENT
Start: 2024-01-08 | End: 2024-01-08

## 2024-01-08 RX ORDER — DICLOFENAC SODIUM 75 MG/1
75 TABLET, DELAYED RELEASE ORAL 2 TIMES DAILY PRN
Qty: 45 TABLET | Refills: 3 | Status: SHIPPED | OUTPATIENT
Start: 2024-01-08

## 2024-01-08 RX ORDER — AMLODIPINE BESYLATE 5 MG/1
5 TABLET ORAL NIGHTLY
Qty: 90 TABLET | Refills: 3 | Status: SHIPPED | OUTPATIENT
Start: 2024-01-08

## 2024-01-08 RX ORDER — PRAVASTATIN SODIUM 20 MG/1
20 TABLET ORAL NIGHTLY
Qty: 90 TABLET | Refills: 3 | Status: SHIPPED | OUTPATIENT
Start: 2024-01-08

## 2024-01-08 RX ADMIN — METHYLPREDNISOLONE ACETATE 80 MG: 40 INJECTION, SUSPENSION INTRA-ARTICULAR; INTRALESIONAL; INTRAMUSCULAR; SOFT TISSUE at 09:01

## 2024-01-08 RX ADMIN — INFLUENZA VIRUS VACCINE 0.5 ML: 15; 15; 15; 15 SUSPENSION INTRAMUSCULAR at 09:01

## 2024-01-23 ENCOUNTER — HOSPITAL ENCOUNTER (OUTPATIENT)
Dept: RADIOLOGY | Facility: HOSPITAL | Age: 54
Discharge: HOME | End: 2024-01-23
Attending: NURSE PRACTITIONER
Payer: COMMERCIAL

## 2024-01-23 DIAGNOSIS — Z12.31 ENCOUNTER FOR SCREENING MAMMOGRAM FOR MALIGNANT NEOPLASM OF BREAST: ICD-10-CM

## 2024-01-23 PROCEDURE — 77067 SCR MAMMO BI INCL CAD: CPT | Mod: TC

## 2024-01-23 PROCEDURE — 77063 BREAST TOMOSYNTHESIS BI: CPT | Mod: 26,,, | Performed by: RADIOLOGY

## 2024-01-23 PROCEDURE — 77067 SCR MAMMO BI INCL CAD: CPT | Mod: 26,,, | Performed by: RADIOLOGY

## 2024-01-31 ENCOUNTER — OFFICE VISIT (OUTPATIENT)
Dept: INTERNAL MEDICINE | Facility: CLINIC | Age: 54
End: 2024-01-31
Payer: COMMERCIAL

## 2024-01-31 VITALS — WEIGHT: 237 LBS | BODY MASS INDEX: 37.2 KG/M2 | HEIGHT: 67 IN

## 2024-01-31 DIAGNOSIS — R76.8 POSITIVE ANA (ANTINUCLEAR ANTIBODY): Chronic | ICD-10-CM

## 2024-01-31 DIAGNOSIS — E11.3292 TYPE 2 DIABETES MELLITUS WITH LEFT EYE AFFECTED BY MILD NONPROLIFERATIVE RETINOPATHY WITHOUT MACULAR EDEMA, WITHOUT LONG-TERM CURRENT USE OF INSULIN: Primary | Chronic | ICD-10-CM

## 2024-01-31 PROCEDURE — 99214 OFFICE O/P EST MOD 30 MIN: CPT | Mod: 95,,, | Performed by: NURSE PRACTITIONER

## 2024-01-31 PROCEDURE — 3008F BODY MASS INDEX DOCD: CPT | Mod: CPTII,95,, | Performed by: NURSE PRACTITIONER

## 2024-01-31 PROCEDURE — 1159F MED LIST DOCD IN RCRD: CPT | Mod: CPTII,95,, | Performed by: NURSE PRACTITIONER

## 2024-01-31 PROCEDURE — 3066F NEPHROPATHY DOC TX: CPT | Mod: CPTII,95,, | Performed by: NURSE PRACTITIONER

## 2024-01-31 PROCEDURE — 3061F NEG MICROALBUMINURIA REV: CPT | Mod: CPTII,95,, | Performed by: NURSE PRACTITIONER

## 2024-01-31 PROCEDURE — 3051F HG A1C>EQUAL 7.0%<8.0%: CPT | Mod: CPTII,95,, | Performed by: NURSE PRACTITIONER

## 2024-01-31 PROCEDURE — 1160F RVW MEDS BY RX/DR IN RCRD: CPT | Mod: CPTII,95,, | Performed by: NURSE PRACTITIONER

## 2024-01-31 RX ORDER — INSULIN PUMP SYRINGE, 3 ML
EACH MISCELLANEOUS
Qty: 1 EACH | Refills: 0 | Status: SHIPPED | OUTPATIENT
Start: 2024-01-31 | End: 2024-03-18 | Stop reason: CLARIF

## 2024-01-31 NOTE — PROGRESS NOTES
Audio Only Telehealth Visit     The patient location is: at home  The chief complaint leading to consultation is: DM and joint pain f/u  Visit type: Virtual visit with audio only (telephone)  Total time spent with patient: 20 mins     The reason for the audio only service rather than synchronous audio and video virtual visit was related to technical difficulties on pt's behalf.     Each patient to whom I provide medical services by telemedicine is:  (1) informed of the relationship between the physician and patient and the respective role of any other health care provider with respect to management of the patient; and (2) notified that they may decline to receive medical services by telemedicine and may withdraw from such care at any time. Patient verbally consented to receive this service via voice-only telephone call.    Billing Provider: MARIYA Gonzalez  Level of Service:   Patient PCP Information       Provider PCP Type    MARIYA Gonzalez General            Reason for Visit / Chief Complaint: Follow-up (Follow up visit, discuss labs, patient need glucometer, strips and  lancets.)       History of Present Illness / Problem Focused Workflow     Joyce Marie Boast is a 53 y.o. Black or  female for DM f/u. Medical problems include HTN, constipation, AUB, DM, Positive TARA, DANA on CPAP, liposuction of abd (3/28/22) and back (4/7/22), bilateral CTR and obesity. Followed by [facility] clinic and [facility].      Today, pt reports minimal improvement in joint pain with depo medrol IM and prn meds. Continues to endorse pain mostly to hands. Has rheumatology visit scheduled in June. Continues not to follow ADA diet. Labs reviewed with pt. Reports med compliance. Has been unable to check CBGs d/t current machine not covered. Denies chest pain, shortness of breath, cough, fever, headache, dizziness, weakness, abdominal pain, nausea, vomiting, diarrhea, constipation, dysuria,  depression, anxiety, SI/HI.      Review of Systems     Review of Systems   Musculoskeletal:  Positive for arthralgias.   All other systems reviewed and are negative.       See HPI for details    Constitutional: Denies Change in appetite. Denies Chills. Denies Fever. Denies Night sweats.  Eye: Denies Blurred vision. Denies Discharge. Denies Eye pain.  ENT: Denies Decreased hearing. Denies Sore throat. Denies Swollen glands.  Respiratory: Denies Cough. Denies Shortness of breath. Denies Shortness of breath with exertion. Denies Wheezing.  Cardiovascular: DeniesChest pain at rest. Denies Chest pain with exertion. Denies Irregular heartbeat. Denies Palpitations. Denies Edema.  Gastrointestinal: Denies Abdominal pain. Denies Diarrhea. Denies Nausea. Denies Vomiting. Denies Hematemesis or Hematochezia.  Genitourinary: Denies Dysuria. Denies Urinary frequency. Denies Urinary urgency. Denies Blood in urine.  Endocrine: Denies Cold intolerance. Denies Excessive thirst. Denies Heat intolerance. Denies Weight loss. Denies Weight gain.  Musculoskeletal: Admits Painful joints. Denies Weakness.  Integumentary: Denies Rash. Denies Itching. Denies Dry skin.  Neurologic: Denies Dizziness. Denies Fainting. Denies Headache.  Psychiatric: Denies Depression. Denies Anxiety. Denies Suicidal/Homicidal ideations.    All Other ROS: Negative except as stated in HPI.     Medical / Social / Family History     ----------------------------  Diabetes mellitus, type 2  Hyperlipidemia  Hypertension     Past Surgical History:   Procedure Laterality Date    CARPAL TUNNEL RELEASE Bilateral     TUBAL LIGATION         Social History     Socioeconomic History    Marital status: Single    Number of children: 2   Tobacco Use    Smoking status: Never     Passive exposure: Never    Smokeless tobacco: Never   Substance and Sexual Activity    Alcohol use: Yes     Comment: wine on occ    Drug use: Never    Sexual activity: Yes     Birth control/protection: See  Surgical Hx     Social Determinants of Health     Financial Resource Strain: Low Risk  (1/8/2024)    Overall Financial Resource Strain (CARDIA)     Difficulty of Paying Living Expenses: Not hard at all   Food Insecurity: No Food Insecurity (1/8/2024)    Hunger Vital Sign     Worried About Running Out of Food in the Last Year: Never true     Ran Out of Food in the Last Year: Never true   Transportation Needs: No Transportation Needs (1/8/2024)    PRAPARE - Transportation     Lack of Transportation (Medical): No     Lack of Transportation (Non-Medical): No   Physical Activity: Inactive (1/8/2024)    Exercise Vital Sign     Days of Exercise per Week: 0 days     Minutes of Exercise per Session: 0 min   Stress: Stress Concern Present (1/8/2024)    [employer] [employer] - Occupational Stress Questionnaire     Feeling of Stress : To some extent   Social Connections: Moderately Isolated (1/8/2024)    Social Connection and Isolation Panel [NHANES]     Frequency of Communication with Friends and Family: More than three times a week     Frequency of Social Gatherings with Friends and Family: More than three times a week     Attends Latter-day Services: More than 4 times per year     Active Member of Clubs or Organizations: No     Attends Club or Organization Meetings: Never     Marital Status:    Housing Stability: Low Risk  (1/8/2024)    Housing Stability Vital Sign     Unable to Pay for Housing in the Last Year: No     Number of Places Lived in the Last Year: 1     Unstable Housing in the Last Year: No        Family History   Problem Relation Age of Onset    Cancer Maternal Aunt     Cancer Maternal Grandmother         Medications and Allergies     Medications  Current Outpatient Medications   Medication Instructions    amLODIPine (NORVASC) 5 mg, Oral, Nightly    aspirin (ECOTRIN) 81 mg, Oral    blood sugar diagnostic Strp Glucometer Strips, See Instructions, Use to check CBG daily and prn     [Care Plan reviewed and provided to patient/caregiver] : Care plan reviewed and provided to patient/caregiver "blood-glucose meter kit   Glucometer, See Instructions, Use to check CBG daily and prn. Dx: diabetes, # 1 EA, 0 Refill(s), Pharmacy: [facility] #[id number], 170, cm, Height/Length Dosing, 01/11/22 8:27:00 CST, 117.7, kg, Weight Dosing, 01/11/22 8:27:00 CST    diclofenac (VOLTAREN) 75 mg, Oral, 2 times daily PRN    lancets 31 gauge Misc 1 lancet , Misc.(Non-Drug; Combo Route), Daily    lancets OP (IHEALTH) OP   Glucometer Lancets, See Instructions, Use to CBG daily and prn., # 50 EA, 11 Refill(s), Pharmacy: [facility] #[id number], 170, cm, Height/Length Dosing, 01/11/22 8:27:00 CST, 117.7, kg, Weight Dosing, 01/11/22 8:27:00 CST    linaCLOtide (LINZESS) 290 mcg Cap capsule TAKE 1 CAPSULE BY MOUTH DAILY AS NEEDED FOR CONSTIPATION TAKE 30 MINUTES BEFORE BREAKFAST, ON AN ON AN EMPTY STOMACH    metFORMIN (GLUCOPHAGE-XR) 500 mg, Oral, With breakfast    pravastatin (PRAVACHOL) 20 mg, Oral, Nightly    tiZANidine (ZANAFLEX) 4 mg, Oral, Every 8 hours PRN, May cause drowsiness         Allergies  Review of patient's allergies indicates:  No Known Allergies    Physical Examination   Height and Weight  Height: 5' 7" (170.2 cm)  Weight: 107.5 kg (237 lb)  BSA (Calculated - sq m): 2.25 sq meters  BMI (Calculated): 37.1  Weight in (lb) to have BMI = 25: 159.3]    Physical Exam  Neurological:      Mental Status: She is alert and oriented to person, place, and time.   Psychiatric:         Mood and Affect: Mood normal.         Speech: Speech normal.         Behavior: Behavior normal. Behavior is cooperative.         Thought Content: Thought content normal.         Judgment: Judgment normal.         Results     Lab Results   Component Value Date    WBC 9.51 01/08/2024    HGB 14.1 01/08/2024    HCT 42.3 01/08/2024     01/08/2024    CHOL 172 10/02/2023    TRIG 117 10/02/2023    ALT 31 10/02/2023    AST 21 10/02/2023     10/02/2023    K 3.6 10/02/2023    CREATININE 0.95 10/02/2023    BUN 9.0 (L) 10/02/2023    CO2 28 " 10/02/2023    TSH 1.581 10/02/2023    INR 1.02 04/10/2020    HGBA1C 7.6 (H) 01/08/2024       Assessment and Plan (including Health Maintenance)     Plan:     1. Type 2 diabetes mellitus with left eye affected by mild nonproliferative retinopathy without macular edema, without long-term current use of insulin  A1C 7.6 at goal. Previous A1C 6.9.   Rx ozempic 0.25 mg weekly x 4 weeks then increase to 0.5 mg weekly.  Continue metformin as prescribed.  Rx glucometer, strips and lancets sent to pharmacy.  Check CBG at least daily.  Follow ADA diet.  Avoid soda, simple sweets, and limit rice/pasta/bread/starches and consume brown options when possible.   Maintain healthy weight with BMI goal <30.   Perform aerobic exercise for 150 minutes per week (or 5 days a week for 30 minutes each day).   Examine feet daily.   Obtain annual dilated eye exam.  Eye exam: 6/29/23 at [facility]  Foot exam: 7/24/23     2. Positive TARA (antinuclear antibody)  Improved with depomedrol injection.   Keep rheumatology appt with rheumatology in June.  Continue diclofenac and tizanidine prn.   Gentle stretching exercises encouraged.       Problem List Items Addressed This Visit          Immunology/Multi System    Positive TARA (antinuclear antibody) (Chronic)       Endocrine    Diabetes mellitus - Primary (Chronic)         Health Maintenance Due   Topic Date Due    COVID-19 Vaccine (5 - 2023-24 season) 09/01/2023    Cervical Cancer Screening  02/24/2024       Follow up in about 6 weeks (around 3/13/2024) for Follow up, Diabetes with POC A1C at visit. .        Signature:  MARIYA Gonzalez  [facility]  [address] [facility] [address]      Date of encounter: 1/31/24              This service was not originating from a related E/M service provided within the previous 7 days nor will  to an E/M service or procedure within the next 24 hours or my soonest  available appointment.  Prevailing standard of care was able to be met in this audio-only visit.

## 2024-03-04 ENCOUNTER — DOCUMENTATION ONLY (OUTPATIENT)
Dept: ADMINISTRATIVE | Facility: HOSPITAL | Age: 54
End: 2024-03-04
Payer: COMMERCIAL

## 2024-03-18 ENCOUNTER — OFFICE VISIT (OUTPATIENT)
Dept: INTERNAL MEDICINE | Facility: CLINIC | Age: 54
End: 2024-03-18
Payer: COMMERCIAL

## 2024-03-18 VITALS
DIASTOLIC BLOOD PRESSURE: 87 MMHG | WEIGHT: 238 LBS | HEIGHT: 67 IN | SYSTOLIC BLOOD PRESSURE: 131 MMHG | OXYGEN SATURATION: 100 % | HEART RATE: 76 BPM | TEMPERATURE: 98 F | BODY MASS INDEX: 37.35 KG/M2 | RESPIRATION RATE: 18 BRPM

## 2024-03-18 DIAGNOSIS — R76.8 POSITIVE ANA (ANTINUCLEAR ANTIBODY): Chronic | ICD-10-CM

## 2024-03-18 DIAGNOSIS — E66.01 CLASS 2 SEVERE OBESITY DUE TO EXCESS CALORIES WITH SERIOUS COMORBIDITY AND BODY MASS INDEX (BMI) OF 37.0 TO 37.9 IN ADULT: Chronic | ICD-10-CM

## 2024-03-18 DIAGNOSIS — E11.3292 TYPE 2 DIABETES MELLITUS WITH LEFT EYE AFFECTED BY MILD NONPROLIFERATIVE RETINOPATHY WITHOUT MACULAR EDEMA, WITHOUT LONG-TERM CURRENT USE OF INSULIN: Primary | Chronic | ICD-10-CM

## 2024-03-18 LAB — HBA1C MFR BLD: 7 %

## 2024-03-18 PROCEDURE — 3079F DIAST BP 80-89 MM HG: CPT | Mod: CPTII,,, | Performed by: NURSE PRACTITIONER

## 2024-03-18 PROCEDURE — 96372 THER/PROPH/DIAG INJ SC/IM: CPT | Mod: PBBFAC

## 2024-03-18 PROCEDURE — 3066F NEPHROPATHY DOC TX: CPT | Mod: CPTII,,, | Performed by: NURSE PRACTITIONER

## 2024-03-18 PROCEDURE — 99214 OFFICE O/P EST MOD 30 MIN: CPT | Mod: PBBFAC,25 | Performed by: NURSE PRACTITIONER

## 2024-03-18 PROCEDURE — 3075F SYST BP GE 130 - 139MM HG: CPT | Mod: CPTII,,, | Performed by: NURSE PRACTITIONER

## 2024-03-18 PROCEDURE — 1159F MED LIST DOCD IN RCRD: CPT | Mod: CPTII,,, | Performed by: NURSE PRACTITIONER

## 2024-03-18 PROCEDURE — 3051F HG A1C>EQUAL 7.0%<8.0%: CPT | Mod: CPTII,,, | Performed by: NURSE PRACTITIONER

## 2024-03-18 PROCEDURE — 3061F NEG MICROALBUMINURIA REV: CPT | Mod: CPTII,,, | Performed by: NURSE PRACTITIONER

## 2024-03-18 PROCEDURE — 83036 HEMOGLOBIN GLYCOSYLATED A1C: CPT | Mod: PBBFAC | Performed by: NURSE PRACTITIONER

## 2024-03-18 PROCEDURE — 99214 OFFICE O/P EST MOD 30 MIN: CPT | Mod: S$PBB,,, | Performed by: NURSE PRACTITIONER

## 2024-03-18 PROCEDURE — 3008F BODY MASS INDEX DOCD: CPT | Mod: CPTII,,, | Performed by: NURSE PRACTITIONER

## 2024-03-18 PROCEDURE — 1160F RVW MEDS BY RX/DR IN RCRD: CPT | Mod: CPTII,,, | Performed by: NURSE PRACTITIONER

## 2024-03-18 RX ORDER — LANCETS 30 GAUGE
EACH MISCELLANEOUS DAILY PRN
COMMUNITY
Start: 2024-01-31

## 2024-03-18 RX ORDER — SEMAGLUTIDE 1.34 MG/ML
1 INJECTION, SOLUTION SUBCUTANEOUS
Qty: 3 ML | Refills: 1 | Status: SHIPPED | OUTPATIENT
Start: 2024-03-18 | End: 2024-03-18 | Stop reason: SDUPTHER

## 2024-03-18 RX ORDER — METHYLPREDNISOLONE ACETATE 40 MG/ML
40 INJECTION, SUSPENSION INTRA-ARTICULAR; INTRALESIONAL; INTRAMUSCULAR; SOFT TISSUE
Status: COMPLETED | OUTPATIENT
Start: 2024-03-18 | End: 2024-03-18

## 2024-03-18 RX ORDER — LANCETS 33 GAUGE
EACH MISCELLANEOUS
COMMUNITY
Start: 2024-01-31

## 2024-03-18 RX ORDER — SEMAGLUTIDE 1.34 MG/ML
1 INJECTION, SOLUTION SUBCUTANEOUS
Qty: 3 ML | Refills: 1 | Status: SHIPPED | OUTPATIENT
Start: 2024-03-18 | End: 2024-04-30 | Stop reason: SDUPTHER

## 2024-03-18 RX ORDER — AMLODIPINE BESYLATE 5 MG/1
5 TABLET ORAL DAILY
Qty: 30 TABLET | Refills: 0 | Status: SHIPPED | OUTPATIENT
Start: 2024-03-18 | End: 2024-05-22 | Stop reason: SDUPTHER

## 2024-03-18 RX ADMIN — METHYLPREDNISOLONE ACETATE 40 MG: 40 INJECTION, SUSPENSION INTRA-ARTICULAR; INTRALESIONAL; INTRAMUSCULAR; SOFT TISSUE at 03:03

## 2024-03-18 NOTE — PROGRESS NOTES
Brianna Srinivasan, MARIYA   [facility]  [address] [facility] [address]      PATIENT NAME: Joyce Marie Boast  : 1970  DATE: 3/18/24  MRN: [id number]      Reason for Visit / Chief Complaint: Follow-up       History of Present Illness / Problem Focused Workflow     Joyce Marie Boast is a 53 y.o. Black or  female presents to the clinic for DM f/u. Medical problems include HTN, constipation, AUB, DM, Positive TARA, DANA on CPAP, liposuction of abd (3/28/22) and back (22), bilateral CTR and obesity. Followed by [facility] clinic and [facility].      Today, pt reports ongoing joint pain and dry mouth; has appt to establish care with rheumatology in . Is requesting steroid injection today if possible. Has been taking ozempic weekly as prescribed and is on 0.5 mg dosage. Reports decreased appetite but has some constipation which does not resolve with linzess. Completed a colonic on Friday and will go again on Wednesday. Is attempting ADA diet. CBGs ranging less than 150 when checked. Pt states her bp has been running art high at home when checked with her wrist monitor so she has been taking 4 amlodipine at a time and is now out. BP at goal with clinic monitor; BP reading 200/183 on wrist monitor. Pt educated on proper positioning of wrist monitor and to discontinue its use. Also discussed with pt the consequences of adjusting medications on own; understanding voiced. Denies chest pain, shortness of breath, cough, fever, headache, dizziness, weakness, abdominal pain, nausea, vomiting, diarrhea, dysuria, depression, anxiety, SI/HI.      Review of Systems     Review of Systems     See HPI for details    Constitutional: Denies Change in appetite. Denies Chills. Denies Fever. Denies Night sweats.  Eye: Denies Blurred vision. Denies Discharge. Denies Eye pain.  ENT: Denies Decreased hearing. Denies Sore throat. Denies  Swollen glands.  Respiratory: Denies Cough. Denies Shortness of breath. Denies Shortness of breath with exertion. Denies Wheezing.  Cardiovascular: Denies Chest pain at rest. Denies Chest pain with exertion. Denies Irregular heartbeat. Denies Palpitations. Denies Edema.  Gastrointestinal: Denies Abdominal pain. Admits constipation. Denies Nausea. Denies Vomiting. Denies Hematemesis or Hematochezia.  Genitourinary: Denies Dysuria. Denies Urinary frequency. Denies Urinary urgency. Denies Blood in urine.  Endocrine: Denies Cold intolerance. Denies Excessive thirst. Denies Heat intolerance. Denies Weight loss. Denies Weight gain.  Musculoskeletal: Admits Painful joints. Denies Weakness.  Integumentary: Denies Rash. Denies Itching. Denies Dry skin.  Neurologic: Denies Dizziness. Denies Fainting. Denies Headache.  Psychiatric: Denies Depression. Denies Anxiety. Denies Suicidal/Homicidal ideations.    All Other ROS: Negative except as stated in HPI.     Medical / Surgical / Social / Family History       ----------------------------  Diabetes mellitus, type 2  Hyperlipidemia  Hypertension     Past Surgical History:   Procedure Laterality Date    CARPAL TUNNEL RELEASE Bilateral     TUBAL LIGATION         Social History     Socioeconomic History    Marital status: Single    Number of children: 2   Tobacco Use    Smoking status: Never     Passive exposure: Never    Smokeless tobacco: Never   Substance and Sexual Activity    Alcohol use: Yes     Comment: wine on occ    Drug use: Never    Sexual activity: Yes     Birth control/protection: See Surgical Hx     Social Determinants of Health     Financial Resource Strain: Low Risk  (1/8/2024)    Overall Financial Resource Strain (CARDIA)     Difficulty of Paying Living Expenses: Not hard at all   Food Insecurity: No Food Insecurity (1/8/2024)    Hunger Vital Sign     Worried About Running Out of Food in the Last Year: Never true     Ran Out of Food in the Last Year: Never true    Transportation Needs: No Transportation Needs (1/8/2024)    PRAPARE - Transportation     Lack of Transportation (Medical): No     Lack of Transportation (Non-Medical): No   Physical Activity: Inactive (1/8/2024)    Exercise Vital Sign     Days of Exercise per Week: 0 days     Minutes of Exercise per Session: 0 min   Stress: Stress Concern Present (1/8/2024)    [place] [employer] Occupational Health - Occupational Stress Questionnaire     Feeling of Stress : To some extent   Social Connections: Moderately Isolated (1/8/2024)    Social Connection and Isolation Panel [NHANES]     Frequency of Communication with Friends and Family: More than three times a week     Frequency of Social Gatherings with Friends and Family: More than three times a week     Attends Mandaeism Services: More than 4 times per year     Active Member of Clubs or Organizations: No     Attends Club or Organization Meetings: Never     Marital Status:    Housing Stability: Low Risk  (1/8/2024)    Housing Stability Vital Sign     Unable to Pay for Housing in the Last Year: No     Number of Places Lived in the Last Year: 1     Unstable Housing in the Last Year: No        Family History   Problem Relation Age of Onset    Cancer Maternal Aunt     Cancer Maternal Grandmother         Medications and Allergies     Medications  Current Outpatient Medications   Medication Instructions    amLODIPine (NORVASC) 5 mg, Oral, Nightly    amLODIPine (NORVASC) 5 mg, Oral, Daily    aspirin (ECOTRIN) 81 mg, Oral    blood sugar diagnostic Strp Glucometer Strips, See Instructions, Use to check CBG daily and prn    diclofenac (VOLTAREN) 75 mg, Oral, 2 times daily PRN    lancets 31 gauge Misc 1 lancet , Misc.(Non-Drug; Combo Route), Daily    lancets OP (IHEALTH) OP   Glucometer Lancets, See Instructions, Use to CBG daily and prn., # 50 EA, 11 Refill(s), Pharmacy: [facility] #[id number], 170, cm, Height/Length Dosing, 01/11/22 8:27:00 CST, 117.7, kg, Weight  "Dosing, 01/11/22 8:27:00 CST    linaCLOtide (LINZESS) 290 mcg Cap capsule TAKE 1 CAPSULE BY MOUTH DAILY AS NEEDED FOR CONSTIPATION TAKE 30 MINUTES BEFORE BREAKFAST, ON AN ON AN EMPTY STOMACH    metFORMIN (GLUCOPHAGE-XR) 500 mg, Oral, With breakfast    ONETOUCH ULTRA2 METER Misc Daily PRN    OZEMPIC 1 mg, Subcutaneous, Every 7 days    pravastatin (PRAVACHOL) 20 mg, Oral, Nightly    tiZANidine (ZANAFLEX) 4 mg, Oral, Every 8 hours PRN, May cause drowsiness    TRUEPLUS LANCETS 33 gauge Misc Topical (Top)         Allergies  Review of patient's allergies indicates:  No Known Allergies    Physical Examination     /87 (BP Location: Right arm, Patient Position: Sitting, BP Method: Large (Automatic))   Pulse 76   Temp 98.2 °F (36.8 °C) (Oral)   Resp 18   Ht 5' 7" (1.702 m)   Wt 108 kg (238 lb)   LMP 04/01/2022 (Approximate)   SpO2 100%   BMI 37.28 kg/m²     Physical Exam  Constitutional:       Appearance: She is obese.         General: Alert and oriented, No acute distress.  Head: Normocephalic, Atraumatic.  Eye: Pupils are equal, round and reactive to light, Extraocular movements are intact, Sclera non-icteric.  Ears/Nose/Throat: Normal, Mucosa moist,Clear.  Neck/Thyroid: Supple, Non-tender, No carotid bruit, No lymphadenopathy, No JVD, Full range of motion.  Respiratory: Clear to auscultation bilaterally; No wheezes, rales or rhonchi,Non-labored respirations, Symmetrical chest wall expansion.  Cardiovascular: Regular rate and rhythm, S1/S2 normal, No murmurs, rubs or gallops.  Gastrointestinal: Soft, Non-tender, Non-distended, Normal bowel sounds, No palpable organomegaly.  Integumentary: Warm, Dry, Intact, No suspicious lesions or rashes.  Extremities: No clubbing, cyanosis or edema  Neurologic: No focal deficits, Cranial Nerves II-XII are grossly intact, Motor strength normal upper and lower extremities, Sensory exam intact.  Psychiatric: Normal interaction, Coherent speech, Appropriate affect       Results "     Lab Results   Component Value Date    WBC 9.51 01/08/2024    HGB 14.1 01/08/2024    HCT 42.3 01/08/2024     01/08/2024    CHOL 172 10/02/2023    TRIG 117 10/02/2023    ALT 31 10/02/2023    AST 21 10/02/2023     10/02/2023    K 3.6 10/02/2023    CREATININE 0.95 10/02/2023    BUN 9.0 (L) 10/02/2023    CO2 28 10/02/2023    TSH 1.581 10/02/2023    INR 1.02 04/10/2020    HGBA1C 7.6 (H) 01/08/2024      Latest Reference Range & Units 03/18/24 14:41   Hemoglobin A1C, POC % 7.0       Assessment and Plan (including Health Maintenance)     Plan:     1. Type 2 diabetes mellitus with left eye affected by mild nonproliferative retinopathy without macular edema, without long-term current use of insulin  POC A1C 7.0 at goal. Previous A1C 7.6.   Increase ozempic to 1 mg weekly.  Continue metformin as prescribed.  Continue to check CBG at least daily.  Check CBG at least daily.  Follow ADA diet.  Avoid soda, simple sweets, and limit rice/pasta/bread/starches and consume brown options when possible.   Maintain healthy weight with BMI goal <30.   Perform aerobic exercise for 150 minutes per week (or 5 days a week for 30 minutes each day).   Examine feet daily.   Obtain annual dilated eye exam.  Eye exam: 6/[facility]/23 [facility]  Foot exam: 7/24/23   - POCT HEMOGLOBIN A1C  - Comprehensive Metabolic Panel; Future  - Hemoglobin A1C; Future  - semaglutide (OZEMPIC) 1 mg/dose (4 mg/3 mL); Inject 1 mg into the skin every 7 days.  Dispense: 3 mL; Refill: 1    2. Class 2 severe obesity due to excess calories with serious comorbidity and body mass index (BMI) of 37.0 to 37.9 in adult  BMI 37. Goal BMI <30.  Aerobic exercise 150 minutes per week.  Avoid soda, simple sugars, sweets, excessive rice, pasta, potatoes or bread.   Choose brown options when available and portion control.  Limit fast foods and fried foods.   Choose complex carbs in moderation (ex: green, leafy vegetables, beans, oatmeal).  Eat plenty of fresh  fruits and vegetables with lean meats daily.   Consider permanent healthy lifestyle changes.      3. Positive TARA (antinuclear antibody)  Depomedrol injection 40 mg today.   Keep rheumatology appt with rheumatology in June.  Continue diclofenac and tizanidine prn.   Gentle stretching exercises encouraged.  - methylPREDNISolone acetate injection 40 mg      Problem List Items Addressed This Visit          Immunology/Multi System    Positive TARA (antinuclear antibody) (Chronic)    Relevant Medications    methylPREDNISolone acetate injection 40 mg (Completed)       Endocrine    Diabetes mellitus - Primary (Chronic)    Relevant Medications    semaglutide (OZEMPIC) 1 mg/dose (4 mg/3 mL)    Other Relevant Orders    POCT HEMOGLOBIN A1C (Completed)    Comprehensive Metabolic Panel    Hemoglobin A1C    Class 2 severe obesity due to excess calories with serious comorbidity and body mass index (BMI) of 37.0 to 37.9 in adult (Chronic)         Health Maintenance Due   Topic Date Due    COVID-19 Vaccine (5 - 2023-24 season) 09/01/2023       Follow up in about 6 weeks (around 4/29/2024) for Follow up, Diabetes, Lab Results.        Signature:  MARIYA Gonzalez  [facility]  [address] [facility] [address]

## 2024-04-30 ENCOUNTER — OFFICE VISIT (OUTPATIENT)
Dept: INTERNAL MEDICINE | Facility: CLINIC | Age: 54
End: 2024-04-30
Payer: COMMERCIAL

## 2024-04-30 VITALS
DIASTOLIC BLOOD PRESSURE: 83 MMHG | HEIGHT: 67 IN | WEIGHT: 239.38 LBS | BODY MASS INDEX: 37.57 KG/M2 | SYSTOLIC BLOOD PRESSURE: 128 MMHG | HEART RATE: 82 BPM | OXYGEN SATURATION: 99 % | TEMPERATURE: 98 F

## 2024-04-30 DIAGNOSIS — R76.8 POSITIVE ANA (ANTINUCLEAR ANTIBODY): Chronic | ICD-10-CM

## 2024-04-30 DIAGNOSIS — I10 PRIMARY HYPERTENSION: Chronic | ICD-10-CM

## 2024-04-30 DIAGNOSIS — F51.01 PRIMARY INSOMNIA: ICD-10-CM

## 2024-04-30 DIAGNOSIS — K59.04 CHRONIC IDIOPATHIC CONSTIPATION: ICD-10-CM

## 2024-04-30 DIAGNOSIS — E11.3292 TYPE 2 DIABETES MELLITUS WITH LEFT EYE AFFECTED BY MILD NONPROLIFERATIVE RETINOPATHY WITHOUT MACULAR EDEMA, WITHOUT LONG-TERM CURRENT USE OF INSULIN: Primary | Chronic | ICD-10-CM

## 2024-04-30 PROCEDURE — 3074F SYST BP LT 130 MM HG: CPT | Mod: CPTII,,, | Performed by: NURSE PRACTITIONER

## 2024-04-30 PROCEDURE — 3061F NEG MICROALBUMINURIA REV: CPT | Mod: CPTII,,, | Performed by: NURSE PRACTITIONER

## 2024-04-30 PROCEDURE — 3008F BODY MASS INDEX DOCD: CPT | Mod: CPTII,,, | Performed by: NURSE PRACTITIONER

## 2024-04-30 PROCEDURE — 1159F MED LIST DOCD IN RCRD: CPT | Mod: CPTII,,, | Performed by: NURSE PRACTITIONER

## 2024-04-30 PROCEDURE — 99214 OFFICE O/P EST MOD 30 MIN: CPT | Mod: PBBFAC | Performed by: NURSE PRACTITIONER

## 2024-04-30 PROCEDURE — 3051F HG A1C>EQUAL 7.0%<8.0%: CPT | Mod: CPTII,,, | Performed by: NURSE PRACTITIONER

## 2024-04-30 PROCEDURE — 99214 OFFICE O/P EST MOD 30 MIN: CPT | Mod: S$PBB,,, | Performed by: NURSE PRACTITIONER

## 2024-04-30 PROCEDURE — 1160F RVW MEDS BY RX/DR IN RCRD: CPT | Mod: CPTII,,, | Performed by: NURSE PRACTITIONER

## 2024-04-30 PROCEDURE — 3066F NEPHROPATHY DOC TX: CPT | Mod: CPTII,,, | Performed by: NURSE PRACTITIONER

## 2024-04-30 PROCEDURE — 3079F DIAST BP 80-89 MM HG: CPT | Mod: CPTII,,, | Performed by: NURSE PRACTITIONER

## 2024-04-30 RX ORDER — LUBIPROSTONE 8 UG/1
8 CAPSULE ORAL 2 TIMES DAILY WITH MEALS
Qty: 60 CAPSULE | Refills: 1 | Status: SHIPPED | OUTPATIENT
Start: 2024-04-30 | End: 2024-05-22 | Stop reason: SDUPTHER

## 2024-04-30 RX ORDER — METHYLPREDNISOLONE 4 MG/1
TABLET ORAL
Qty: 21 EACH | Refills: 0 | Status: SHIPPED | OUTPATIENT
Start: 2024-04-30 | End: 2024-05-22 | Stop reason: ALTCHOICE

## 2024-04-30 RX ORDER — SEMAGLUTIDE 1.34 MG/ML
1 INJECTION, SOLUTION SUBCUTANEOUS
Qty: 3 ML | Refills: 6 | Status: SHIPPED | OUTPATIENT
Start: 2024-04-30

## 2024-04-30 RX ORDER — HYDROXYZINE PAMOATE 25 MG/1
25 CAPSULE ORAL NIGHTLY PRN
Qty: 30 CAPSULE | Refills: 1 | Status: SHIPPED | OUTPATIENT
Start: 2024-04-30 | End: 2024-05-22 | Stop reason: DRUGHIGH

## 2024-04-30 NOTE — PROGRESS NOTES
Brianna Srinivasan, MARIYA   [facility]  [address] [facility] [address]      PATIENT NAME: Joyce Marie Boast  : 1970  DATE: 24  MRN: [id number]      Reason for Visit / Chief Complaint: Follow-up (Pt states she been having pain in her hands. Pt states its getting worse in the fingers.)       History of Present Illness / Problem Focused Workflow     Joyce Marie Boast is a 54 y.o. Black or  female presents to the clinic for DM/med changes. Medical problems include HTN, constipation, AUB, DM, Positive TARA, DANA on CPAP, liposuction of abd (3/28/22) and back (22), bilateral CTR and obesity. Followed by [facility] and [facility].      Today, pt reports taking ozempic weekly as prescribed. Reports continued decreased appetite. CBGs ranging 120s when checked. Is following ADA diet. Constipation has improved with colonic. Is taking linzess as prescribed without much improvement. BP remains at goal. Continues to report ongoing, daily bilateral hand pain; requesting another round of steroids. Is also requesting something to help her sleep; is unable to sleep d/t ongoing hand pain. Denies chest pain, shortness of breath, cough, fever, headache, dizziness, weakness, abdominal pain, nausea, vomiting, diarrhea, constipation, dysuria, depression, anxiety, SI/HI.    Review of Systems     Review of Systems     See HPI for details    Constitutional: Denies Change in appetite. Denies Chills. Denies Fever. Denies Night sweats. Admits Insomnia.  Eye: Denies Blurred vision. Denies Discharge. Denies Eye pain.  ENT: Denies Decreased hearing. Denies Sore throat. Denies Swollen glands.  Respiratory: Denies Cough. Denies Shortness of breath. Denies Shortness of breath with exertion. Denies Wheezing.  Cardiovascular: Denies Chest pain at rest. Denies Chest pain with exertion. Denies Irregular heartbeat. Denies Palpitations. Denies  Edema.  Gastrointestinal: Denies Abdominal pain. Denies Diarrhea. Denies Nausea. Denies Vomiting. Denies Hematemesis or Hematochezia. Admits Constipation.  Genitourinary: Denies Dysuria. Denies Urinary frequency. Denies Urinary urgency. Denies Blood in urine.  Endocrine: Denies Cold intolerance. Denies Excessive thirst. Denies Heat intolerance. Denies Weight loss. Denies Weight gain.  Musculoskeletal: Admits Painful joints. Denies Weakness.  Integumentary: Denies Rash. Denies Itching. Denies Dry skin.  Neurologic: Denies Dizziness. Denies Fainting. Denies Headache.  Psychiatric: Denies Depression. Denies Anxiety. Denies Suicidal/Homicidal ideations.    All Other ROS: Negative except as stated in HPI.     Medical / Surgical / Social / Family History       -------------------------------------    Diabetes mellitus, type 2    Hyperlipidemia    Hypertension        Past Surgical History:   Procedure Laterality Date    CARPAL TUNNEL RELEASE Bilateral     TUBAL LIGATION         Social History     Socioeconomic History    Marital status: Single    Number of children: 2   Tobacco Use    Smoking status: Never     Passive exposure: Never    Smokeless tobacco: Never   Substance and Sexual Activity    Alcohol use: Yes     Comment: wine on occ    Drug use: Never    Sexual activity: Yes     Birth control/protection: See Surgical Hx     Social Determinants of Health     Financial Resource Strain: Low Risk  (1/8/2024)    Overall Financial Resource Strain (CARDIA)     Difficulty of Paying Living Expenses: Not hard at all   Food Insecurity: No Food Insecurity (1/8/2024)    Hunger Vital Sign     Worried About Running Out of Food in the Last Year: Never true     Ran Out of Food in the Last Year: Never true   Transportation Needs: No Transportation Needs (1/8/2024)    PRAPARE - Transportation     Lack of Transportation (Medical): No     Lack of Transportation (Non-Medical): No   Physical Activity: Inactive (1/8/2024)    Exercise Vital  Sign     Days of Exercise per Week: 0 days     Minutes of Exercise per Session: 0 min   Stress: Stress Concern Present (1/8/2024)    [employer] - Occupational Stress Questionnaire     Feeling of Stress : To some extent   Housing Stability: Low Risk  (1/8/2024)    Housing Stability Vital Sign     Unable to Pay for Housing in the Last Year: No     Number of Places Lived in the Last Year: 1     Unstable Housing in the Last Year: No        Family History   Problem Relation Name Age of Onset    Cancer Maternal Aunt      Cancer Maternal Grandmother          Medications and Allergies     Medications  Current Outpatient Medications   Medication Instructions    amLODIPine (NORVASC) 5 mg, Oral, Nightly    amLODIPine (NORVASC) 5 mg, Oral, Daily    aspirin (ECOTRIN) 81 mg, Oral    blood sugar diagnostic Strp Glucometer Strips, See Instructions, Use to check CBG daily and prn    diclofenac (VOLTAREN) 75 mg, Oral, 2 times daily PRN    hydrOXYzine pamoate (VISTARIL) 25 mg, Oral, Nightly PRN    lancets 31 gauge Misc 1 lancet , Misc.(Non-Drug; Combo Route), Daily    lancets OP (IHEALTH) OP   Glucometer Lancets, See Instructions, Use to CBG daily and prn., # 50 EA, 11 Refill(s), Pharmacy: [facility] #[id number], 170, cm, Height/Length Dosing, 01/11/22 8:27:00 CST, 117.7, kg, Weight Dosing, 01/11/22 8:27:00 CST    lubiprostone (AMITIZA) 8 mcg, Oral, 2 times daily with meals    metFORMIN (GLUCOPHAGE-XR) 500 mg, Oral, With breakfast    methylPREDNISolone (MEDROL DOSEPACK) 4 mg tablet use as directed    ONETOUCH ULTRA2 METER Misc Daily PRN    OZEMPIC 1 mg, Subcutaneous, Every 7 days    pravastatin (PRAVACHOL) 20 mg, Oral, Nightly    tiZANidine (ZANAFLEX) 4 mg, Oral, Every 8 hours PRN, May cause drowsiness    TRUEPLUS LANCETS 33 gauge Misc Topical (Top)         Allergies  Review of patient's allergies indicates:  No Known Allergies    Physical Examination     /83 (BP Location: Right arm, Patient  "Position: Sitting, BP Method: Large (Automatic))   Pulse 82   Temp 98.1 °F (36.7 °C) (Oral)   Ht 5' 7" (1.702 m)   Wt 108.6 kg (239 lb 6.4 oz)   LMP 04/01/2022 (Approximate)   SpO2 99%   BMI 37.50 kg/m²     Physical Exam  Constitutional:       Appearance: She is obese.   Musculoskeletal:      Right hand: Swelling present. Decreased range of motion. Decreased strength.      Left hand: Swelling present. Decreased range of motion. Decreased strength.         General: Alert and oriented, No acute distress.  Head: Normocephalic, Atraumatic.  Eye: Pupils are equal, round and reactive to light, Extraocular movements are intact, Sclera non-icteric.  Ears/Nose/Throat: Normal, Mucosa moist,Clear.  Neck/Thyroid: Supple, Non-tender, No carotid bruit, No lymphadenopathy, No JVD, Full range of motion.  Respiratory: Clear to auscultation bilaterally; No wheezes, rales or rhonchi,Non-labored respirations, Symmetrical chest wall expansion.  Cardiovascular: Regular rate and rhythm, S1/S2 normal, No murmurs, rubs or gallops.  Gastrointestinal: Soft, Non-tender, Non-distended, Normal bowel sounds, No palpable organomegaly.  Integumentary: Warm, Dry, Intact, No suspicious lesions or rashes.  Extremities: No clubbing, cyanosis or edema  Neurologic: No focal deficits, Cranial Nerves II-XII are grossly intact, Motor strength normal upper and lower extremities, Sensory exam intact.  Psychiatric: Normal interaction, Coherent speech, Appropriate affect       Results     Lab Results   Component Value Date    WBC 9.51 01/08/2024    HGB 14.1 01/08/2024    HCT 42.3 01/08/2024     01/08/2024    CHOL 172 10/02/2023    TRIG 117 10/02/2023    ALT 31 10/02/2023    AST 21 10/02/2023     10/02/2023    K 3.6 10/02/2023    CREATININE 0.95 10/02/2023    BUN 9.0 (L) 10/02/2023    CO2 28 10/02/2023    TSH 1.581 10/02/2023    INR 1.02 04/10/2020    HGBA1C 7.6 (H) 01/08/2024         Assessment and Plan (including Health Maintenance) "     Plan:     1. Type 2 diabetes mellitus with left eye affected by mild nonproliferative retinopathy without macular edema, without long-term current use of insulin  POC A1C 6.0 at goal. Previous A1C 7.0.   Continue ozempic 1 mg weekly.  Continue metformin as prescribed.  Continue to check CBG at least daily.  Check CBG at least daily.  Follow ADA diet.  Avoid soda, simple sweets, and limit rice/pasta/bread/starches and consume brown options when possible.   Maintain healthy weight with BMI goal <30.   Perform aerobic exercise for 150 minutes per week (or 5 days a week for 30 minutes each day).   Examine feet daily.   Obtain annual dilated eye exam.  [facility] exam: 6/29/23 at [facility]  Foot exam: 7/24/23   - POCT HEMOGLOBIN A1C  - semaglutide (OZEMPIC) 1 mg/dose (4 mg/3 mL); Inject 1 mg into the skin every 7 days.  Dispense: 3 mL; Refill: 6  - CBC Auto Differential; Future  - Lipid Panel; Future    2. Primary hypertension  At goal.  Continue amlodipine.  Follow a low sodium (less than 2 grams of sodium per day), DASH diet.   Continue medications as prescribed.  Monitor blood pressure and report any consistent values greater than 140/90 and keep a log.  Maintain healthy weight with a BMI goal of <30.   Aerobic exercise for 150 minutes per week (or 5 days a week for 30 minutes each day).      - TSH; Future    3. Chronic idiopathic constipation  D/C linzess.  Rx amitiza BIDWM.  Educated on high fiber diet and exercise.  Drink at least 64 ozs of water daily.  Eat hot breakfast q am.    - lubiprostone (AMITIZA) 8 MCG Cap; Take 1 capsule (8 mcg total) by mouth 2 (two) times daily with meals.  Dispense: 60 capsule; Refill: 1    4. Primary insomnia  Rx vistaril prn.  May increase to 50 mg (2 caps) if ineffective.  Avoid caffeine, alcohol and stimulants.  Do not use illicit drugs.  Practice positive phrases and repeat throughout the day, yoga, lavender scents or Chamomile tea to promote relaxation.  Set healthy  boundaries, avoid people and conversations that increase stress.  Power down electronic devices at least one hour prior to bedtime.  Keep room dark; use eye mask or relaxation sound machine to promote rest.  Sleep hygiene refers to actions that tend to improve and maintain good sleep:  Sleep as long as necessary to feel rested (usually seven to eight hours for adults) and then get out of bed  Maintain a regular sleep schedule, particularly a regular wake-up time in the morning  Avoid smoking or other nicotine intake, particularly during the evening  Avoid daytime naps, especially if they are longer than 20 to 30 minutes or occur late in the day.    - hydrOXYzine pamoate (VISTARIL) 25 MG Cap; Take 1 capsule (25 mg total) by mouth nightly as needed (insomnia).  Dispense: 30 capsule; Refill: 1    5. Positive TARA (antinuclear antibody)  Rx medrol dose pack.  Hand exercises encouraged.  Keep rheumatology appt as scheduled in June to eval/treat..   - methylPREDNISolone (MEDROL DOSEPACK) 4 mg tablet; use as directed  Dispense: 21 each; Refill: 0      Problem List Items Addressed This Visit          Cardiac/Vascular    Hypertension (Chronic)    Relevant Orders    TSH       Immunology/Multi System    Positive TARA (antinuclear antibody) (Chronic)    Relevant Medications    methylPREDNISolone (MEDROL DOSEPACK) 4 mg tablet       Endocrine    Diabetes mellitus - Primary (Chronic)    Relevant Medications    semaglutide (OZEMPIC) 1 mg/dose (4 mg/3 mL)    Other Relevant Orders    POCT HEMOGLOBIN A1C    CBC Auto Differential    Lipid Panel       GI    Constipation    Relevant Medications    lubiprostone (AMITIZA) 8 MCG Cap     Other Visit Diagnoses       Primary insomnia        Relevant Medications    hydrOXYzine pamoate (VISTARIL) 25 MG Cap              Health Maintenance Due   Topic Date Due    COVID-19 Vaccine (5 - 2023-24 season) 09/01/2023    Eye Exam  06/29/2024     Virtual visit in 3 weeks for insomnia and constipation  f/u.  Follow up in about 5 months (around 9/30/2024) for Follow up, Diabetes, HTN, Lab Results.        Signature:  MARIYA Gonzalez  [facility]  [address] [facility] [address]

## 2024-05-22 ENCOUNTER — OFFICE VISIT (OUTPATIENT)
Dept: INTERNAL MEDICINE | Facility: CLINIC | Age: 54
End: 2024-05-22
Payer: COMMERCIAL

## 2024-05-22 DIAGNOSIS — G47.01 INSOMNIA DUE TO MEDICAL CONDITION: Chronic | ICD-10-CM

## 2024-05-22 DIAGNOSIS — K59.04 CHRONIC IDIOPATHIC CONSTIPATION: Primary | Chronic | ICD-10-CM

## 2024-05-22 PROBLEM — K59.00 CONSTIPATION: Chronic | Status: ACTIVE | Noted: 2022-09-21

## 2024-05-22 PROCEDURE — 1159F MED LIST DOCD IN RCRD: CPT | Mod: CPTII,95,, | Performed by: NURSE PRACTITIONER

## 2024-05-22 PROCEDURE — 3051F HG A1C>EQUAL 7.0%<8.0%: CPT | Mod: CPTII,95,, | Performed by: NURSE PRACTITIONER

## 2024-05-22 PROCEDURE — 1160F RVW MEDS BY RX/DR IN RCRD: CPT | Mod: CPTII,95,, | Performed by: NURSE PRACTITIONER

## 2024-05-22 PROCEDURE — 99214 OFFICE O/P EST MOD 30 MIN: CPT | Mod: 95,,, | Performed by: NURSE PRACTITIONER

## 2024-05-22 PROCEDURE — 3066F NEPHROPATHY DOC TX: CPT | Mod: CPTII,95,, | Performed by: NURSE PRACTITIONER

## 2024-05-22 PROCEDURE — 3061F NEG MICROALBUMINURIA REV: CPT | Mod: CPTII,95,, | Performed by: NURSE PRACTITIONER

## 2024-05-22 RX ORDER — HYDROXYZINE PAMOATE 50 MG/1
50 CAPSULE ORAL NIGHTLY PRN
Qty: 30 CAPSULE | Refills: 4 | Status: SHIPPED | OUTPATIENT
Start: 2024-05-22

## 2024-05-22 RX ORDER — LUBIPROSTONE 8 UG/1
8 CAPSULE ORAL 2 TIMES DAILY WITH MEALS
Qty: 60 CAPSULE | Refills: 6 | Status: SHIPPED | OUTPATIENT
Start: 2024-05-22

## 2024-05-22 NOTE — PROGRESS NOTES
The patient location is: in [place]  The chief complaint leading to consultation is: insomnia and constipation f/u    Visit type: audio only  The reason for the audio only service rather than synchronous audio and video virtual visit was related to technical difficulties (poor connection) on the pt's behalf.     15 minutes of total time spent on the encounter, which includes face to face time and non-face to face time preparing to see the patient (eg, review of tests), Obtaining and/or reviewing separately obtained history, Documenting clinical information in the electronic or other health record, Independently interpreting results (not separately reported) and communicating results to the patient/family/caregiver, or Care coordination (not separately reported).         Each patient to whom he or she provides medical services by telemedicine is:  (1) informed of the relationship between the physician and patient and the respective role of any other health care provider with respect to management of the patient; and (2) notified that he or she may decline to receive medical services by telemedicine and may withdraw from such care at any time.    Billing Provider: MARIYA Gonzalez  Level of Service: MT OFFICE/OUTPT VISIT, EST, LEVL IV, 30-39 MIN  Patient PCP Information       Provider PCP Type    MARIYA Gonzalez General            Reason for Visit / Chief Complaint: Follow-up (Insomnia & constipation, Pt states that she is getting more sleep now, also she is going to the bathroom now so things are improving.)       History of Present Illness / Problem Focused Workflow     Joyce Marie Boast is a 54 y.o. Black or African American female for constipation and insomnia f/u. Medical problems include HTN, constipation, AUB, DM, Positive TARA, DANA on CPAP, liposuction of abd (3/28/22) and back (4/7/22), bilateral CTR and obesity. Followed by [facility] clinic and [facility].      Today, pt reports  improvement in hand and joint pain with taking medrol dose pack. Pt has visit to [facility] with rheum in June. Reports constipation has improved with amitiza. Is going more regularly. Pt also reports is sleeping with meds but could be better. Denies chest pain, shortness of breath, cough, fever, headache, dizziness, weakness, abdominal pain, nausea, vomiting, diarrhea, constipation, dysuria, depression, anxiety, SI/HI.      Review of Systems     Review of Systems     See HPI for details    Constitutional: Denies Change in appetite. Denies Chills. Denies Fever. Denies Night sweats.  Eye: Denies Blurred vision. Denies Discharge. Denies Eye pain.  ENT: Denies Decreased hearing. Denies Sore throat. Denies Swollen glands.  Respiratory: Denies Cough. Denies Shortness of breath. Denies Shortness of breath with exertion. Denies Wheezing.  Cardiovascular: DeniesChest pain at rest. Denies Chest pain with exertion. Denies Irregular heartbeat. Denies Palpitations. Denies Edema.  Gastrointestinal: Denies Abdominal pain. Denies Diarrhea. Denies Nausea. Denies Vomiting. Denies Hematemesis or Hematochezia.  Genitourinary: Denies Dysuria. Denies Urinary frequency. Denies Urinary urgency. Denies Blood in urine.  Endocrine: Denies Cold intolerance. Denies Excessive thirst. Denies Heat intolerance. Denies Weight loss. Denies Weight gain.  Musculoskeletal: Denies Painful joints. Denies Weakness.  Integumentary: Denies Rash. Denies Itching. Denies Dry skin.  Neurologic: Denies Dizziness. Denies Fainting. Denies Headache.  Psychiatric: Denies Depression. Denies Anxiety. Denies Suicidal/Homicidal ideations. Admits insomnia    All Other ROS: Negative except as stated in HPI.     Medical / Social / Family History     -------------------------------------    Diabetes mellitus, type 2    Hyperlipidemia    Hypertension        Past Surgical History:   Procedure Laterality Date    CARPAL TUNNEL RELEASE Bilateral     TUBAL LIGATION         Social  History     Socioeconomic History    Marital status: Single    Number of children: 2   Tobacco Use    Smoking status: Never     Passive exposure: Never    Smokeless tobacco: Never   Substance and Sexual Activity    Alcohol use: Yes     Comment: wine on occ    Drug use: Never    Sexual activity: Yes     Birth control/protection: See Surgical Hx     Social Determinants of Health     Financial Resource Strain: Low Risk  (1/8/2024)    Overall Financial Resource Strain (CARDIA)     Difficulty of Paying Living Expenses: Not hard at all   Food Insecurity: No Food Insecurity (1/8/2024)    Hunger Vital Sign     Worried About Running Out of Food in the Last Year: Never true     Ran Out of Food in the Last Year: Never true   Transportation Needs: No Transportation Needs (1/8/2024)    PRAPARE - Transportation     Lack of Transportation (Medical): No     Lack of Transportation (Non-Medical): No   Physical Activity: Inactive (1/8/2024)    Exercise Vital Sign     Days of Exercise per Week: 0 days     Minutes of Exercise per Session: 0 min   Stress: Stress Concern Present (1/8/2024)    [employer] - Occupational Stress Questionnaire     Feeling of Stress : To some extent   Housing Stability: Low Risk  (1/8/2024)    Housing Stability Vital Sign     Unable to Pay for Housing in the Last Year: No     Number of Places Lived in the Last Year: 1     Unstable Housing in the Last Year: No        Family History   Problem Relation Name Age of Onset    Cancer Maternal Aunt      Cancer Maternal Grandmother          Medications and Allergies     Medications  Current Outpatient Medications   Medication Instructions    amLODIPine (NORVASC) 5 mg, Oral, Nightly    aspirin (ECOTRIN) 81 mg, Oral    blood sugar diagnostic Strp Glucometer Strips, See Instructions, Use to check CBG daily and prn    diclofenac (VOLTAREN) 75 mg, Oral, 2 times daily PRN    hydrOXYzine pamoate (VISTARIL) 50 mg, Oral, Nightly PRN    lancets 31  gauge Misc 1 lancet , Misc.(Non-Drug; Combo Route), Daily    lancets OP (IHEALTH) OP   Glucometer Lancets, See Instructions, Use to CBG daily and prn., # 50 EA, 11 Refill(s), Pharmacy: [facility] #[id number], 170, cm, Height/Length Dosing, 01/11/22 8:27:00 CST, 117.7, kg, Weight Dosing, 01/11/22 8:27:00 CST    lubiprostone (AMITIZA) 8 mcg, Oral, 2 times daily with meals    metFORMIN (GLUCOPHAGE-XR) 500 mg, Oral, With breakfast    ONETOUCH ULTRA2 METER Misc Daily PRN    OZEMPIC 1 mg, Subcutaneous, Every 7 days    pravastatin (PRAVACHOL) 20 mg, Oral, Nightly    tiZANidine (ZANAFLEX) 4 mg, Oral, Every 8 hours PRN, May cause drowsiness    TRUEPLUS LANCETS 33 gauge Misc Topical (Top)         Allergies  Review of patient's allergies indicates:  No Known Allergies    Physical Examination    ]    Physical Exam  Neurological:      Mental Status: She is alert and oriented to person, place, and time.   Psychiatric:         Mood and Affect: Mood normal.         Speech: Speech normal.         Behavior: Behavior normal. Behavior is cooperative.         Thought Content: Thought content normal.         Judgment: Judgment normal.           Results     Lab Results   Component Value Date    WBC 9.51 01/08/2024    HGB 14.1 01/08/2024    HCT 42.3 01/08/2024     01/08/2024    CHOL 172 10/02/2023    TRIG 117 10/02/2023    ALT 31 10/02/2023    AST 21 10/02/2023     10/02/2023    K 3.6 10/02/2023    CREATININE 0.95 10/02/2023    BUN 9.0 (L) 10/02/2023    CO2 28 10/02/2023    TSH 1.581 10/02/2023    INR 1.02 04/10/2020    HGBA1C 7.6 (H) 01/08/2024       Assessment and Plan (including Health Maintenance)     Plan:     1. Chronic idiopathic constipation  Improved.  Continue amitiza BID.  Educated on high fiber diet and exercise.  Drink at least 64 ozs of water daily.  Eat hot breakfast q am.      - lubiprostone (AMITIZA) 8 MCG Cap; Take 1 capsule (8 mcg total) by mouth 2 (two) times daily with meals.  Dispense: 60 capsule;  Refill: 6    2. Insomnia due to medical condition  Increase vistaril to 50 mg prn nightly.  Avoid caffeine, alcohol and stimulants.  Do not use illicit drugs.  Practice positive phrases and repeat throughout the day, yoga, lavender scents or Chamomile tea to promote relaxation.  Set healthy boundaries, avoid people and conversations that increase stress.  Power down electronic devices at least one hour prior to bedtime.  Keep room dark; use eye mask or relaxation sound machine to promote rest.  Sleep hygiene refers to actions that tend to improve and maintain good sleep:  Sleep as long as necessary to feel rested (usually seven to eight hours for adults) and then get out of bed  Maintain a regular sleep schedule, particularly a regular wake-up time in the morning  Avoid smoking or other nicotine intake, particularly during the evening  Avoid daytime naps, especially if they are longer than 20 to 30 minutes or occur late in the day.      Problem List Items Addressed This Visit          GI    Constipation - Primary (Chronic)    Relevant Medications    lubiprostone (AMITIZA) 8 MCG Cap       Other    Insomnia due to medical condition (Chronic)         Health Maintenance Due   Topic Date Due    COVID-19 Vaccine (5 - 2023-24 season) 09/01/2023    Eye Exam  06/29/2024       Follow up if symptoms worsen or fail to improve, for Keep appt as scheduled in Sept with labs prior..        Signature:  MARIYA Gonzalez  [facility]  [address] [facility] [address]      Date of encounter: 5/22/24

## 2024-06-17 ENCOUNTER — OFFICE VISIT (OUTPATIENT)
Dept: RHEUMATOLOGY | Facility: CLINIC | Age: 54
End: 2024-06-17
Payer: COMMERCIAL

## 2024-06-17 ENCOUNTER — LAB VISIT (OUTPATIENT)
Dept: LAB | Facility: HOSPITAL | Age: 54
End: 2024-06-17
Attending: INTERNAL MEDICINE
Payer: COMMERCIAL

## 2024-06-17 VITALS
BODY MASS INDEX: 37.57 KG/M2 | RESPIRATION RATE: 18 BRPM | DIASTOLIC BLOOD PRESSURE: 90 MMHG | TEMPERATURE: 98 F | HEART RATE: 85 BPM | WEIGHT: 239.38 LBS | SYSTOLIC BLOOD PRESSURE: 128 MMHG | HEIGHT: 67 IN | OXYGEN SATURATION: 100 %

## 2024-06-17 DIAGNOSIS — E66.01 CLASS 2 SEVERE OBESITY DUE TO EXCESS CALORIES WITH SERIOUS COMORBIDITY AND BODY MASS INDEX (BMI) OF 37.0 TO 37.9 IN ADULT: Chronic | ICD-10-CM

## 2024-06-17 DIAGNOSIS — R76.8 POSITIVE ANA (ANTINUCLEAR ANTIBODY): Primary | Chronic | ICD-10-CM

## 2024-06-17 DIAGNOSIS — M25.542 JOINT PAIN IN FINGERS OF BOTH HANDS: ICD-10-CM

## 2024-06-17 DIAGNOSIS — M15.9 PRIMARY OSTEOARTHRITIS INVOLVING MULTIPLE JOINTS: ICD-10-CM

## 2024-06-17 DIAGNOSIS — E11.3292 TYPE 2 DIABETES MELLITUS WITH LEFT EYE AFFECTED BY MILD NONPROLIFERATIVE RETINOPATHY WITHOUT MACULAR EDEMA, WITH LONG-TERM CURRENT USE OF INSULIN: Chronic | ICD-10-CM

## 2024-06-17 DIAGNOSIS — I10 PRIMARY HYPERTENSION: Chronic | ICD-10-CM

## 2024-06-17 DIAGNOSIS — M25.541 JOINT PAIN IN FINGERS OF BOTH HANDS: ICD-10-CM

## 2024-06-17 DIAGNOSIS — Z79.4 TYPE 2 DIABETES MELLITUS WITH LEFT EYE AFFECTED BY MILD NONPROLIFERATIVE RETINOPATHY WITHOUT MACULAR EDEMA, WITH LONG-TERM CURRENT USE OF INSULIN: Chronic | ICD-10-CM

## 2024-06-17 DIAGNOSIS — R76.8 POSITIVE ANA (ANTINUCLEAR ANTIBODY): ICD-10-CM

## 2024-06-17 DIAGNOSIS — G47.33 OBSTRUCTIVE SLEEP APNEA SYNDROME: Chronic | ICD-10-CM

## 2024-06-17 LAB
C3 SERPL-MCNC: 203 MG/DL (ref 80–173)
C4 SERPL-MCNC: 40 MG/DL (ref 13–46)
CREAT UR-MCNC: 121 MG/DL (ref 45–106)
CRP SERPL-MCNC: 18 MG/L
ERYTHROCYTE [SEDIMENTATION RATE] IN BLOOD: 23 MM/HR (ref 0–20)
PROT UR STRIP-MCNC: <6.8 MG/DL

## 2024-06-17 PROCEDURE — 3061F NEG MICROALBUMINURIA REV: CPT | Mod: CPTII,,, | Performed by: INTERNAL MEDICINE

## 2024-06-17 PROCEDURE — 86800 THYROGLOBULIN ANTIBODY: CPT

## 2024-06-17 PROCEDURE — 3066F NEPHROPATHY DOC TX: CPT | Mod: CPTII,,, | Performed by: INTERNAL MEDICINE

## 2024-06-17 PROCEDURE — 3074F SYST BP LT 130 MM HG: CPT | Mod: CPTII,,, | Performed by: INTERNAL MEDICINE

## 2024-06-17 PROCEDURE — 86235 NUCLEAR ANTIGEN ANTIBODY: CPT

## 2024-06-17 PROCEDURE — 86376 MICROSOMAL ANTIBODY EACH: CPT

## 2024-06-17 PROCEDURE — 3051F HG A1C>EQUAL 7.0%<8.0%: CPT | Mod: CPTII,,, | Performed by: INTERNAL MEDICINE

## 2024-06-17 PROCEDURE — 99205 OFFICE O/P NEW HI 60 MIN: CPT | Mod: S$PBB,,, | Performed by: INTERNAL MEDICINE

## 2024-06-17 PROCEDURE — 84156 ASSAY OF PROTEIN URINE: CPT

## 2024-06-17 PROCEDURE — 36415 COLL VENOUS BLD VENIPUNCTURE: CPT

## 2024-06-17 PROCEDURE — 86140 C-REACTIVE PROTEIN: CPT

## 2024-06-17 PROCEDURE — 3079F DIAST BP 80-89 MM HG: CPT | Mod: CPTII,,, | Performed by: INTERNAL MEDICINE

## 2024-06-17 PROCEDURE — 99215 OFFICE O/P EST HI 40 MIN: CPT | Mod: PBBFAC | Performed by: INTERNAL MEDICINE

## 2024-06-17 PROCEDURE — 86225 DNA ANTIBODY NATIVE: CPT

## 2024-06-17 PROCEDURE — 86160 COMPLEMENT ANTIGEN: CPT

## 2024-06-17 PROCEDURE — 3008F BODY MASS INDEX DOCD: CPT | Mod: CPTII,,, | Performed by: INTERNAL MEDICINE

## 2024-06-17 PROCEDURE — 86812 HLA TYPING A B OR C: CPT

## 2024-06-17 PROCEDURE — 1159F MED LIST DOCD IN RCRD: CPT | Mod: CPTII,,, | Performed by: INTERNAL MEDICINE

## 2024-06-17 PROCEDURE — 85652 RBC SED RATE AUTOMATED: CPT

## 2024-06-17 NOTE — PROGRESS NOTES
"  Patient ID: [id number]     Chief Complaint: Positive TARA (Patient states she has generalized joint pain and occasional rash to face and arm areas. )      Referred By: Brianna Srinivasan     HPI:     Joyce Marie Boast is a 54 y.o. female here today for a new rheumatology patient visit.      Per chart review, patient was referred to [facility] for further evaluation of experiencing pain joint pains located to the fingers of B/L Hands.     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    Noticed pain/stiffness of hands initially starting around above 2003 that progressively worsened until presentation today. Pain/stiffness first thing in AM pain/stiffness of hands lasting about 30 minutes after taking tizanidine. Has been on tizanidine for past x1 year. Also reports taking steroids x3 occasions over past few years (x1 IM, x2 occasions PO) in which she noticed moderate relief (better than tizanidine).     She reports noticing over the past few years of stiffness also in wrists, lower back, knees, & toes all loosening up after about 30 minutes.    Photosensitivity "itching/burning, I want to pull my skin off my body", developing a rash in the sun within the past x3 years. States these symptoms occur after only a few minutes in the sun. This rash affects any exposed skin area. Also has noticed malar-like rash with nasolabial sparing.    Patient states that she has experienced x1 year ago w/ B/L middle finger hot/swollen/tenderness that has recurred on few occasions when she doesn't take tizanidine.    Takes Diclofenac tablets 75mg BID, since 1/2024, with mild to no relief.    Labs performed previously that resulted negative (7/28/23):  - RF (<5), CCP ab " (0.6), ESR (20)    Labs performed previously that resulted positive:  - CRP (19.3), TARA (1:160, speckled)      Denies history of fevers, oral or nasal ulcers, h/o MI, stroke, seizures, h/o PE or DVT, Raynaud's phenomenon, uveitis, malignancies.     Family History of Autoimmune Disease  Mother: Denies  Father: Denies  Siblings: Denies  Children: Denies  Paternal Grandmother: Arthritis (unspecified)    Family History  Mother: HTN, DM,   Maternal Grandmother: Cancer (unspecified)  Father: Denies  Siblings: Denies  Children: Denies    Ob/Gyn  Pregnancies:  2  Miscarriages:  0    Social History  Etoh: Occasional (x2 occasions/year)  Tobacco: Denies  Illicit Substances: Denies    Smoking:   Social History     Tobacco Use   Smoking Status Never    Passive exposure: Never   Smokeless Tobacco Never          -------------------------------------    Diabetes mellitus, type 2    Hyperlipidemia    Hypertension        Past Surgical History:   Procedure Laterality Date    CARPAL TUNNEL RELEASE Bilateral     TUBAL LIGATION         Review of patient's allergies indicates:  No Known Allergies    Outpatient Medications Marked as Taking for the 6/17/24 encounter (Office Visit) with Estella Burden MD   Medication Sig Dispense Refill    amLODIPine (NORVASC) 5 MG tablet Take 1 tablet (5 mg total) by mouth every evening. 90 tablet 3    blood sugar diagnostic Strp Glucometer Strips, See Instructions, Use to check CBG daily and prn 50 strip 11    diclofenac (VOLTAREN) 75 MG EC tablet Take 1 tablet (75 mg total) by mouth 2 (two) times daily as needed (pain). 45 tablet 3    hydrOXYzine pamoate (VISTARIL) 50 MG Cap Take 1 capsule (50 mg total) by mouth nightly as needed (insomnia). 30 capsule 4    lancets 31 gauge Misc 1 lancet  by Misc.(Non-Drug; Combo Route) route Daily. 50 each 11    lancets OP (IHEALTH) OP   Glucometer Lancets, See Instructions, Use to CBG daily and prn., # 50 EA, 11 Refill(s), Pharmacy: [facility][id number], 202,  cm, Height/Length Dosing, 01/11/22 8:27:00 CST, 117.7, kg, Weight Dosing, 01/11/22 8:27:00 CST      lubiprostone (AMITIZA) 8 MCG Cap Take 1 capsule (8 mcg total) by mouth 2 (two) times daily with meals. 60 capsule 6    metFORMIN (GLUCOPHAGE-XR) 500 MG ER 24hr tablet Take 1 tablet (500 mg total) by mouth daily with breakfast. 90 tablet 3    ONETOUCH ULTRA2 METER Misc daily as needed.      pravastatin (PRAVACHOL) 20 MG tablet Take 1 tablet (20 mg total) by mouth every evening. 90 tablet 3    semaglutide (OZEMPIC) 1 mg/dose (4 mg/3 mL) Inject 1 mg into the skin every 7 days. 3 mL 6    tiZANidine (ZANAFLEX) 4 MG tablet Take 1 tablet (4 mg total) by mouth every 8 (eight) hours as needed (muscle pain). May cause drowsiness 30 tablet 6    TRUEPLUS LANCETS 33 gauge Misc Apply topically.      [DISCONTINUED] aspirin (ECOTRIN) 81 MG EC tablet Take 81 mg by mouth.         Social History     Socioeconomic History    Marital status: Single    Number of children: 2   Tobacco Use    Smoking status: Never     Passive exposure: Never    Smokeless tobacco: Never   Substance and Sexual Activity    Alcohol use: Yes     Comment: wine on occ    Drug use: Never    Sexual activity: Yes     Birth control/protection: See Surgical Hx     Social Determinants of Health     Financial Resource Strain: Low Risk  (1/8/2024)    Overall Financial Resource Strain (CARDIA)     Difficulty of Paying Living Expenses: Not hard at all   Food Insecurity: No Food Insecurity (1/8/2024)    Hunger Vital Sign     Worried About Running Out of Food in the Last Year: Never true     Ran Out of Food in the Last Year: Never true   Transportation Needs: No Transportation Needs (1/8/2024)    PRAPARE - Transportation     Lack of Transportation (Medical): No     Lack of Transportation (Non-Medical): No   Physical Activity: Inactive (1/8/2024)    Exercise Vital Sign     Days of Exercise per Week: 0 days     Minutes of Exercise per Session: 0 min   Stress: Stress Concern  Present (1/8/2024)    [facility] - Occupational Stress Questionnaire     Feeling of Stress : To some extent   Housing Stability: Low Risk  (1/8/2024)    Housing Stability Vital Sign     Unable to Pay for Housing in the Last Year: No     Number of Places Lived in the Last Year: 1     Unstable Housing in the Last Year: No        Family History   Problem Relation Name Age of Onset    Heart disease Mother      Hypertension Mother      Diabetes Mother      Hypertension Father      Cancer Maternal Aunt      Cancer Maternal Grandmother          Immunization History   Administered Date(s) Administered    COVID-19, MRNA, LN-S, PF (Pfizer) (Purple Cap) 03/16/2021, 04/06/2021, 12/30/2021, 05/17/2022    DTP 12/15/1975, 03/12/1976, 05/17/1976, 09/07/1977, 11/26/1979    Hepatitis B, Adult 04/16/2013, 05/16/2013, 12/05/2013    Influenza (FLUBLOK) - Quadrivalent - Recombinant - PF *Preferred* (egg allergy) 10/02/2019    Influenza - Quadrivalent 12/01/2020, 01/11/2022    Influenza - Quadrivalent - PF *Preferred* (6 months and older) 09/21/2022, 01/08/2024    MMR 10/23/2001    Measles / Rubella 12/15/1979    Mumps 11/26/1974    OPV 12/15/1975, 03/12/1976, 05/17/1976, 09/07/1977, 11/26/1979    Pneumococcal Conjugate - 20 Valent 07/24/2023    Td (ADULT) 04/03/1990    Tdap 09/29/2010, 09/15/2020    Zoster Recombinant 01/11/2022, 04/12/2022       Patient Care Team:  Brianna Srinivasan FNP as PCP - General (Nurse Practitioner)     Subjective:     Constitutional:  Denies chills. Denies fever. Admits: night sweats (located to nape of neck; not systemic). Denies weight loss.   Ophthalmology: Denies blurred vision. Denies dry eyes. Denies eye pain. Denies Itching and redness.   ENT: Denies oral ulcers. Denies epistaxis. Denies dry mouth. Denies swollen glands.   Endocrine: Admits: diabetes. Denies thyroid Problems.   Respiratory: Denies cough. Denies shortness of breath. Denies shortness of breath with exertion.  "Denies hemoptysis.   Cardiovascular: Denies chest pain at rest. Denies chest pain with exertion. Denies palpitations.    Gastrointestinal: Denies abdominal pain. Denies diarrhea. Denies nausea. Denies vomiting. Denies hematemesis or hematochezia. Denies heartburn.  Genitourinary: Denies blood in urine.  Musculoskeletal: See HPI for details  Integumentary: Admits: intermittent rash. Admits: photosensitivity.   Peripheral Vascular: Denies Ulcers of hands and/or feet. Denies Cold extremities.   Neurologic: Denies dizziness. Denies headache.  Denies loss of strength. Denies numbness or tingling.   Psychiatric: Denies depression. Denies anxiety. Denies suicidal/homicidal ideations.      Objective:     BP (!) 128/90 (BP Location: Left arm, Patient Position: Sitting, BP Method: Large (Manual))   Pulse 85   Temp 98.1 °F (36.7 °C) (Oral)   Resp 18   Ht 5' 7" (1.702 m)   Wt 108.6 kg (239 lb 6.4 oz)   LMP 04/01/2022 (Approximate)   SpO2 100%   BMI 37.50 kg/m²     Physical Exam    General Appearance: alert, pleasant, obese female, in no acute distress.  Skin: Skin color, texture, turgor normal. No rashes or lesions appreciated on today's exam  Eyes:  extraocular movement intact (EOMI), pupils equal, round, reactive to light and accommodation, conjunctiva clear.  ENT: No oral or nasal ulcers.  Neck:  Neck supple. No adenopathy.   Lungs: CTA throughout without crackles, rhonchi, or wheezes.   Heart: RRR w/o murmurs.  No edema. 2+ DP/TP pulse.  Abdomen: Soft, non-tender  Neuro: Alert, oriented, CN II-XII GI, sensory and motor innervation intact.  Musculoskeletal:   Psych: Alert, oriented, normal eye contact.    Joint Exam:  DIPs, PIPs, MCPs: no pain on palpation, no swelling or redness, no nodules.  Wrists: no pain on palpation, no swelling or redness, good range of motion.  Elbows: no pain on palpation, no swelling or redness, good range of motion, no nodules.  Shoulders: no pain on palpation, no swelling or redness, good " range of motion.  Back/Neck: no pain on palpation.   Hips: no pain on palpation, good range of motion.  Knees: no pain on palpation, no swelling or redness, good range of motion.  Ankles: no pain on palpation, no swelling or redness, good range of motion.  Feet: no pain on palpation, no swelling or redness, no nodules.    Labs:   : Hep B, C, HIV negative.     2023: ESR normal. CCP and RF negative. TARA positive 1:160, speckled pattern.     10/2023: CMP ok.     24: CBC ok. No protein and blood in urine    Imagin/2023: DJD changes in right hand, 1st CMC and IP joints.     Assessment:       ICD-10-CM ICD-9-CM   1. Positive TARA (antinuclear antibody)  R76.8 795.79   2. Joint pain in fingers of both hands  M25.541 719.44    M25.542    3. Class 2 severe obesity due to excess calories with serious comorbidity and body mass index (BMI) of 37.0 to 37.9 in adult  E66.01 278.01    Z68.37 V85.37   4. Type 2 diabetes mellitus with left eye affected by mild nonproliferative retinopathy without macular edema, with long-term current use of insulin  E11.3292 250.50    Z79.4 362.04     V58.67   5. Obstructive sleep apnea syndrome  G47.33 327.23   6. Primary hypertension  I10 401.9   7. Primary osteoarthritis involving multiple joints  M15.9 715.98        Plan:     1. Positive TARA (antinuclear antibody)    2. Joint pain in fingers of both hands    3. Class 2 severe obesity due to excess calories with serious comorbidity and body mass index (BMI) of 37.0 to 37.9 in adult    4. Type 2 diabetes mellitus with left eye affected by mild nonproliferative retinopathy without macular edema, with long-term current use of insulin    5. Obstructive sleep apnea syndrome    6. Primary hypertension    7. Primary osteoarthritis involving multiple joints    - Patient referred to [facility] for further evaluation of labs concerning for elevated TARA (1:160, speckled) in the setting of joint pain/stiffness located to primarily to B/L  hands in addition to intermittent other joints as above & recent reported photosensitive malar-like rash (unfortunately patient unable to provide pictures of rash at today's visit)  - Low concern for Rheumatoid Arthritis w/ presenting symptoms, physical exam findings, & serologies negative for RF or CCP-ab  - Elevated concern for Osteoarthritis w/ symptoms, prior X-ray findings, & negative above serologies  - Recommend cessation of diclofenac PO tablets as currently not experiencing relief & elevated possibility of renal dysfunction and/or GI side effects  - Recommend use of tylenol as needed for hand/foot pain flares instead  - Recommended utilization of topical voltaren gel, applied to B/L hands TID  - Recommended continued utilization of stress ball/hand exercises  - For further evaluation of previously positive TARA; ordered:                -- ESR, CRP, Anti-SSA/SSB, C3/C4, dsDNA Ab, Anti-Scleroderma Ab, Sm/RNP Ab                -- Anti-thyroglobulin Ab, Thyroid Peroxidase Ab                -- Urinalysis, Protein/Cr Ratio  - Discussed importance of diet/exercise & weight loss to assist with current co-morbidities w/ referral to nutritionist in which patient is understanding      Follow up if symptoms worsen or fail to improve. In addition to their scheduled follow up, the patient has also been instructed to follow up on as needed basis.     Mani Smith MD  Internal Medicine PGY-III

## 2024-06-19 LAB
DSDNA IGG SERPL IA-ACNC: <10 IU/ML (ref 0–99)
ENA SS-A IGG SER-ACNC: <0.2 U
ENA SS-B IGG SER-ACNC: <0.2 U
THYROGLOB AB SERPL IA-ACNC: <1.8 IU/ML
THYROPEROXIDASE AB SERPL-ACNC: 1.4 IU/ML

## 2024-06-20 ENCOUNTER — TELEPHONE (OUTPATIENT)
Dept: RHEUMATOLOGY | Facility: CLINIC | Age: 54
End: 2024-06-20
Payer: COMMERCIAL

## 2024-06-20 LAB
ENA SCL70 IGG SER IA-ACNC: 2 AU/ML
ENA SM IGG SER-ACNC: 3 AU/ML
HLA TYP COMM-IMP: NORMAL
HLA-B27 QL FC: NEGATIVE
U1 SNRNP IGG SER-ACNC: 2 UNITS

## 2024-06-20 NOTE — TELEPHONE ENCOUNTER
Please let the patient know that all the specific tests I did for work up of positive TARA came back negative. She does not have lupus now. Likely false positive TARA.     No need for follow up visit with me. Follow up with PCP and can be re referred for new issues in the future.

## 2024-06-24 ENCOUNTER — CLINICAL SUPPORT (OUTPATIENT)
Dept: DIABETES | Facility: CLINIC | Age: 54
End: 2024-06-24
Payer: COMMERCIAL

## 2024-06-24 DIAGNOSIS — E11.3292 TYPE 2 DIABETES MELLITUS WITH LEFT EYE AFFECTED BY MILD NONPROLIFERATIVE RETINOPATHY WITHOUT MACULAR EDEMA, WITH LONG-TERM CURRENT USE OF INSULIN: Chronic | ICD-10-CM

## 2024-06-24 DIAGNOSIS — E66.01 CLASS 2 SEVERE OBESITY DUE TO EXCESS CALORIES WITH SERIOUS COMORBIDITY AND BODY MASS INDEX (BMI) OF 37.0 TO 37.9 IN ADULT: Chronic | ICD-10-CM

## 2024-06-24 DIAGNOSIS — Z79.4 TYPE 2 DIABETES MELLITUS WITH LEFT EYE AFFECTED BY MILD NONPROLIFERATIVE RETINOPATHY WITHOUT MACULAR EDEMA, WITH LONG-TERM CURRENT USE OF INSULIN: Chronic | ICD-10-CM

## 2024-06-24 PROCEDURE — G0108 DIAB MANAGE TRN  PER INDIV: HCPCS | Mod: PBBFAC

## 2024-06-24 PROCEDURE — 99212 OFFICE O/P EST SF 10 MIN: CPT | Mod: PBBFAC

## 2024-07-03 VITALS — WEIGHT: 243.63 LBS | BODY MASS INDEX: 38.24 KG/M2 | HEIGHT: 67 IN

## 2024-07-03 NOTE — PROGRESS NOTES
"Diabetes Care Specialist Progress Note  Author: Margo Wilkins RD  Date: 6/24/2024    Program Intake  Reason for Diabetes Program Visit:: Initial Diabetes Assessment  Current diabetes risk level:: moderate (risk score 1)  In the last 12 months, have you:: none  Continuous Glucose Monitoring  Patient has CGM: No    Lab Results   Component Value Date    HGBA1C 7.0 (H) 3/18/2024       Clinical    Weight: 110.5 kg (243 lb 9.6 oz)   Height: 5' 7" (170.2 cm)   Body mass index is 38.15 kg/m².    Patient Health Rating  Compared to other people your age, how would you rate your health?: Fair    Problem Review  Active comorbidities affecting diabetes self-care.: yes  Comorbidities: Retinopathy    Clinical Assessment  Current Diabetes Treatment: Oral Medication, Injectable (Ozempic 1mg qSunday, metformin 500mg qHS)    Medication Information  Medication adherence impacting ability to self-manage diabetes?: No    Labs  Do you have regular lab work to monitor your medications?: Yes  Type of Regular Lab Work: A1c, Microalbumin, CBC, BMP  Where do you get your labs drawn?: [facility]  Lab Compliance Barriers: No    Nutritional Status  Diet: Regular  Meal Plan 24 Hour Recall: Breakfast, Lunch, Dinner, Snack  Meal Plan 24 Hour Recall - Breakfast: not often, sometimes banana or poptart (1 pack) or Belvita (1 pack) or breakfast sandwich, water  Meal Plan 24 Hour Recall - Lunch: gumbo with 1 svg spoon rice, water  Meal Plan 24 Hour Recall - Dinner: skipped  Meal Plan 24 Hour Recall - Snack: chocolate candy or pineapple popsicle x2 or almonds  Recent Changes in Weight: Weight Gain (#)  Current nutritional status an area of need that is impacting patient's ability to self-manage diabetes?: No    Additional Social History    Support  Does anyone support you with your diabetes care?: yes  Who supports you?: self, spouse, son/daughter, family member (daughter & son, brother)  Does the current support meet the patient's needs?: Yes  Is " Support an area impacting ability to self-manage diabetes?: No    Access to Mass Media & Technology  Media or technology needs impacting ability to self-manage diabetes?: No    Cognitive/Behavioral Health  Alert and Oriented: Yes  Difficulty Thinking: No  Requires Prompting: No  Requires assistance for routine expression?: No  Cognitive or behavioral barriers impacting ability to self-manage diabetes?: No    Culture/Restorationism  Culture or Episcopal beliefs that may impact ability to access healthcare: No    Communication  Language preference: English  Communication needs impacting ability to self-manage diabetes?: No    Health Literacy  Preferred Learning Method: Face to Face, Reading Materials  Health literacy needs impacting ability to self-manage diabetes?: No      Diabetes Self-Management Skills Assessment    Diabetes Disease Process/Treatment Options  Diabetes Disease Process/Treatment Options: Skills Assessment Completed: Yes  Assessment indicates:: Instruction Needed  Area of need?: Yes    Nutrition/Healthy Eating  Method of carbohydrate measurement:: no method  Nutrition/Healthy Eating Skills Assessment Completed:: Yes  Assessment indicates:: Instruction Needed  Area of need?: Yes    Physical Activity/Exercise  Physical Activity/Exercise Skills Assessment Completed: : Yes  Assessment indicates:: Instruction Needed  Area of need?: Yes    Medications  Medication Skills Assessment Completed:: Yes  Assessment indicates:: Instruction Needed  Area of need?: Yes    Home Blood Glucose Monitoring  Patient states that blood sugar is checked at home daily.: no  Monitoring Method:: home glucometer  Home glucometer meter type:: One Touch Ultra 2  How often do you check your blood sugar?: Occasionally  When you check what is your typical blood sugar range? : 130 fasting Saturday, 135 this AM, 105 2h after small honey bun in clinic  Blood glucose logs:: no, encouraged to keep logs, encouraged to bring logs to provider  visits  Blood glucose logs reviewed today?: no  Home Blood Glucose Monitoring Skills Assessment Completed: : Yes  Assessment indicates:: Instruction Needed  Area of need?: Yes    Acute Complications  Acute Complications Skills Assessment Completed: : No  Deffered due to:: Time  Area of need?: Deferred    Chronic Complications  Chronic Complications Skills Assessment Completed: : Yes  Assessment indicates:: Instruction Needed  Area of need?: Yes    Psychosocial/Coping  Psychosocial/Coping Skills Assessment Completed: : No  Deffered due to:: Time  Area of need?: Deferred      Assessment Summary and Plan    Based on today's diabetes care assessment, the following areas of need were identified:          6/24/2024    12:01 AM   Social   Support No   Access to Mass Media/Tech No   Cognitive/Behavioral Health No   Culture/Mormonism No   Communication No   Health Literacy No            6/24/2024    12:01 AM   Clinical   Medication Adherence No   Lab Compliance No   Nutritional Status No            6/24/2024    12:01 AM   Diabetes Self-Management Skills   Diabetes Disease Process/Treatment Options Yes - Discussed type 2 diabetes & pathophysiology.     Nutrition/Healthy Eating Yes - see care plan   Physical Activity/Exercise Yes - Discussed effects of exercise on insulin resistance.   Medication Yes - Reviewed mechanism of action for all Rx'd diabetes medications as well as side effects.  Pt reports constipation with Ozempic.   Home Blood Glucose Monitoring Yes - see care plan   Acute Complications Deferred   Chronic Complications Yes - Discussed chronic complications of uncontrolled diabetes.  Discussed current A1C & A1C goal.  Explained that A1C < 7% decreases risk of complications.    Psychosocial/Coping Deferred          Today's interventions were provided through individual discussion, instruction, and written materials were provided.      Patient verbalized understanding of instruction and written materials.  Pt was able  to return back demonstration of instructions today. Patient understood key points, needs reinforcement and further instruction.     Diabetes Self-Management Care Plan:    Today's Diabetes Self-Management Care Plan was developed with Corinne's input. Corinne has agreed to work toward the following goal(s) to improve his/her overall diabetes control.      Care Plan: Diabetes Management   Updates made since 6/3/2024 12:00 AM        Problem: Healthy Eating         Goal: Patient agrees to use plate method to limit calories & carbs & will aim for </= 400 calories per meal due to desire for wt loss.    Start Date: 6/24/2024   Expected End Date: 7/22/2024   Priority: High   Barriers: No Barriers Identified   Note:    6/24/24: Reviewed sources of carbohydrate & appropriate portion sizes.  Discussed plate method for diabetes as well as carbohydrate counting.  Did not give goals for carbs per meal at this time.  Patient agrees to use plate method to limit calories & carbs & will aim for </= 400 calories per meal due to desire for wt loss.  Pt reports meals are mostly home cooking prepared by family.  Beverages include 6 bottles/d water & 7.5oz can of Sprite 2x/week.  Pt admits that she has a sweet tooth but has not been keeping sweets in the house.  Discussed use of [email or web address] for recipe ideas & sample menus given.         Problem: Blood Glucose Self-Monitoring         Goal: Patient agrees to check and record blood sugars a few times per week.  Will rotate times.    Start Date: 6/24/2024   Expected End Date: 7/22/2024   Barriers: No Barriers Identified   Note:    6/24/24: Pt had not been checking blood sugar for a while but restarted a few days before visit.  Pt agrees to check and record blood sugars a few times per week. Will rotate times.            Follow Up Plan     Follow up in about 4 weeks (around 7/22/2024) for review of BG logs, acute complications, coping, & goal progress.    Today's care plan and follow up  schedule was discussed with patient.  Corinne verbalized understanding of the care plan, goals, and agrees to follow up plan.        The patient was encouraged to communicate with his/her health care provider/physician and care team regarding his/her condition(s) and treatment.  I provided the patient with my contact information today and encouraged to contact me via phone or [facility] Patient Portal as needed.     Length of Visit   Total Time: 90 Minutes

## 2024-07-24 ENCOUNTER — CLINICAL SUPPORT (OUTPATIENT)
Dept: DIABETES | Facility: CLINIC | Age: 54
End: 2024-07-24
Payer: COMMERCIAL

## 2024-07-24 VITALS — WEIGHT: 242.81 LBS | HEIGHT: 67 IN | BODY MASS INDEX: 38.11 KG/M2

## 2024-07-24 DIAGNOSIS — Z79.4 TYPE 2 DIABETES MELLITUS WITH LEFT EYE AFFECTED BY MILD NONPROLIFERATIVE RETINOPATHY WITHOUT MACULAR EDEMA, WITH LONG-TERM CURRENT USE OF INSULIN: Primary | ICD-10-CM

## 2024-07-24 DIAGNOSIS — E11.3292 TYPE 2 DIABETES MELLITUS WITH LEFT EYE AFFECTED BY MILD NONPROLIFERATIVE RETINOPATHY WITHOUT MACULAR EDEMA, WITH LONG-TERM CURRENT USE OF INSULIN: Primary | ICD-10-CM

## 2024-07-24 PROCEDURE — 99212 OFFICE O/P EST SF 10 MIN: CPT | Mod: PBBFAC

## 2024-07-24 PROCEDURE — G0108 DIAB MANAGE TRN  PER INDIV: HCPCS | Mod: PBBFAC

## 2024-07-24 NOTE — PROGRESS NOTES
"Diabetes Care Specialist Follow-up Note  Author: Margo Wilkins RD  Date: 7/24/2024    Intake    Program Intake  Reason for Diabetes Program Visit:: Intervention  Type of Intervention:: Individual  Individual: Education  Education: Self-Management Skill Review  Current diabetes risk level:: moderate (risk score 1)    Current Diabetes Treatment: Oral Medications, DM Injectables  Oral Medication Type/Dose: Metformin 500mg qHS  DM Injectables Type/Dose: Ozempic 1mg qSunday         Lab Results   Component Value Date    HGBA1C 7.0 (H) 3/18/24     A1c Pre Diabetes Care Specialist Intervention:  7% (3/18/24)      Weight: 110.1 kg (242 lb 12.8 oz)   Height: 5' 7" (170.2 cm)   Body mass index is 38.03 kg/m².  Wt stable since last visit on 6/24/24      [facility]:       Lifestyle Coping Support & Clinical    Lifestyle/Coping/Support  Psychosocial/Coping Skills Assessment Completed: : Yes  Assessment indicates:: Adequate understanding  Area of need?: No    Diabetes Self-Management Skills Assessment    Acute Complications  Acute Complications Skills Assessment Completed: : No  Deffered due to:: Time  Area of need?: Deferred      During today's follow-up visit,  the following areas required further assessment and content was provided/reviewed.    Based on today's diabetes care assessment, the following areas of need were identified:          7/24/2024    12:01 AM   Areas of Need   Lifestyle Coping Support No - Pt reports low stress level.  States she finds that she is able to keep stress levels low if she voices her opinions on matters rather than keeping silent.   Acute Complications Deferred        Today's interventions were provided through individual discussion, instruction, and written materials were provided.    Patient verbalized understanding of instruction and written materials.  Pt was able to return back demonstration of instructions today. Patient understood key points, needs reinforcement and further instruction. "     Diabetes Self-Management Care Plan Review and Evaluation of Progress:    During today's follow-up Corinne's Diabetes Self-Management Care Plan progress was reviewed and progress was evaluated including his/her input. Corinne has agreed to continue his/her journey to improve/maintain overall diabetes control by continuing to set health goals. See care plan progress below.      Care Plan: Diabetes Management   Updates made since 6/24/2024 12:00 AM        Problem: Healthy Eating         Goal: Patient agrees to use plate method to limit calories & carbs & will aim for </= 400 calories per meal due to desire for wt loss.    Start Date: 6/24/2024   Expected End Date: 8/26/2024   This Visit's Progress: On track   Priority: High   Barriers: No Barriers Identified   Note:    7/24/24: 24h food recall: 4AM = banana; 8AM = 1 pack poptarts; 6PM = full plate fettucini with chicken & shrimp.  Beverages were water x >/= 5 bottles & Luis A tea.  Pt has not been drinking any sodas since last visit.  Pt does admit that she misunderstood plate method explanation & thought she was supposed to be keep starches to 1/2 plate & nonstarchy vegetables to 1/4 of plate vs the opposite.  Pt will try again & has committed to keeping 1 week of food logs to bring to next visit for review.  Pt will reach out to outpatient dietitian re: nutrition referral placed 6/17 so that she may get into a group class for weight management for additional support.    6/24/24: Reviewed sources of carbohydrate & appropriate portion sizes.  Discussed plate method for diabetes as well as carbohydrate counting.  Did not give goals for carbs per meal at this time.  Patient agrees to use plate method to limit calories & carbs & will aim for </= 400 calories per meal due to desire for wt loss.  Pt reports meals are mostly home cooking prepared by family.  Beverages include 6 bottles/d water & 7.5oz can of Sprite 2x/week.  Pt admits that she has a sweet tooth but has not  been keeping sweets in the house.  Discussed use of [email or web address] for recipe ideas & sample menus given.         Problem: Blood Glucose Self-Monitoring         Goal: Patient agrees to check and record blood sugars a few times per week.  Will rotate times. Completed 7/24/2024   Start Date: 6/24/2024   Expected End Date: 7/22/2024   This Visit's Progress: Met   Priority: Medium   Barriers: No Barriers Identified   Note:    7/24/24: Pt checked blood sugar a total of 18 times over the past month.  Pt did rotate times as discussed.  Fasting BG ranging 126-160, 2h post prandial ranging .    6/24/24: Pt had not been checking blood sugar for a while but restarted a few days before visit.  Pt agrees to check and record blood sugars a few times per week. Will rotate times.        Problem: Physical Activity and Exercise         Goal: Patient agrees to walk 2 miles 2-3 days per week after work.    Start Date: 7/25/2024   Expected End Date: 8/26/2024   Priority: Medium   Barriers: No Barriers Identified   Note:    7/24/24: Pt interested in increasing physical activity to help with insulin resistance and to help promote wt loss.  She plans to start with 2-3 days per week of walking 2 miles after work at ~ 3PM.  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           Follow Up Plan     Follow up in about 1 month (around 8/26/2024) for review of BG/food logs, acute complications, & goal progress.    Today's care plan and follow up schedule was discussed with patient.  Corinne verbalized understanding of the care plan, goals, and agrees to follow up plan.        The patient was encouraged to communicate with his/her health care provider/physician and care team regarding his/her condition(s) and treatment.  I provided the patient with my contact information today and encouraged to contact me via phone or [facility] Patient Portal as needed.     Length of Visit   Total Time: 30 Minutes

## 2024-08-14 DIAGNOSIS — E11.3292 TYPE 2 DIABETES MELLITUS WITH LEFT EYE AFFECTED BY MILD NONPROLIFERATIVE RETINOPATHY WITHOUT MACULAR EDEMA, WITHOUT LONG-TERM CURRENT USE OF INSULIN: Chronic | ICD-10-CM

## 2024-08-14 RX ORDER — METFORMIN HYDROCHLORIDE 500 MG/1
500 TABLET, EXTENDED RELEASE ORAL
Qty: 90 TABLET | Refills: 0 | Status: SHIPPED | OUTPATIENT
Start: 2024-08-14 | End: 2025-08-14

## 2024-09-03 ENCOUNTER — CLINICAL SUPPORT (OUTPATIENT)
Dept: DIABETES | Facility: CLINIC | Age: 54
End: 2024-09-03
Payer: COMMERCIAL

## 2024-09-03 DIAGNOSIS — Z79.4 TYPE 2 DIABETES MELLITUS WITH LEFT EYE AFFECTED BY MILD NONPROLIFERATIVE RETINOPATHY WITHOUT MACULAR EDEMA, WITH LONG-TERM CURRENT USE OF INSULIN: Primary | ICD-10-CM

## 2024-09-03 DIAGNOSIS — E11.3292 TYPE 2 DIABETES MELLITUS WITH LEFT EYE AFFECTED BY MILD NONPROLIFERATIVE RETINOPATHY WITHOUT MACULAR EDEMA, WITH LONG-TERM CURRENT USE OF INSULIN: Primary | ICD-10-CM

## 2024-09-03 PROCEDURE — 99211 OFF/OP EST MAY X REQ PHY/QHP: CPT | Mod: PBBFAC | Performed by: DIETITIAN, REGISTERED

## 2024-09-03 PROCEDURE — G0108 DIAB MANAGE TRN  PER INDIV: HCPCS | Mod: PBBFAC | Performed by: DIETITIAN, REGISTERED

## 2024-09-04 NOTE — PROGRESS NOTES
Diabetes Care Specialist Progress Note  Author: Yenny Logan RD  Date: 9/4/2024    Intake    Program Intake  Reason for Diabetes Program Visit:: Intervention  Type of Intervention:: Individual  Individual: Education  Education: Self-Management Skill Review  Current diabetes risk level:: low    Current Diabetes Treatment: DM Injectables, Oral Medications  Oral Medication Type/Dose: Metformin 500 mg at night  DM Injectables Type/Dose: Ozempic 1 mg Sundays    Continuous Glucose Monitoring  Patient has CGM: No    Lab Results   Component Value Date    HGBA1C 7.6 (H)  7.0 01/08/2024  3/18/2024       Weight: (P) 110.7 kg (244 lb)       Body mass index is 38.22 kg/m² (pended).    Lifestyle Coping Support & Clinical              Diabetes Self-Management Skills Assessment    Medication Skills Assessment  Patient is able to identify current diabetes medications, dosages, and appropriate timing of medications.: yes  Patient reports problems or concerns with current medication regimen.: no  Medication Skills Assessment Completed:: Yes  Assessment indicates:: Instruction Needed  Area of need?: Yes         Nutrition/Healthy Eating  Meal Plan 24 Hour Recall - Breakfast: 2 poptarts or banana and boiled egg  Meal Plan 24 Hour Recall - Dinner: 1 cup rice with mixed vegetables (cauliflower, brocolli and carrots) and meat  Meal Plan 24 Hour Recall - Snack: Belvita  Meal Plan 24 Hour Recall - Beverage: water and 16-32 oz juice daily  Who shops/cooks?: self  Nutrition/Healthy Eating Skills Assessment Completed:: Yes  Assessment indicates:: Instruction Needed  Area of need?: Yes    Physical Activity/Exercise  Patient's daily activity level:: sedentary  Patient formally exercises outside of work.: no  Patient can identify forms of physical activity.: yes  Physical Activity/Exercise Skills Assessment Completed: : Yes  Assessment indicates:: Instruction Needed  Area of need?: Yes    Home Blood Glucose Monitoring  Patient states that blood  sugar is checked at home daily.: no  Monitoring Method:: home glucometer  Home Blood Glucose Monitoring Skills Assessment Completed: : No  Assessment indicates:: Adequate understanding  Area of need?: No    Acute Complications  Acute Complications Skills Assessment Completed: : No  Deffered due to:: Time  Area of need?: Deferred           Assessment Summary and Plan    Based on today's diabetes care assessment, the following areas of need were identified:          9/3/2024    12:01 AM   Areas of Need   Medications/Current Diabetes Treatment Yes   Nutrition/Healthy Eating Yes - see care plan.   Physical Activity/Exercise Yes - see care plan.   Home Blood Glucose Monitoring No   Acute Complications Deferred       Today's interventions were provided through individual discussion, instruction, and written materials were provided.      Patient verbalized understanding of instruction and written materials.  Pt was able to return back demonstration of instructions today. Patient understood key points, needs reinforcement and further instruction.     Diabetes Self-Management Care Plan:    Today's Diabetes Self-Management Care Plan was developed with Corinne's input. Corinne has agreed to work toward the following goal(s) to improve his/her overall diabetes control.      Care Plan: Diabetes Management   Updates made since 8/5/2024 12:00 AM        Problem: Healthy Eating         Goal: Patient agrees to eliminate juice & will aim for less than 2-3 servings of carbs per meal due to desire for wt loss.    Start Date: 6/24/2024   Expected End Date: 3/3/2025   This Visit's Progress: No change   Recent Progress: On track   Priority: High   Barriers: No Barriers Identified   Note:    7/24/24: 24h food recall: 4AM = banana; 8AM = 1 pack poptarts; 6PM = full plate fettucini with chicken & shrimp.  Beverages were water x >/= 5 bottles & Luis A tea.  Pt has not been drinking any sodas since last visit.  Pt does admit that she misunderstood  plate method explanation & thought she was supposed to be keep starches to 1/2 plate & nonstarchy vegetables to 1/4 of plate vs the opposite.  Pt will try again & has committed to keeping 1 week of food logs to bring to next visit for review.  Pt will reach out to outpatient dietitian re: nutrition referral placed 6/17 so that she may get into a group class for weight management for additional support.    6/24/24: Reviewed sources of carbohydrate & appropriate portion sizes.  Discussed plate method for diabetes as well as carbohydrate counting.  Did not give goals for carbs per meal at this time.  Patient agrees to use plate method to limit calories & carbs & will aim for </= 400 calories per meal due to desire for wt loss.  Pt reports meals are mostly home cooking prepared by family.  Beverages include 6 bottles/d water & 7.5oz can of Sprite 2x/week.  Pt admits that she has a sweet tooth but has not been keeping sweets in the house.  Discussed use of [email or web address] for recipe ideas & sample menus given.    9/4/24 - Reports drinking 16-32 oz of juice daily and will eliminate. Reviewed sources and portions of carbs. Likes rice at night and will limit to 1 cup.       Task: Reviewed the sources and role of Carbohydrate, Protein, and Fat and how each nutrient impacts blood sugar. Completed 9/4/2024        Task: Provided visual examples using dry measuring cups, food models, and other familiar objects such as computer mouse, deck or cards, tennis ball etc. to help with visualization of portions. Completed 9/4/2024        Task: Explained how to count carbohydrates using the food label and the use of dry measuring cups for accurate carb counting. Completed 9/4/2024        Task: Recommended replacing beverages containing high sugar content with noncaloric/sugar free options and/or water. Completed 9/4/2024        Task: Review the importance of balancing carbohydrates with each meal using portion control techniques to  count servings of carbohydrate and label reading to identify serving size and amount of total carbs per serving. Completed 9/4/2024        Task: Provided Sample plate method and reviewed the use of the plate to estimate amounts of carbohydrate per meal. Completed 9/4/2024        Problem: Physical Activity and Exercise         Goal: Patient agrees to walk 2 miles 2-3 days per week after work.    Start Date: 7/25/2024   Expected End Date: 3/3/2025   This Visit's Progress: No change   Priority: Medium   Barriers: No Barriers Identified   Note:    7/24/24: Pt interested in increasing physical activity to help with insulin resistance and to help promote wt loss.  She plans to start with 2-3 days per week of walking 2 miles after work at ~ 3PM.  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       Task: Discussed role of physical activity on reducing insulin resistance and improvement in overall glycemic control. Completed 9/4/2024        Task: Discussed role of physical activity as it relates to weight loss Completed 9/4/2024        Task: Offered suggestions on how patient could increase their regular physical activity Completed 9/4/2024          Follow Up Plan     Follow up in about 6 months (around 3/3/2025).    Today's care plan and follow up schedule was discussed with patient.  Corinne verbalized understanding of the care plan, goals, and agrees to follow up plan.        The patient was encouraged to communicate with his/her health care provider/physician and care team regarding his/her condition(s) and treatment.  I provided the patient with my contact information today and encouraged to contact me via phone or [facility] Patient Portal as needed.     Length of Visit   Total Time: 60 Minutes

## 2024-09-20 ENCOUNTER — PATIENT MESSAGE (OUTPATIENT)
Dept: NUTRITION | Facility: HOSPITAL | Age: 54
End: 2024-09-20
Payer: COMMERCIAL

## 2024-09-25 NOTE — PROGRESS NOTES
Daniela Wick, MARIYA   [facility]  [address] [facility] [address]      PATIENT NAME: Joyce Marie Boast  : 1970  DATE: 24  MRN: [id number]      Reason for Visit / Chief Complaint: Follow-up (Labs completed )       History of Present Illness / Problem Focused Workflow     Joyce Marie Boast presents to the clinic with Follow-up (Labs completed )     54 y.o.  female presents to clinic. Medical problems include HTN, constipation, AUB, DM, Positive TARA, DANA on CPAP, liposuction of abd (3/28/22) and back (22), bilateral CTR and obesity. Followed by [facility] GYN and rheum clinics and [facility].      24  DM and HTN follow up with labs. A1c at goal 6.2. Lipids above goal, reports compliance with pravastatin. Agreeable to increase dose to 40mg and follow up virtual in 8 weeks. She is complaining of sensitivity to the sun, reports generalized stinging and itching to exposed skin when in the sun for months now. Discussed that some medications can cause photosenstivity and if this worsens with increase in pravastatin dose to RTC sooner. Encouraged sun protection and adequate hydration. BP at goal, meds refilled. Reporting she is unable to sleep with her current CPAP mask which has led to noncompliance, reviewed risk of uncontrolled DANA, she is agreeable to sleep lab referral for assessment and adjustment. Denies acute complaints.           Review of Systems     Review of Systems   Constitutional:  Negative for fatigue, fever and unexpected weight change.   HENT:  Negative for ear pain, hearing loss, trouble swallowing and voice change.    Respiratory:  Negative for cough and shortness of breath.    Cardiovascular:  Negative for chest pain and palpitations.   Gastrointestinal:  Negative for abdominal pain, diarrhea and vomiting.   Genitourinary:  Negative for dysuria.   Musculoskeletal:  Negative for gait problem.    Skin:  Negative for rash and wound.   Neurological:  Negative for weakness.   Psychiatric/Behavioral:  Negative for suicidal ideas.          Medications and Allergies     Medications  Medication List with Changes/Refills   New Medications    SEMAGLUTIDE (OZEMPIC) 2 MG/DOSE (8 MG/3 ML) PNIJ    Inject 2 mg into the skin every 7 days.   Current Medications    BLOOD SUGAR DIAGNOSTIC STRP    Glucometer Strips, See Instructions, Use to check CBG daily and prn    DICLOFENAC (VOLTAREN) 75 MG EC TABLET    Take 1 tablet (75 mg total) by mouth 2 (two) times daily as needed (pain).    LANCETS 31 GAUGE MISC    1 lancet  by Misc.(Non-Drug; Combo Route) route Daily.    LANCETS OP (IHEALTH) OP      Glucometer Lancets, See Instructions, Use to CBG daily and prn., # 50 EA, 11 Refill(s), Pharmacy: [id number], 170, cm, Height/Length Dosing, 01/11/22 8:27:00 CST, 117.7, kg, Weight Dosing, 01/11/22 8:27:00 CST    ONETOUCH ULTRA2 METER MISC    daily as needed.    TRUEPLUS LANCETS 33 GAUGE MISC    Apply topically.   Changed and/or Refilled Medications    Modified Medication Previous Medication    AMLODIPINE (NORVASC) 5 MG TABLET amLODIPine (NORVASC) 5 MG tablet       Take 1 tablet (5 mg total) by mouth every evening.    Take 1 tablet (5 mg total) by mouth every evening.    HYDROXYZINE PAMOATE (VISTARIL) 50 MG CAP hydrOXYzine pamoate (VISTARIL) 50 MG Cap       Take 1 capsule (50 mg total) by mouth nightly as needed (insomnia).    Take 1 capsule (50 mg total) by mouth nightly as needed (insomnia).    LUBIPROSTONE (AMITIZA) 8 MCG CAP lubiprostone (AMITIZA) 8 MCG Cap       Take 1 capsule (8 mcg total) by mouth 2 (two) times daily with meals.    Take 1 capsule (8 mcg total) by mouth 2 (two) times daily with meals.    METFORMIN (GLUCOPHAGE-XR) 500 MG ER 24HR TABLET metFORMIN (GLUCOPHAGE-XR) 500 MG ER 24hr tablet       Take 1 tablet (500 mg total) by mouth daily with breakfast.    Take 1 tablet (500 mg total) by mouth daily  with breakfast.    PRAVASTATIN (PRAVACHOL) 40 MG TABLET pravastatin (PRAVACHOL) 20 MG tablet       Take 1 tablet (40 mg total) by mouth every evening.    Take 1 tablet (20 mg total) by mouth every evening.    TIZANIDINE (ZANAFLEX) 4 MG TABLET tiZANidine (ZANAFLEX) 4 MG tablet       Take 1 tablet (4 mg total) by mouth every 8 (eight) hours as needed (muscle pain). May cause drowsiness    Take 1 tablet (4 mg total) by mouth every 8 (eight) hours as needed (muscle pain). May cause drowsiness   Discontinued Medications    SEMAGLUTIDE (OZEMPIC) 1 MG/DOSE (4 MG/3 ML)    Inject 1 mg into the skin every 7 days.         Allergies  Review of patient's allergies indicates:  No Known Allergies    Physical Examination     Vitals:    09/30/24 1411   BP: 124/88   Pulse: 74   Resp: 16   Temp: 98 °F (36.7 °C)     Physical Exam  Constitutional:       Appearance: Normal appearance.   Cardiovascular:      Rate and Rhythm: Normal rate and regular rhythm.      Pulses:           Dorsalis pedis pulses are 2+ on the right side and 2+ on the left side.        Posterior tibial pulses are 2+ on the right side and 2+ on the left side.   Pulmonary:      Effort: Pulmonary effort is normal.      Breath sounds: Normal breath sounds.   Musculoskeletal:         General: Normal range of motion.      Cervical back: Normal range of motion.   Feet:      Right foot:      Protective Sensation: 9 sites tested.  9 sites sensed.      Skin integrity: Skin integrity normal.      Toenail Condition: Right toenails are normal.      Left foot:      Protective Sensation: 9 sites tested.  9 sites sensed.      Skin integrity: Skin integrity normal.      Toenail Condition: Left toenails are normal.   Skin:     General: Skin is warm and dry.   Neurological:      General: No focal deficit present.      Mental Status: She is alert and oriented to person, place, and time.   Psychiatric:         Mood and Affect: Mood normal.           Results     Lab Results   Component  Value Date    WBC 9.58 09/27/2024    RBC 5.16 09/27/2024    HGB 14.3 09/27/2024    HCT 42.9 09/27/2024    MCV 83.1 09/27/2024    MCH 27.7 09/27/2024    MCHC 33.3 09/27/2024    RDW 13.5 09/27/2024     09/27/2024    MPV 10.8 (H) 09/27/2024     Sodium   Date Value Ref Range Status   09/27/2024 138 136 - 145 mmol/L Final     Potassium   Date Value Ref Range Status   09/27/2024 4.1 3.5 - 5.1 mmol/L Final     Chloride   Date Value Ref Range Status   09/27/2024 109 (H) 98 - 107 mmol/L Final     CO2   Date Value Ref Range Status   09/27/2024 23 22 - 29 mmol/L Final     Blood Urea Nitrogen   Date Value Ref Range Status   09/27/2024 13.3 9.8 - 20.1 mg/dL Final     Creatinine   Date Value Ref Range Status   09/27/2024 0.92 0.55 - 1.02 mg/dL Final     Calcium   Date Value Ref Range Status   09/27/2024 9.5 8.4 - 10.2 mg/dL Final     Albumin   Date Value Ref Range Status   09/27/2024 3.6 3.5 - 5.0 g/dL Final     Bilirubin Total   Date Value Ref Range Status   09/27/2024 0.4 <=1.5 mg/dL Final     ALP   Date Value Ref Range Status   09/27/2024 91 40 - 150 unit/L Final     AST   Date Value Ref Range Status   09/27/2024 21 5 - 34 unit/L Final     ALT   Date Value Ref Range Status   09/27/2024 20 0 - 55 unit/L Final     Estimated GFR-Non    Date Value Ref Range Status   01/11/2022 80 (L) >>=90 mL/min/1.73 m2 Final     Lab Results   Component Value Date    CHOL 238 (H) 09/27/2024     Lab Results   Component Value Date    HDL 50 09/27/2024     Lab Results   Component Value Date    TRIG 151 (H) 09/27/2024     Lab Results   Component Value Date    VLDL 30 09/27/2024     Lab Results   Component Value Date    .00 (H) 09/27/2024     Lab Results   Component Value Date    TSH 1.698 09/27/2024     Lab Results   Component Value Date    PHUR 5.5 01/08/2024    PROTEINUA Negative 01/08/2024    GLUCUA Normal 01/08/2024    KETONESU Negative 10/01/2021    OCCULTUA Negative 01/08/2024    NITRITE Negative 01/08/2024     LEUKOCYTESUR Negative 01/08/2024     Lab Results   Component Value Date    HGBA1C 6.2 09/27/2024    HGBA1C 7.6 (H) 01/08/2024    HGBA1C 6.5 10/02/2023           Assessment         ICD-10-CM ICD-9-CM   1. Type 2 diabetes mellitus with left eye affected by mild nonproliferative retinopathy without macular edema, without long-term current use of insulin  E11.3292 250.50     362.04   2. Influenza vaccine needed  Z23 V04.81   3. Chronic idiopathic constipation  K59.04 564.00   4. DANA (obstructive sleep apnea)  G47.33 327.23   5. Primary hypertension  I10 401.9   6. Insomnia due to medical condition  G47.01 327.01   7. Mixed hyperlipidemia  E78.2 272.2   8. Obstructive sleep apnea syndrome  G47.33 327.23       Plan      Problem List Items Addressed This Visit          Cardiac/Vascular    Hypertension (Chronic)    Current Assessment & Plan     At goal- continue amlodipine   Low Sodium Diet (DASH Diet - Less than 2 grams of sodium per day).  Monitor blood pressure daily and log. Report consistent numbers greater than 140/90.  Maintain healthy weight with goal BMI <30. Exercise 30 minutes per day, 5 days per week.         Relevant Medications    amLODIPine (NORVASC) 5 MG tablet       Endocrine    Diabetes mellitus - Primary (Chronic)    Current Assessment & Plan     A1C at goal 6.2  Medications- continue metformin and inc ozempic to 2 mg  Follow ADA Diet. Avoid soda, simple sweets, and limit rice/pasta/breads/starches (no more than 45-50 grams per meal).  Maintain healthy weight with goal BMI <30.  Exercise 5 times per week for 30 minutes per day.  Stressed importance of daily foot exams.  Stressed importance of annual dilated eye exam.         Relevant Medications    pravastatin (PRAVACHOL) 40 MG tablet    metFORMIN (GLUCOPHAGE-XR) 500 MG ER 24hr tablet    semaglutide (OZEMPIC) 2 mg/dose (8 mg/3 mL) PnIj    Other Relevant Orders    Diabetic Eye Screening Photo (Completed)       GI    Constipation (Chronic)    Relevant  Medications    lubiprostone (AMITIZA) 8 MCG Cap       Other    Obstructive sleep apnea syndrome (Chronic)    Current Assessment & Plan     Referral to sleep lab for cpap eval  Reinforced risk of CPAP noncompliance  Expect lifetime use and decreased daytime sleepiness/fatigue.   Avoid excessive alcohol, smoking and overuse of sedatives at bedtime.  Weight management discussed.  Adjust sleep position as needed for increased comfort.          Insomnia due to medical condition (Chronic)    Relevant Medications    hydrOXYzine pamoate (VISTARIL) 50 MG Cap     Other Visit Diagnoses       Influenza vaccine needed        Relevant Medications    influenza (Flulaval, Fluzone, Fluarix) 45 mcg/0.5 mL IM vaccine (> or = 6 mo) 0.5 mL (Completed)    DANA (obstructive sleep apnea)        Relevant Orders    Ambulatory referral/consult to Sleep Disorders    Mixed hyperlipidemia        Relevant Orders    Lipid Panel    Comprehensive Metabolic Panel            Future Appointments   Date Time Provider Department Center   10/7/2024 10:10 AM Fatuma Laguna, JAROD [facility] GYN [facility]   11/22/2024 10:00 AM Daniela Wick, FNP [facility]   3/4/2025  3:00 PM Yenny Logan RD [facility]            Signature:      [facility]  [address] [facility] [address]    Date of encounter: 9/30/24

## 2024-09-27 ENCOUNTER — LAB VISIT (OUTPATIENT)
Dept: LAB | Facility: HOSPITAL | Age: 54
End: 2024-09-27
Attending: NURSE PRACTITIONER
Payer: COMMERCIAL

## 2024-09-27 DIAGNOSIS — I10 PRIMARY HYPERTENSION: Chronic | ICD-10-CM

## 2024-09-27 DIAGNOSIS — E11.3292 TYPE 2 DIABETES MELLITUS WITH LEFT EYE AFFECTED BY MILD NONPROLIFERATIVE RETINOPATHY WITHOUT MACULAR EDEMA, WITHOUT LONG-TERM CURRENT USE OF INSULIN: ICD-10-CM

## 2024-09-27 LAB
ALBUMIN SERPL-MCNC: 3.6 G/DL (ref 3.5–5)
ALBUMIN/GLOB SERPL: 0.9 RATIO (ref 1.1–2)
ALP SERPL-CCNC: 91 UNIT/L (ref 40–150)
ALT SERPL-CCNC: 20 UNIT/L (ref 0–55)
ANION GAP SERPL CALC-SCNC: 6 MEQ/L
AST SERPL-CCNC: 21 UNIT/L (ref 5–34)
BASOPHILS # BLD AUTO: 0.02 X10(3)/MCL
BASOPHILS NFR BLD AUTO: 0.2 %
BILIRUB SERPL-MCNC: 0.4 MG/DL
BUN SERPL-MCNC: 13.3 MG/DL (ref 9.8–20.1)
CALCIUM SERPL-MCNC: 9.5 MG/DL (ref 8.4–10.2)
CHLORIDE SERPL-SCNC: 109 MMOL/L (ref 98–107)
CHOLEST SERPL-MCNC: 238 MG/DL
CHOLEST/HDLC SERPL: 5 {RATIO} (ref 0–5)
CO2 SERPL-SCNC: 23 MMOL/L (ref 22–29)
CREAT SERPL-MCNC: 0.92 MG/DL (ref 0.55–1.02)
CREAT/UREA NIT SERPL: 14
EOSINOPHIL # BLD AUTO: 0.15 X10(3)/MCL (ref 0–0.9)
EOSINOPHIL NFR BLD AUTO: 1.6 %
ERYTHROCYTE [DISTWIDTH] IN BLOOD BY AUTOMATED COUNT: 13.5 % (ref 11.5–17)
EST. AVERAGE GLUCOSE BLD GHB EST-MCNC: 131.2 MG/DL
GFR SERPLBLD CREATININE-BSD FMLA CKD-EPI: >60 ML/MIN/1.73/M2
GLOBULIN SER-MCNC: 4 GM/DL (ref 2.4–3.5)
GLUCOSE SERPL-MCNC: 138 MG/DL (ref 74–100)
HBA1C MFR BLD: 6.2 %
HCT VFR BLD AUTO: 42.9 % (ref 37–47)
HDLC SERPL-MCNC: 50 MG/DL (ref 35–60)
HGB BLD-MCNC: 14.3 G/DL (ref 12–16)
IMM GRANULOCYTES # BLD AUTO: 0.02 X10(3)/MCL (ref 0–0.04)
IMM GRANULOCYTES NFR BLD AUTO: 0.2 %
LDLC SERPL CALC-MCNC: 158 MG/DL (ref 50–140)
LYMPHOCYTES # BLD AUTO: 3.97 X10(3)/MCL (ref 0.6–4.6)
LYMPHOCYTES NFR BLD AUTO: 41.4 %
MCH RBC QN AUTO: 27.7 PG (ref 27–31)
MCHC RBC AUTO-ENTMCNC: 33.3 G/DL (ref 33–36)
MCV RBC AUTO: 83.1 FL (ref 80–94)
MONOCYTES # BLD AUTO: 0.56 X10(3)/MCL (ref 0.1–1.3)
MONOCYTES NFR BLD AUTO: 5.8 %
NEUTROPHILS # BLD AUTO: 4.86 X10(3)/MCL (ref 2.1–9.2)
NEUTROPHILS NFR BLD AUTO: 50.8 %
NRBC BLD AUTO-RTO: 0 %
PLATELET # BLD AUTO: 227 X10(3)/MCL (ref 130–400)
PMV BLD AUTO: 10.8 FL (ref 7.4–10.4)
POTASSIUM SERPL-SCNC: 4.1 MMOL/L (ref 3.5–5.1)
PROT SERPL-MCNC: 7.6 GM/DL (ref 6.4–8.3)
RBC # BLD AUTO: 5.16 X10(6)/MCL (ref 4.2–5.4)
SODIUM SERPL-SCNC: 138 MMOL/L (ref 136–145)
TRIGL SERPL-MCNC: 151 MG/DL (ref 37–140)
TSH SERPL-ACNC: 1.7 UIU/ML (ref 0.35–4.94)
VLDLC SERPL CALC-MCNC: 30 MG/DL
WBC # BLD AUTO: 9.58 X10(3)/MCL (ref 4.5–11.5)

## 2024-09-27 PROCEDURE — 83036 HEMOGLOBIN GLYCOSYLATED A1C: CPT

## 2024-09-27 PROCEDURE — 85025 COMPLETE CBC W/AUTO DIFF WBC: CPT

## 2024-09-27 PROCEDURE — 84443 ASSAY THYROID STIM HORMONE: CPT

## 2024-09-27 PROCEDURE — 80061 LIPID PANEL: CPT

## 2024-09-27 PROCEDURE — 36415 COLL VENOUS BLD VENIPUNCTURE: CPT

## 2024-09-27 PROCEDURE — 80053 COMPREHEN METABOLIC PANEL: CPT

## 2024-09-30 ENCOUNTER — CLINICAL SUPPORT (OUTPATIENT)
Dept: INTERNAL MEDICINE | Facility: CLINIC | Age: 54
End: 2024-09-30
Payer: COMMERCIAL

## 2024-09-30 ENCOUNTER — OFFICE VISIT (OUTPATIENT)
Dept: INTERNAL MEDICINE | Facility: CLINIC | Age: 54
End: 2024-09-30
Payer: COMMERCIAL

## 2024-09-30 VITALS
SYSTOLIC BLOOD PRESSURE: 124 MMHG | HEART RATE: 74 BPM | BODY MASS INDEX: 38.04 KG/M2 | TEMPERATURE: 98 F | DIASTOLIC BLOOD PRESSURE: 88 MMHG | WEIGHT: 242.38 LBS | HEIGHT: 67 IN | RESPIRATION RATE: 16 BRPM

## 2024-09-30 DIAGNOSIS — G47.33 OSA (OBSTRUCTIVE SLEEP APNEA): ICD-10-CM

## 2024-09-30 DIAGNOSIS — E11.3292 TYPE 2 DIABETES MELLITUS WITH LEFT EYE AFFECTED BY MILD NONPROLIFERATIVE RETINOPATHY WITHOUT MACULAR EDEMA, WITHOUT LONG-TERM CURRENT USE OF INSULIN: Primary | Chronic | ICD-10-CM

## 2024-09-30 DIAGNOSIS — K59.04 CHRONIC IDIOPATHIC CONSTIPATION: Chronic | ICD-10-CM

## 2024-09-30 DIAGNOSIS — I10 PRIMARY HYPERTENSION: Chronic | ICD-10-CM

## 2024-09-30 DIAGNOSIS — E78.2 MIXED HYPERLIPIDEMIA: ICD-10-CM

## 2024-09-30 DIAGNOSIS — Z23 INFLUENZA VACCINE NEEDED: ICD-10-CM

## 2024-09-30 DIAGNOSIS — E11.3292 TYPE 2 DIABETES MELLITUS WITH LEFT EYE AFFECTED BY MILD NONPROLIFERATIVE RETINOPATHY WITHOUT MACULAR EDEMA, WITHOUT LONG-TERM CURRENT USE OF INSULIN: Chronic | ICD-10-CM

## 2024-09-30 DIAGNOSIS — G47.01 INSOMNIA DUE TO MEDICAL CONDITION: Chronic | ICD-10-CM

## 2024-09-30 DIAGNOSIS — G47.33 OBSTRUCTIVE SLEEP APNEA SYNDROME: Chronic | ICD-10-CM

## 2024-09-30 PROCEDURE — 90656 IIV3 VACC NO PRSV 0.5 ML IM: CPT | Mod: PBBFAC

## 2024-09-30 PROCEDURE — 90471 IMMUNIZATION ADMIN: CPT | Mod: PBBFAC

## 2024-09-30 PROCEDURE — 92228 IMG RTA DETC/MNTR DS PHY/QHP: CPT | Mod: TC,PBBFAC

## 2024-09-30 PROCEDURE — 92228 IMG RTA DETC/MNTR DS PHY/QHP: CPT | Mod: PBBFAC

## 2024-09-30 PROCEDURE — 99215 OFFICE O/P EST HI 40 MIN: CPT | Mod: PBBFAC,25

## 2024-09-30 RX ORDER — LUBIPROSTONE 8 UG/1
8 CAPSULE ORAL 2 TIMES DAILY WITH MEALS
Qty: 60 CAPSULE | Refills: 6 | Status: SHIPPED | OUTPATIENT
Start: 2024-09-30

## 2024-09-30 RX ORDER — PRAVASTATIN SODIUM 40 MG/1
40 TABLET ORAL NIGHTLY
Qty: 90 TABLET | Refills: 2 | Status: SHIPPED | OUTPATIENT
Start: 2024-09-30 | End: 2025-06-27

## 2024-09-30 RX ORDER — TIZANIDINE 4 MG/1
4 TABLET ORAL EVERY 8 HOURS PRN
Qty: 30 TABLET | Refills: 6 | Status: SHIPPED | OUTPATIENT
Start: 2024-09-30

## 2024-09-30 RX ORDER — HYDROXYZINE PAMOATE 50 MG/1
50 CAPSULE ORAL NIGHTLY PRN
Qty: 30 CAPSULE | Refills: 4 | Status: SHIPPED | OUTPATIENT
Start: 2024-09-30

## 2024-09-30 RX ORDER — METFORMIN HYDROCHLORIDE 500 MG/1
500 TABLET, EXTENDED RELEASE ORAL
Qty: 90 TABLET | Refills: 0 | Status: SHIPPED | OUTPATIENT
Start: 2024-09-30 | End: 2025-09-30

## 2024-09-30 RX ORDER — AMLODIPINE BESYLATE 5 MG/1
5 TABLET ORAL NIGHTLY
Qty: 90 TABLET | Refills: 3 | Status: SHIPPED | OUTPATIENT
Start: 2024-09-30

## 2024-09-30 RX ADMIN — INFLUENZA VIRUS VACCINE 0.5 ML: 15; 15; 15 SUSPENSION INTRAMUSCULAR at 02:09

## 2024-09-30 NOTE — PROGRESS NOTES
Joyce Marie Boast is a 54 y.o. female here for a diabetic eye screening with non-dilated fundus photos per MARIYA Padilla.    Patient cooperative?: Yes  Small pupils?: No  Last eye exam: Unknown    For exam results, see Encounter Report.

## 2024-09-30 NOTE — ASSESSMENT & PLAN NOTE
A1C at goal 6.2  Medications- continue metformin and inc ozempic to 2 mg  Follow ADA Diet. Avoid soda, simple sweets, and limit rice/pasta/breads/starches (no more than 45-50 grams per meal).  Maintain healthy weight with goal BMI <30.  Exercise 5 times per week for 30 minutes per day.  Stressed importance of daily foot exams.  Stressed importance of annual dilated eye exam.

## 2024-09-30 NOTE — ASSESSMENT & PLAN NOTE
At goal- continue amlodipine   Low Sodium Diet (DASH Diet - Less than 2 grams of sodium per day).  Monitor blood pressure daily and log. Report consistent numbers greater than 140/90.  Maintain healthy weight with goal BMI <30. Exercise 30 minutes per day, 5 days per week.

## 2024-09-30 NOTE — ASSESSMENT & PLAN NOTE
Referral to sleep lab for cpap eval  Reinforced risk of CPAP noncompliance  Expect lifetime use and decreased daytime sleepiness/fatigue.   Avoid excessive alcohol, smoking and overuse of sedatives at bedtime.  Weight management discussed.  Adjust sleep position as needed for increased comfort.

## 2024-10-03 ENCOUNTER — TELEPHONE (OUTPATIENT)
Dept: INTERNAL MEDICINE | Facility: CLINIC | Age: 54
End: 2024-10-03

## 2024-10-07 ENCOUNTER — OFFICE VISIT (OUTPATIENT)
Dept: GYNECOLOGY | Facility: CLINIC | Age: 54
End: 2024-10-07
Payer: COMMERCIAL

## 2024-10-07 VITALS
DIASTOLIC BLOOD PRESSURE: 82 MMHG | TEMPERATURE: 99 F | HEIGHT: 67 IN | WEIGHT: 243 LBS | RESPIRATION RATE: 20 BRPM | BODY MASS INDEX: 38.14 KG/M2 | SYSTOLIC BLOOD PRESSURE: 119 MMHG | OXYGEN SATURATION: 100 % | HEART RATE: 85 BPM

## 2024-10-07 DIAGNOSIS — Z12.31 VISIT FOR SCREENING MAMMOGRAM: ICD-10-CM

## 2024-10-07 DIAGNOSIS — Z12.4 PAP SMEAR FOR CERVICAL CANCER SCREENING: Primary | ICD-10-CM

## 2024-10-07 PROCEDURE — 3079F DIAST BP 80-89 MM HG: CPT | Mod: CPTII,,, | Performed by: NURSE PRACTITIONER

## 2024-10-07 PROCEDURE — 3074F SYST BP LT 130 MM HG: CPT | Mod: CPTII,,, | Performed by: NURSE PRACTITIONER

## 2024-10-07 PROCEDURE — 87624 HPV HI-RISK TYP POOLED RSLT: CPT | Performed by: NURSE PRACTITIONER

## 2024-10-07 PROCEDURE — 88174 CYTOPATH C/V AUTO IN FLUID: CPT | Performed by: NURSE PRACTITIONER

## 2024-10-07 PROCEDURE — 99214 OFFICE O/P EST MOD 30 MIN: CPT | Mod: PBBFAC | Performed by: NURSE PRACTITIONER

## 2024-10-07 PROCEDURE — 3008F BODY MASS INDEX DOCD: CPT | Mod: CPTII,,, | Performed by: NURSE PRACTITIONER

## 2024-10-07 PROCEDURE — 3066F NEPHROPATHY DOC TX: CPT | Mod: CPTII,,, | Performed by: NURSE PRACTITIONER

## 2024-10-07 PROCEDURE — 3061F NEG MICROALBUMINURIA REV: CPT | Mod: CPTII,,, | Performed by: NURSE PRACTITIONER

## 2024-10-07 PROCEDURE — 1159F MED LIST DOCD IN RCRD: CPT | Mod: CPTII,,, | Performed by: NURSE PRACTITIONER

## 2024-10-07 PROCEDURE — 3044F HG A1C LEVEL LT 7.0%: CPT | Mod: CPTII,,, | Performed by: NURSE PRACTITIONER

## 2024-10-07 PROCEDURE — 99396 PREV VISIT EST AGE 40-64: CPT | Mod: S$PBB,,, | Performed by: NURSE PRACTITIONER

## 2024-10-07 NOTE — PROGRESS NOTES
"  [facility] -  Gynecology / Women's Health Clinic      Subjective:       Patient ID: Joyce Marie Boast is a 54 y.o. female.    Chief Complaint:  Gynecologic Exam    History of Present Illness  The patient  here for annual exam. LMP was 2023. Hx of  AUB-O with GYN in . Denies history of abnormal paps. Last pap -NIL and HPV neg. MG-24 & BIRADS 1. Denies breast or urinary complaints. Denies pelvic pain, abnormal bleeding or discharge. Pt reports no STIs in the past, recent STI screening was negative. Contraception consist of TL. Denies tobacco use. Dep. screening 0. Denies fly hx of breast, uterine or colon cancer. MGM with ovarian cancer.     GYN & OB History  Patient's last menstrual period was 2022 (approximate).   Date of Last Pap: 2021    Review of patient's allergies indicates:  No Known Allergies  Past Medical History:   Diagnosis Date    Diabetes mellitus, type 2     Hyperlipidemia     Hypertension      OB History    Para Term  AB Living   2 2       2   SAB IAB Ectopic Multiple Live Births                  # Outcome Date GA Lbr Akira/2nd Weight Sex Type Anes PTL Lv   2 Para            1 Para                 Review of Systems  Review of Systems    Negative except for pertinent findings for positives per HPI     Objective:    Physical Exam    /82 (BP Location: Left arm, Patient Position: Sitting)   Pulse 85   Temp 98.8 °F (37.1 °C) (Oral)   Resp 20   Ht 5' 7" (1.702 m)   Wt 110.2 kg (243 lb)   LMP 2022 (Approximate)   SpO2 100%   BMI 38.06 kg/m²   GENERAL: Well-developed female. No acute distress.    SKIN: Normal to inspection, warm and intact.  BREASTS: No rashes or erythema. No masses, lumps, discharge, tenderness.  VULVA: General appearance normal; external genitalia with no lesions or erythema.  VAGINA: Mucosa/vaginal vault atrophic,no abnormal discharge or lesions.  CERVIX: atrophic, parous appearing os, no erythema or " abnormal discharge.  BIMANUAL EXAM: reveals a 12 week-sized uterus. The uterus is non tender. Toby adnexa reveal no tenderness.  PSYCHIATRIC: Patient is oriented to person, place, and time. Mood and affect are normal.      Assessment:         ICD-10-CM ICD-9-CM   1. Pap smear for cervical cancer screening  Z12.4 V76.2   2. Visit for screening mammogram  Z12.31 V76.12     Plan:   Corinne was seen today for gynecologic exam.    Diagnoses and all orders for this visit:    Pap smear for cervical cancer screening  -     Liquid-Based Pap Smear, Screening    Visit for screening mammogram  -     Mammo Digital Screening Bilat w/ Ryley; Future    Pap today  MG ordered  Call clinic with any vaginal bleeding which would be abnormal  Encouraged well balanced diet and exercise 3-4x per week; use of handheld and fan at bedside, keep home cooled. Ice packs. Encouraged wearing layers to remove as needed, light colored clothes and linen fabrics. Avoid spicy foods and excessive caffeine use. Maintain healthy body weight. Quit smoking.  Exercise, yoga, meditation, paced respirations are all good coping techniques.   Discussed medication options including OTC regimens (Estroven, Amberen, black cohosh), HRT, SSRI/SNRI. Declines treatment.  Follow up in about 1 year (around 10/7/2025) for annual exam.

## 2024-10-09 LAB
HIGH RISK HPV: NEGATIVE
PSYCHE PATHOLOGY RESULT: NORMAL

## 2024-11-22 ENCOUNTER — TELEPHONE (OUTPATIENT)
Dept: INTERNAL MEDICINE | Facility: CLINIC | Age: 54
End: 2024-11-22

## 2024-11-22 NOTE — TELEPHONE ENCOUNTER
Called pt regarding VV. LVM to return call to [facility] for appt and/or to r/s.

## 2024-12-09 ENCOUNTER — TELEPHONE (OUTPATIENT)
Dept: GYNECOLOGY | Facility: CLINIC | Age: 54
End: 2024-12-09
Payer: MEDICAID

## 2024-12-09 NOTE — TELEPHONE ENCOUNTER
----- Message from Nalini sent at 12/9/2024  8:11 AM CST -----  Regarding: Patient concern  The patient above called because she is bleeding and would like to come in for a visit as soon as possible because this is not normal for her. Please advise, Thanks.

## 2024-12-09 NOTE — TELEPHONE ENCOUNTER
Called patient and she stated that she started bleeding on Friday. Patient stated it was heavy when it started but only a constant flow today. Patient stated she was changing a pad every 2 hours prior and only once today so far. Patient accepted an appointment on Wednesday at 950.

## 2024-12-10 ENCOUNTER — TELEPHONE (OUTPATIENT)
Dept: INTERNAL MEDICINE | Facility: CLINIC | Age: 54
End: 2024-12-10
Payer: MEDICAID

## 2024-12-11 ENCOUNTER — OFFICE VISIT (OUTPATIENT)
Dept: GYNECOLOGY | Facility: CLINIC | Age: 54
End: 2024-12-11
Payer: MEDICAID

## 2024-12-11 VITALS
RESPIRATION RATE: 20 BRPM | TEMPERATURE: 99 F | SYSTOLIC BLOOD PRESSURE: 130 MMHG | DIASTOLIC BLOOD PRESSURE: 94 MMHG | HEART RATE: 86 BPM | BODY MASS INDEX: 39.14 KG/M2 | HEIGHT: 67 IN | WEIGHT: 249.38 LBS | OXYGEN SATURATION: 100 %

## 2024-12-11 DIAGNOSIS — N95.0 POSTMENOPAUSAL BLEEDING: Primary | ICD-10-CM

## 2024-12-11 DIAGNOSIS — Z91.199 NONCOMPLIANCE: ICD-10-CM

## 2024-12-11 LAB
BILIRUB UR QL STRIP: NEGATIVE
C TRACH DNA SPEC QL NAA+PROBE: NOT DETECTED
CLUE CELLS VAG QL WET PREP: NORMAL
GLUCOSE UR QL STRIP: NEGATIVE
KETONES UR QL STRIP: NEGATIVE
LEUKOCYTE ESTERASE UR QL STRIP: NEGATIVE
N GONORRHOEA DNA SPEC QL NAA+PROBE: NOT DETECTED
PH, POC UA: 5.5
POC BLOOD, URINE: POSITIVE
POC NITRATES, URINE: NEGATIVE
PROT UR QL STRIP: NEGATIVE
SOURCE (OHS): NORMAL
SP GR UR STRIP: 1.02 (ref 1–1.03)
T VAGINALIS VAG QL WET PREP: NORMAL
UROBILINOGEN UR STRIP-ACNC: 0.2 (ref 0.1–1.1)
WBC #/AREA VAG WET PREP: NORMAL
YEAST SPEC QL WET PREP: NORMAL

## 2024-12-11 PROCEDURE — 87210 SMEAR WET MOUNT SALINE/INK: CPT | Performed by: NURSE PRACTITIONER

## 2024-12-11 PROCEDURE — 3044F HG A1C LEVEL LT 7.0%: CPT | Mod: CPTII,,, | Performed by: NURSE PRACTITIONER

## 2024-12-11 PROCEDURE — 87491 CHLMYD TRACH DNA AMP PROBE: CPT | Performed by: NURSE PRACTITIONER

## 2024-12-11 PROCEDURE — 99214 OFFICE O/P EST MOD 30 MIN: CPT | Mod: PBBFAC | Performed by: NURSE PRACTITIONER

## 2024-12-11 PROCEDURE — 3080F DIAST BP >= 90 MM HG: CPT | Mod: CPTII,,, | Performed by: NURSE PRACTITIONER

## 2024-12-11 PROCEDURE — 3066F NEPHROPATHY DOC TX: CPT | Mod: CPTII,,, | Performed by: NURSE PRACTITIONER

## 2024-12-11 PROCEDURE — 81003 URINALYSIS AUTO W/O SCOPE: CPT | Mod: PBBFAC | Performed by: NURSE PRACTITIONER

## 2024-12-11 PROCEDURE — 99213 OFFICE O/P EST LOW 20 MIN: CPT | Mod: S$PBB,,, | Performed by: NURSE PRACTITIONER

## 2024-12-11 PROCEDURE — 3061F NEG MICROALBUMINURIA REV: CPT | Mod: CPTII,,, | Performed by: NURSE PRACTITIONER

## 2024-12-11 PROCEDURE — 1159F MED LIST DOCD IN RCRD: CPT | Mod: CPTII,,, | Performed by: NURSE PRACTITIONER

## 2024-12-11 PROCEDURE — 3075F SYST BP GE 130 - 139MM HG: CPT | Mod: CPTII,,, | Performed by: NURSE PRACTITIONER

## 2024-12-11 PROCEDURE — 3008F BODY MASS INDEX DOCD: CPT | Mod: CPTII,,, | Performed by: NURSE PRACTITIONER

## 2024-12-11 NOTE — PROGRESS NOTES
"  [facility] -  Gynecology / Women's Health Clinic      Subjective:       Patient ID: Joyce Marie Boast is a 54 y.o. female.    Chief Complaint:  Gynecologic Exam    History of Present Illness  The patient  here for PMB. Pt is postmenopausal since 2023. Hx of  AUB-O with GYN in . Last pap -NIL and HPV neg. Pt currently prescribed Ozempic, Metformin and Norvasc, states not taking meds for a few months. Spotting started on Friday, which progressed to bleeding like a normal cycle and light bleeding today. Last sexual intercourse was a few weeks ago. BP-155/91.    GYN & OB History  Patient's last menstrual period was 2022 (approximate).   Date of Last Pap: 10/9/2024    Review of patient's allergies indicates:  No Known Allergies  Past Medical History:   Diagnosis Date    Diabetes mellitus, type 2     Hyperlipidemia     Hypertension      OB History    Para Term  AB Living   2 2       2   SAB IAB Ectopic Multiple Live Births                  # Outcome Date GA Lbr Akira/2nd Weight Sex Type Anes PTL Lv   2 Para            1 Para                 Review of Systems  Review of Systems    Negative except for pertinent findings for positives per HPI     Objective:    Physical Exam    BP (!) 155/91 (BP Location: Left arm, Patient Position: Sitting)   Pulse 86   Temp 98.6 °F (37 °C) (Oral)   Resp 20   Ht 5' 7" (1.702 m)   Wt 113.1 kg (249 lb 6.4 oz)   LMP 2022 (Approximate)   SpO2 100%   BMI 39.06 kg/m²   GENERAL: Well-developed female. No acute distress.    SKIN: Normal to inspection, warm and intact.  VULVA: General appearance normal; external genitalia with no lesions or erythema.  VAGINA: Mucosa/vaginal vault atrophic, scant blood in vaginal vault  CERVIX: atrophic, parous appearing os, no erythema or abnormal discharge.  BIMANUAL EXAM: reveals a 12 week-sized uterus. The uterus is non tender. Toby adnexa reveal no tenderness.  PSYCHIATRIC: Patient is oriented to " person, place, and time. Mood and affect are normal.    Assessment:         ICD-10-CM ICD-9-CM   1. Postmenopausal bleeding  N95.0 627.1   2. Noncompliance  Z91.199 V15.81     Plan:   Corinne was seen today for gynecologic exam.    Diagnoses and all orders for this visit:    Postmenopausal bleeding  -     Chlamydia/GC, PCR  -     Wet Prep, Genital  -     US Pelvis Comp with Transvag NON-OB (xpd; Future  -     POCT Urinalysis, Dipstick, Automated, W/O Scope    Noncompliance    Pelvic today, wet prep and g/c, urine dip-trace blood  Pelvic uls ordered  Schedule with GYN for EMB for PMB  Pt strongly encouraged to restart DM and HTN medications as prescribed.  Follow up in about 1 year (around 12/11/2025) for annual exam.

## 2024-12-27 ENCOUNTER — HOSPITAL ENCOUNTER (OUTPATIENT)
Dept: RADIOLOGY | Facility: HOSPITAL | Age: 54
Discharge: HOME | End: 2024-12-27
Attending: NURSE PRACTITIONER
Payer: MEDICAID

## 2024-12-27 DIAGNOSIS — N95.0 POSTMENOPAUSAL BLEEDING: ICD-10-CM

## 2024-12-27 PROCEDURE — 76856 US EXAM PELVIC COMPLETE: CPT | Mod: TC

## 2025-01-30 ENCOUNTER — HOSPITAL ENCOUNTER (OUTPATIENT)
Dept: RADIOLOGY | Facility: HOSPITAL | Age: 55
Discharge: HOME | End: 2025-01-30
Attending: NURSE PRACTITIONER
Payer: MEDICAID

## 2025-01-30 DIAGNOSIS — Z12.31 VISIT FOR SCREENING MAMMOGRAM: ICD-10-CM

## 2025-01-30 PROCEDURE — 77063 BREAST TOMOSYNTHESIS BI: CPT | Mod: TC

## 2025-01-30 PROCEDURE — 77063 BREAST TOMOSYNTHESIS BI: CPT | Mod: 26,,, | Performed by: RADIOLOGY

## 2025-01-30 PROCEDURE — 77067 SCR MAMMO BI INCL CAD: CPT | Mod: 26,,, | Performed by: RADIOLOGY

## 2025-02-18 ENCOUNTER — TELEPHONE (OUTPATIENT)
Dept: INTERNAL MEDICINE | Facility: CLINIC | Age: 55
End: 2025-02-18
Payer: MEDICAID

## 2025-02-26 ENCOUNTER — HOSPITAL ENCOUNTER (OUTPATIENT)
Dept: RADIOLOGY | Facility: HOSPITAL | Age: 55
Discharge: HOME | End: 2025-02-26
Payer: MEDICAID

## 2025-02-26 ENCOUNTER — RESULTS FOLLOW-UP (OUTPATIENT)
Dept: INTERNAL MEDICINE | Facility: CLINIC | Age: 55
End: 2025-02-26

## 2025-02-26 ENCOUNTER — OFFICE VISIT (OUTPATIENT)
Dept: INTERNAL MEDICINE | Facility: CLINIC | Age: 55
End: 2025-02-26
Payer: MEDICAID

## 2025-02-26 VITALS
TEMPERATURE: 98 F | SYSTOLIC BLOOD PRESSURE: 132 MMHG | HEIGHT: 67 IN | OXYGEN SATURATION: 100 % | DIASTOLIC BLOOD PRESSURE: 88 MMHG | WEIGHT: 249.69 LBS | BODY MASS INDEX: 39.19 KG/M2 | RESPIRATION RATE: 16 BRPM | HEART RATE: 93 BPM

## 2025-02-26 DIAGNOSIS — I10 PRIMARY HYPERTENSION: Chronic | ICD-10-CM

## 2025-02-26 DIAGNOSIS — R20.0 ARM NUMBNESS LEFT: ICD-10-CM

## 2025-02-26 DIAGNOSIS — Z79.4 TYPE 2 DIABETES MELLITUS WITH LEFT EYE AFFECTED BY MILD NONPROLIFERATIVE RETINOPATHY WITHOUT MACULAR EDEMA, WITH LONG-TERM CURRENT USE OF INSULIN: Chronic | ICD-10-CM

## 2025-02-26 DIAGNOSIS — E11.3292 TYPE 2 DIABETES MELLITUS WITH LEFT EYE AFFECTED BY MILD NONPROLIFERATIVE RETINOPATHY WITHOUT MACULAR EDEMA, WITH LONG-TERM CURRENT USE OF INSULIN: Chronic | ICD-10-CM

## 2025-02-26 DIAGNOSIS — E78.2 MIXED HYPERLIPIDEMIA: Primary | ICD-10-CM

## 2025-02-26 DIAGNOSIS — M54.2 TENDERNESS OF NECK: ICD-10-CM

## 2025-02-26 PROCEDURE — 3008F BODY MASS INDEX DOCD: CPT | Mod: CPTII,,,

## 2025-02-26 PROCEDURE — 73030 X-RAY EXAM OF SHOULDER: CPT | Mod: TC,LT

## 2025-02-26 PROCEDURE — G2211 COMPLEX E/M VISIT ADD ON: HCPCS | Mod: S$PBB,,,

## 2025-02-26 PROCEDURE — 1160F RVW MEDS BY RX/DR IN RCRD: CPT | Mod: CPTII,,,

## 2025-02-26 PROCEDURE — 99214 OFFICE O/P EST MOD 30 MIN: CPT | Mod: S$PBB,,,

## 2025-02-26 PROCEDURE — 72040 X-RAY EXAM NECK SPINE 2-3 VW: CPT | Mod: TC

## 2025-02-26 PROCEDURE — 3075F SYST BP GE 130 - 139MM HG: CPT | Mod: CPTII,,,

## 2025-02-26 PROCEDURE — 1159F MED LIST DOCD IN RCRD: CPT | Mod: CPTII,,,

## 2025-02-26 PROCEDURE — 99214 OFFICE O/P EST MOD 30 MIN: CPT | Mod: PBBFAC

## 2025-02-26 PROCEDURE — 3079F DIAST BP 80-89 MM HG: CPT | Mod: CPTII,,,

## 2025-02-26 NOTE — ASSESSMENT & PLAN NOTE
A1C at goal 6.2, repeat A1C ordered  Medications- continue metformin and inc ozempic to 2 mg  Follow ADA Diet. Avoid soda, simple sweets, and limit rice/pasta/breads/starches (no more than 45-50 grams per meal).  Maintain healthy weight with goal BMI <30.  Exercise 5 times per week for 30 minutes per day.  Stressed importance of daily foot exams.  Stressed importance of annual dilated eye exam.

## 2025-02-26 NOTE — ASSESSMENT & PLAN NOTE
Watchful waiting, RTC if no improvement in 2 weeks  TFTs ordered  Report new or worsening symptoms

## 2025-02-26 NOTE — PROGRESS NOTES
Daniela Wick, MARIYA   [facility]  [address] [facility] [address]      PATIENT NAME: Joyce Marie Boast  : 1970  DATE: 25  MRN: [id number]      Reason for Visit / Chief Complaint: Follow-up (Pt c/o tender throat x2 weeks , l arm numbness x2 months)       History of Present Illness / Problem Focused Workflow     Joyce Marie Boast presents to the clinic with Follow-up (Pt c/o tender throat x2 weeks , l arm numbness x2 months)     54 y.o.  female presents to clinic. Medical problems include HTN, constipation, AUB, DM, Positive TARA, DANA on CPAP, liposuction of abd (3/28/22) and back (22), bilateral CTR and obesity. Followed by [facility] GYN and rheum clinics and [facility].      24  DM and HTN follow up with labs. A1c at goal 6.2. Lipids above goal, reports compliance with pravastatin. Agreeable to increase dose to 40mg and follow up virtual in 8 weeks. She is complaining of sensitivity to the sun, reports generalized stinging and itching to exposed skin when in the sun for months now. Discussed that some medications can cause photosenstivity and if this worsens with increase in pravastatin dose to RTC sooner. Encouraged sun protection and adequate hydration. BP at goal, meds refilled. Reporting she is unable to sleep with her current CPAP mask which has led to noncompliance, reviewed risk of uncontrolled DANA, she is agreeable to sleep lab referral for assessment and adjustment. Denies acute complaints.     25  Pt presents for complaint of intermittent left arm numbness. She reports there is no precipitating or alleviating factors. She denies numbness elsewhere or weakness. Denies pain. Reviewed I suspect nerve impingment in either her neck or shoulder- agreeable to xrays and PT. She is also she is complaining of external throat tenderness for 2 weeks, exam is unremarkable. She is non tender to  palpation, no lymphadenopathy. Plan will be watchful waiting x 2 weeks and RTC if no improvement. She reports she sometimes takes 2 amlodipine when she has a headache but is not monitoring BP. At goal in clinic, reinforced to take medication as prescribed, she is to monitor BP at home and report readings >140/90. She has not been on ozempic due to insurance change but has not attempted to refill- encouraged her to resume. She missed HLD follow up in November, will complete labs soon and I will send message with results. RTC 6 months for wellness and labs.           Review of Systems     Review of Systems   Constitutional:  Negative for fatigue, fever and unexpected weight change.   HENT:  Negative for ear pain, hearing loss, trouble swallowing and voice change.    Respiratory:  Negative for cough and shortness of breath.    Cardiovascular:  Negative for chest pain and palpitations.   Gastrointestinal:  Negative for abdominal pain, diarrhea and vomiting.   Genitourinary:  Negative for dysuria.   Musculoskeletal:  Negative for gait problem.   Skin:  Negative for rash and wound.   Neurological:  Positive for numbness. Negative for weakness.   Psychiatric/Behavioral:  Negative for suicidal ideas.          Medications and Allergies     Medications  Medication List with Changes/Refills   Current Medications    AMLODIPINE (NORVASC) 5 MG TABLET    Take 1 tablet (5 mg total) by mouth every evening.    BLOOD SUGAR DIAGNOSTIC STRP    Glucometer Strips, See Instructions, Use to check CBG daily and prn    DICLOFENAC (VOLTAREN) 75 MG EC TABLET    Take 1 tablet (75 mg total) by mouth 2 (two) times daily as needed (pain).    HYDROXYZINE PAMOATE (VISTARIL) 50 MG CAP    Take 1 capsule (50 mg total) by mouth nightly as needed (insomnia).    LANCETS 31 GAUGE MISC    1 lancet  by Misc.(Non-Drug; Combo Route) route Daily.    LANCETS OP (IHEALTH) OP      Glucometer Lancets, See Instructions, Use to CBG daily and prn., # 50 EA, 11  Refill(s), Pharmacy: [id number], 170, cm, Height/Length Dosing, 01/11/22 8:27:00 CST, 117.7, kg, Weight Dosing, 01/11/22 8:27:00 CST    LUBIPROSTONE (AMITIZA) 8 MCG CAP    Take 1 capsule (8 mcg total) by mouth 2 (two) times daily with meals.    METFORMIN (GLUCOPHAGE-XR) 500 MG ER 24HR TABLET    Take 1 tablet (500 mg total) by mouth daily with breakfast.    ONETOUCH ULTRA2 METER MISC    daily as needed.    PRAVASTATIN (PRAVACHOL) 40 MG TABLET    Take 1 tablet (40 mg total) by mouth every evening.    TIZANIDINE (ZANAFLEX) 4 MG TABLET    Take 1 tablet (4 mg total) by mouth every 8 (eight) hours as needed (muscle pain). May cause drowsiness    TRUEPLUS LANCETS 33 GAUGE MISC    Apply topically.   Changed and/or Refilled Medications    Modified Medication Previous Medication    SEMAGLUTIDE (OZEMPIC) 2 MG/DOSE (8 MG/3 ML) PNIJ semaglutide (OZEMPIC) 2 mg/dose (8 mg/3 mL) PnIj       Inject 2 mg into the skin every 7 days.    Inject 2 mg into the skin every 7 days.         Allergies  Review of patient's allergies indicates:  No Known Allergies    Physical Examination     Vitals:    02/26/25 1157   BP: 132/88   Pulse: 93   Resp: 16   Temp: 98.1 °F (36.7 °C)     Physical Exam  Vitals and nursing note reviewed.   Constitutional:       General: She is not in acute distress.     Appearance: She is not ill-appearing.   HENT:      Head: Normocephalic and atraumatic.      Mouth/Throat:      Mouth: Mucous membranes are moist.      Pharynx: Oropharynx is clear.   Eyes:      General: No scleral icterus.     Extraocular Movements: Extraocular movements intact.      Conjunctiva/sclera: Conjunctivae normal.      Pupils: Pupils are equal, round, and reactive to light.   Neck:      Vascular: No carotid bruit.   Cardiovascular:      Rate and Rhythm: Normal rate and regular rhythm.      Heart sounds: No murmur heard.     No friction rub. No gallop.   Pulmonary:      Effort: Pulmonary effort is normal. No respiratory distress.       Breath sounds: Normal breath sounds. No wheezing, rhonchi or rales.   Abdominal:      General: Abdomen is flat. Bowel sounds are normal. There is no distension.      Palpations: Abdomen is soft. There is no mass.      Tenderness: There is no abdominal tenderness.   Musculoskeletal:         General: Normal range of motion.      Cervical back: Normal range of motion and neck supple.   Skin:     General: Skin is warm and dry.   Neurological:      General: No focal deficit present.      Mental Status: She is alert.   Psychiatric:         Mood and Affect: Mood normal.           Results     Lab Results   Component Value Date    WBC 9.58 09/27/2024    RBC 5.16 09/27/2024    HGB 14.3 09/27/2024    HCT 42.9 09/27/2024    MCV 83.1 09/27/2024    MCH 27.7 09/27/2024    MCHC 33.3 09/27/2024    RDW 13.5 09/27/2024     09/27/2024    MPV 10.8 (H) 09/27/2024     Sodium   Date Value Ref Range Status   09/27/2024 138 136 - 145 mmol/L Final     Potassium   Date Value Ref Range Status   09/27/2024 4.1 3.5 - 5.1 mmol/L Final     Chloride   Date Value Ref Range Status   09/27/2024 109 (H) 98 - 107 mmol/L Final     CO2   Date Value Ref Range Status   09/27/2024 23 22 - 29 mmol/L Final     Blood Urea Nitrogen   Date Value Ref Range Status   09/27/2024 13.3 9.8 - 20.1 mg/dL Final     Creatinine   Date Value Ref Range Status   09/27/2024 0.92 0.55 - 1.02 mg/dL Final     Calcium   Date Value Ref Range Status   09/27/2024 9.5 8.4 - 10.2 mg/dL Final     Albumin   Date Value Ref Range Status   09/27/2024 3.6 3.5 - 5.0 g/dL Final     Bilirubin Total   Date Value Ref Range Status   09/27/2024 0.4 <=1.5 mg/dL Final     ALP   Date Value Ref Range Status   09/27/2024 91 40 - 150 unit/L Final     AST   Date Value Ref Range Status   09/27/2024 21 5 - 34 unit/L Final     ALT   Date Value Ref Range Status   09/27/2024 20 0 - 55 unit/L Final     Estimated GFR-Non    Date Value Ref Range Status   01/11/2022 80 (L) >>=90  mL/min/1.73 m2 Final     Lab Results   Component Value Date    CHOL 238 (H) 09/27/2024     Lab Results   Component Value Date    HDL 50 09/27/2024     Lab Results   Component Value Date    TRIG 151 (H) 09/27/2024     Lab Results   Component Value Date    VLDL 30 09/27/2024     Lab Results   Component Value Date    .00 (H) 09/27/2024     Lab Results   Component Value Date    TSH 1.698 09/27/2024     Lab Results   Component Value Date    PHUR 5.5 12/11/2024    PROTEINUA Negative 01/08/2024    GLUCUA Normal 01/08/2024    KETONESU Negative 10/01/2021    OCCULTUA Negative 01/08/2024    NITRITE Negative 01/08/2024    LEUKOCYTESUR Negative 01/08/2024     Lab Results   Component Value Date    HGBA1C 6.2 09/27/2024    HGBA1C 7.6 (H) 01/08/2024    HGBA1C 6.5 10/02/2023           Assessment         ICD-10-CM ICD-9-CM   1. Mixed hyperlipidemia  E78.2 272.2   2. Arm numbness left  R20.0 782.0   3. Type 2 diabetes mellitus with left eye affected by mild nonproliferative retinopathy without macular edema, with long-term current use of insulin  E11.3292 250.50    Z79.4 362.04     V58.67   4. Primary hypertension  I10 401.9   5. Tenderness of neck  M54.2 723.1       Plan      Problem List Items Addressed This Visit       Diabetes mellitus (Chronic)    Current Assessment & Plan   A1C at goal 6.2, repeat A1C ordered  Medications- continue metformin and inc ozempic to 2 mg  Follow ADA Diet. Avoid soda, simple sweets, and limit rice/pasta/breads/starches (no more than 45-50 grams per meal).  Maintain healthy weight with goal BMI <30.  Exercise 5 times per week for 30 minutes per day.  Stressed importance of daily foot exams.  Stressed importance of annual dilated eye exam.         Relevant Medications    semaglutide (OZEMPIC) 2 mg/dose (8 mg/3 mL) PnIj    Other Relevant Orders    Ambulatory referral/consult to Ophthalmology    Hemoglobin A1C    CBC Auto Differential    Comprehensive Metabolic Panel    Hemoglobin A1C     Urinalysis, Reflex to Urine Culture    Microalbumin/Creatinine Ratio, Urine    Hypertension (Chronic)    Current Assessment & Plan   At goal- continue amlodipine   Low Sodium Diet (DASH Diet - Less than 2 grams of sodium per day).  Monitor blood pressure daily and log. Report consistent numbers greater than 140/90.  Maintain healthy weight with goal BMI <30. Exercise 30 minutes per day, 5 days per week.         Relevant Orders    TSH    T4, Free    Tenderness of neck    Current Assessment & Plan   Watchful waiting, RTC if no improvement in 2 weeks  TFTs ordered  Report new or worsening symptoms         Relevant Orders    T4, Free    TSH    Arm numbness left    Current Assessment & Plan   Cervical and shoulder xrays  PT referral   RTC PRN         Relevant Orders    X-Ray Cervical Spine 2 or 3 Views    X-Ray Shoulder 2 or More Views Left    Ambulatory Referral/Consult to Physical Therapy/Occupational Therapy    Mixed hyperlipidemia - Primary    Current Assessment & Plan   FLP ordered  Continue pravastatin 40mg  Stressed importance of dietary modifications. Follow a low cholesterol, low saturated fat diet with less that 200mg of cholesterol a day.  Avoid fried foods and high saturated fats (high saturated fats less than 7% of calories).  Add Flax Seed/Fish Oil supplements to diet. Increase dietary fiber.  Regular exercise can reduce LDL and raise HDL. Stressed importance of physical activity 5 times per week for 30 minutes per day.            Relevant Orders    Comprehensive Metabolic Panel    Lipid Panel    Lipid Panel       Future Appointments   Date Time Provider Department Center   3/11/2025  3:00 PM Yenny Logan RD [facility] [place] [facility]   3/14/2025  9:30 AM [facility], [facility] G[facility] [place] [facility]   9/29/2025 12:20 PM Daniela Wick, IMELDAP [facility] IN[facility] [place] [facility]   10/8/2025 10:10 AM Fatuma Laguna, ANP [facility] [place] [facility]            Signature:      [facility]  [address] [facility] [address]    Date of encounter: 2/26/25

## 2025-02-26 NOTE — ASSESSMENT & PLAN NOTE
FLP ordered  Continue pravastatin 40mg  Stressed importance of dietary modifications. Follow a low cholesterol, low saturated fat diet with less that 200mg of cholesterol a day.  Avoid fried foods and high saturated fats (high saturated fats less than 7% of calories).  Add Flax Seed/Fish Oil supplements to diet. Increase dietary fiber.  Regular exercise can reduce LDL and raise HDL. Stressed importance of physical activity 5 times per week for 30 minutes per day.

## 2025-02-27 ENCOUNTER — LAB VISIT (OUTPATIENT)
Dept: LAB | Facility: HOSPITAL | Age: 55
End: 2025-02-27
Payer: MEDICAID

## 2025-02-27 DIAGNOSIS — E11.3292 TYPE 2 DIABETES MELLITUS WITH LEFT EYE AFFECTED BY MILD NONPROLIFERATIVE RETINOPATHY WITHOUT MACULAR EDEMA, WITH LONG-TERM CURRENT USE OF INSULIN: Chronic | ICD-10-CM

## 2025-02-27 DIAGNOSIS — M54.2 TENDERNESS OF NECK: ICD-10-CM

## 2025-02-27 DIAGNOSIS — E78.2 MIXED HYPERLIPIDEMIA: ICD-10-CM

## 2025-02-27 DIAGNOSIS — Z79.4 TYPE 2 DIABETES MELLITUS WITH LEFT EYE AFFECTED BY MILD NONPROLIFERATIVE RETINOPATHY WITHOUT MACULAR EDEMA, WITH LONG-TERM CURRENT USE OF INSULIN: Chronic | ICD-10-CM

## 2025-02-27 LAB
ALBUMIN SERPL-MCNC: 3.3 G/DL (ref 3.5–5)
ALBUMIN/GLOB SERPL: 0.6 RATIO (ref 1.1–2)
ALP SERPL-CCNC: 90 UNIT/L (ref 40–150)
ALT SERPL-CCNC: 16 UNIT/L (ref 0–55)
ANION GAP SERPL CALC-SCNC: 6 MEQ/L
AST SERPL-CCNC: 15 UNIT/L (ref 5–34)
BILIRUB SERPL-MCNC: 0.3 MG/DL
BUN SERPL-MCNC: 16.7 MG/DL (ref 9.8–20.1)
CALCIUM SERPL-MCNC: 9.3 MG/DL (ref 8.4–10.2)
CHLORIDE SERPL-SCNC: 110 MMOL/L (ref 98–107)
CHOLEST SERPL-MCNC: 160 MG/DL
CHOLEST/HDLC SERPL: 4 {RATIO} (ref 0–5)
CO2 SERPL-SCNC: 24 MMOL/L (ref 22–29)
CREAT SERPL-MCNC: 0.86 MG/DL (ref 0.55–1.02)
CREAT/UREA NIT SERPL: 19
EST. AVERAGE GLUCOSE BLD GHB EST-MCNC: 157.1 MG/DL
GFR SERPLBLD CREATININE-BSD FMLA CKD-EPI: >60 ML/MIN/1.73/M2
GLOBULIN SER-MCNC: 5.1 GM/DL (ref 2.4–3.5)
GLUCOSE SERPL-MCNC: 136 MG/DL (ref 74–100)
HBA1C MFR BLD: 7.1 %
HDLC SERPL-MCNC: 41 MG/DL (ref 35–60)
LDLC SERPL CALC-MCNC: 103 MG/DL (ref 50–140)
POTASSIUM SERPL-SCNC: 4.4 MMOL/L (ref 3.5–5.1)
PROT SERPL-MCNC: 8.4 GM/DL (ref 6.4–8.3)
SODIUM SERPL-SCNC: 140 MMOL/L (ref 136–145)
T4 FREE SERPL-MCNC: 1.34 NG/DL (ref 0.7–1.48)
TRIGL SERPL-MCNC: 82 MG/DL (ref 37–140)
TSH SERPL-ACNC: 0.47 UIU/ML (ref 0.35–4.94)
VLDLC SERPL CALC-MCNC: 16 MG/DL

## 2025-02-27 PROCEDURE — 84443 ASSAY THYROID STIM HORMONE: CPT

## 2025-02-27 PROCEDURE — 80061 LIPID PANEL: CPT

## 2025-02-27 PROCEDURE — 83036 HEMOGLOBIN GLYCOSYLATED A1C: CPT

## 2025-02-27 PROCEDURE — 80053 COMPREHEN METABOLIC PANEL: CPT

## 2025-02-27 PROCEDURE — 84439 ASSAY OF FREE THYROXINE: CPT

## 2025-02-27 PROCEDURE — 36415 COLL VENOUS BLD VENIPUNCTURE: CPT

## 2025-05-22 DIAGNOSIS — G47.01 INSOMNIA DUE TO MEDICAL CONDITION: Chronic | ICD-10-CM

## 2025-05-22 RX ORDER — HYDROXYZINE PAMOATE 50 MG/1
50 CAPSULE ORAL NIGHTLY PRN
Qty: 30 CAPSULE | Refills: 4 | Status: SHIPPED | OUTPATIENT
Start: 2025-05-22

## 2025-05-28 ENCOUNTER — PROCEDURE VISIT (OUTPATIENT)
Dept: GYNECOLOGY | Facility: CLINIC | Age: 55
End: 2025-05-28
Payer: MEDICAID

## 2025-05-28 VITALS
WEIGHT: 248.38 LBS | OXYGEN SATURATION: 99 % | RESPIRATION RATE: 18 BRPM | DIASTOLIC BLOOD PRESSURE: 85 MMHG | SYSTOLIC BLOOD PRESSURE: 122 MMHG | TEMPERATURE: 98 F | HEART RATE: 87 BPM | BODY MASS INDEX: 38.98 KG/M2 | HEIGHT: 67 IN

## 2025-05-28 DIAGNOSIS — Z12.11 COLON CANCER SCREENING: ICD-10-CM

## 2025-05-28 DIAGNOSIS — N95.0 POSTMENOPAUSAL BLEEDING: Primary | ICD-10-CM

## 2025-05-28 LAB
BACTERIA #/AREA URNS AUTO: ABNORMAL /HPF
BILIRUB UR QL STRIP.AUTO: NEGATIVE
CLARITY UR: CLEAR
COLOR UR AUTO: ABNORMAL
GLUCOSE UR QL STRIP: NORMAL
HGB UR QL STRIP: NEGATIVE
HYALINE CASTS #/AREA URNS LPF: ABNORMAL /LPF
KETONES UR QL STRIP: NEGATIVE
LEUKOCYTE ESTERASE UR QL STRIP: NEGATIVE
NITRITE UR QL STRIP: NEGATIVE
PH UR STRIP: 5.5 [PH]
PROT UR QL STRIP: NEGATIVE
RBC #/AREA URNS AUTO: ABNORMAL /HPF
SP GR UR STRIP.AUTO: 1.02 (ref 1–1.03)
SQUAMOUS #/AREA URNS LPF: ABNORMAL /HPF
UROBILINOGEN UR STRIP-ACNC: NORMAL
WBC #/AREA URNS AUTO: ABNORMAL /HPF

## 2025-05-28 PROCEDURE — 81015 MICROSCOPIC EXAM OF URINE: CPT

## 2025-05-29 ENCOUNTER — RESULTS FOLLOW-UP (OUTPATIENT)
Dept: GYNECOLOGY | Facility: CLINIC | Age: 55
End: 2025-05-29

## 2025-05-29 NOTE — PROGRESS NOTES
[facility] GYNECOLOGY CLINIC NOTE     Joyce Marie Boast is a 55 y.o.  presenting to GYN clinic for postmenopausal bleeding.    Has routine care w GYN NP. Menopause since 2023 but had 1 week og PMB in 2024. NP started  eval and referred to GYN. Wet prep and GCCT negative, recent pap in 10/2024 NILM HPV negative. US +blood but was collected while actively bleeding. Pelvic US shows 10 cm uterus with 3mm EMS. She denies any further bleeding episodes. Had cologuard neg in .  Reports she has had no further episodes of PMB.    OB History          2    Para   2    Term                AB        Living   2         SAB        IAB        Ectopic        Multiple        Live Births                    Past Medical History:   Diagnosis Date    Diabetes mellitus, type 2     Hyperlipidemia     Hypertension       Past Surgical History:   Procedure Laterality Date    CARPAL TUNNEL RELEASE Bilateral     TUBAL LIGATION        Current Outpatient Medications   Medication Instructions    amLODIPine (NORVASC) 5 mg, Oral, Nightly    blood sugar diagnostic Strp Glucometer Strips, See Instructions, Use to check CBG daily and prn    diclofenac (VOLTAREN) 75 mg, Oral, 2 times daily PRN    hydrOXYzine pamoate (VISTARIL) 50 mg, Oral, Nightly PRN    lancets 31 gauge Misc 1 lancet , Misc.(Non-Drug; Combo Route), Daily    lancets OP (IHEALTH) OP   Glucometer Lancets, See Instructions, Use to CBG daily and prn., # 50 EA, 11 Refill(s), Pharmacy: [facility] #[id number], 170, cm, Height/Length Dosing, 22 8:27:00 CST, 117.7, kg, Weight Dosing, 22 8:27:00 CST    lubiprostone (AMITIZA) 8 mcg, Oral, 2 times daily with meals    metFORMIN (GLUCOPHAGE-XR) 500 mg, Oral, With breakfast    ONETOUCH ULTRA2 METER Misc Daily PRN    pravastatin (PRAVACHOL) 40 mg, Oral, Nightly    semaglutide (OZEMPIC) 2 mg, Subcutaneous, Every 7 days    tiZANidine (ZANAFLEX) 4 mg, Oral, Every 8 hours PRN, May cause drowsiness    TRUEPLUS LANCETS  "33 gauge Misc Apply topically.     Social History[1]    Review of Systems  Pertinent items are noted in HPI.     Objective:     /85 (BP Location: Left arm, Patient Position: Sitting)   Pulse 87   Temp 98.1 °F (36.7 °C) (Oral)   Resp 18   Ht 5' 7" (1.702 m)   Wt 112.7 kg (248 lb 6.4 oz)   LMP 2022 (Approximate)   SpO2 99%   BMI 38.90 kg/m²   Physical Exam:  Gen: Well-nourished, well-developed female appearing stated age. Alert, cooperative, in no acute distress.  CV: Regular rate  Chest: No increased work of breathing      Relevant Imaging:  Pelvic Us 2024  FINDINGS:  UTERUS/CERVIX:  Size: 10 x 5 x 5 cm  Masses: Heterogeneous myometrium.  There is a 1.2 cm intramural fibroid anteriorly.  Endometrium: 3 mm.  Thickened appearance of the junctional zone.  There are nabothian cysts of the cervix.  RIGHT OVARY:  Attempted visualization of the right ovary.  Right ovary not seen for unknown reason, possibly due to shadowing bowel-gas and/or body habitus.  LEFT OVARY:  Attempted visualization of the left ovary.  Left ovary not seen for unknown reason, possibly due to shadowing bowel gas and/or body habitus.  FREE FLUID:  None   Impression:  Small myometrial fibroid  Thickened junctional zone    Assessment:       55 y.o.  here for PMB.  1. Postmenopausal bleeding  Urinalysis, Reflex to Urine Culture Urine, Clean Catch    Ambulatory referral/consult to Gastroenterology      2. Colon cancer screening  Ambulatory referral/consult to Gastroenterology        Plan:     Problem List Items Addressed This Visit    None  Visit Diagnoses         Postmenopausal bleeding    -  Primary    Relevant Orders    Urinalysis, Reflex to Urine Culture Urine, Clean Catch (Completed)    Ambulatory referral/consult to Gastroenterology      Colon cancer screening        Relevant Orders    Ambulatory referral/consult to Gastroenterology          Had a long discussion with patient regarding plan of either perform EMB today, or " defer given single episode of PMB and US demonstrating thin EMS below 4mm threshold. Decision made to defer EMB. Patient instructed to inform clinic immediately if she has any further PMB at which point will move forward with endometrial sampling.  Suspect UA blood on previous sample a contaminant, repeat today.  Colonoscopy referral placed.    Return to clinic wwe w NP sched 10/2025 or sooner prn    Discussed patient and plan with Dr. Provost Beth Wilcox MD  [employer] [facility] PGY4       [1]   Social History  Tobacco Use    Smoking status: Never     Passive exposure: Never    Smokeless tobacco: Never   Substance Use Topics    Alcohol use: Yes     Comment: wine on occ    Drug use: Never

## 2025-05-30 ENCOUNTER — TELEPHONE (OUTPATIENT)
Dept: GYNECOLOGY | Facility: CLINIC | Age: 55
End: 2025-05-30
Payer: MEDICAID

## 2025-05-30 NOTE — TELEPHONE ENCOUNTER
----- Message from Beth Wilcox sent at 5/29/2025  7:50 PM CDT -----  Please call and let patient know UA looks fine, no blood in urine. Thanks!  ----- Message -----  From: Lab, Background User  Sent: 5/28/2025   2:38 PM CDT  To: Beth Wilcox MD

## 2025-06-02 DIAGNOSIS — Z12.11 COLON CANCER SCREENING: Primary | ICD-10-CM

## 2025-06-02 RX ORDER — POLYETHYLENE GLYCOL 3350, SODIUM SULFATE, SODIUM CHLORIDE, POTASSIUM CHLORIDE, SODIUM ASCORBATE, AND ASCORBIC ACID 7.5-2.691G
KIT ORAL
Qty: 1 KIT | Refills: 0 | Status: SHIPPED | OUTPATIENT
Start: 2025-06-02

## 2025-06-04 ENCOUNTER — TELEPHONE (OUTPATIENT)
Dept: INTERNAL MEDICINE | Facility: CLINIC | Age: 55
End: 2025-06-04
Payer: MEDICAID

## 2025-06-04 DIAGNOSIS — E11.3292 TYPE 2 DIABETES MELLITUS WITH LEFT EYE AFFECTED BY MILD NONPROLIFERATIVE RETINOPATHY WITHOUT MACULAR EDEMA, WITHOUT LONG-TERM CURRENT USE OF INSULIN: Chronic | ICD-10-CM

## 2025-06-04 RX ORDER — METFORMIN HYDROCHLORIDE 500 MG/1
500 TABLET, EXTENDED RELEASE ORAL
Qty: 90 TABLET | Refills: 2 | Status: SHIPPED | OUTPATIENT
Start: 2025-06-04 | End: 2026-03-01

## 2025-07-01 ENCOUNTER — ANESTHESIA EVENT (OUTPATIENT)
Dept: ENDOSCOPY | Facility: HOSPITAL | Age: 55
End: 2025-07-01
Payer: MEDICAID

## 2025-07-01 RX ORDER — SODIUM CHLORIDE 9 MG/ML
INJECTION, SOLUTION INTRAVENOUS CONTINUOUS
Status: CANCELLED | OUTPATIENT
Start: 2025-07-01

## 2025-07-01 RX ORDER — LIDOCAINE HYDROCHLORIDE 10 MG/ML
0.5 INJECTION, SOLUTION EPIDURAL; INFILTRATION; INTRACAUDAL; PERINEURAL ONCE
Status: CANCELLED | OUTPATIENT
Start: 2025-07-01 | End: 2025-07-01

## 2025-07-01 RX ORDER — LORAZEPAM 2 MG/ML
0.25 INJECTION INTRAMUSCULAR ONCE AS NEEDED
Status: CANCELLED | OUTPATIENT
Start: 2025-07-01 | End: 2036-11-27

## 2025-07-01 RX ORDER — ONDANSETRON HYDROCHLORIDE 2 MG/ML
4 INJECTION, SOLUTION INTRAVENOUS ONCE AS NEEDED
Status: CANCELLED | OUTPATIENT
Start: 2025-07-01 | End: 2036-11-27

## 2025-07-01 RX ORDER — GLUCAGON 1 MG
1 KIT INJECTION
Status: CANCELLED | OUTPATIENT
Start: 2025-07-01

## 2025-07-01 NOTE — ANESTHESIA PREPROCEDURE EVALUATION
07/01/2025  Pre-operative evaluation for Procedure(s) (LRB):  COLONOSCOPY (N/A)    LMP 04/01/2022 (Approximate)     Past Medical History:   Diagnosis Date    Diabetes mellitus, type 2     Hyperlipidemia     Hypertension        Problem List[1]    Review of patient's allergies indicates:  No Known Allergies    Current Outpatient Medications   Medication Instructions    amLODIPine (NORVASC) 5 mg, Oral, Nightly    blood sugar diagnostic Strp Glucometer Strips, See Instructions, Use to check CBG daily and prn    diclofenac (VOLTAREN) 75 mg, Oral, 2 times daily PRN    hydrOXYzine pamoate (VISTARIL) 50 mg, Oral, Nightly PRN    lancets 31 gauge Misc 1 lancet , Misc.(Non-Drug; Combo Route), Daily    lancets OP (IHEALTH) OP   Glucometer Lancets, See Instructions, Use to CBG daily and prn., # 50 EA, 11 Refill(s), Pharmacy: [facility] #[id number], 170, cm, Height/Length Dosing, 01/11/22 8:27:00 CST, 117.7, kg, Weight Dosing, 01/11/22 8:27:00 CST    lubiprostone (AMITIZA) 8 mcg, Oral, 2 times daily with meals    metFORMIN (GLUCOPHAGE-XR) 500 mg, Oral, With breakfast    ONETOUCH ULTRA2 METER Misc Daily PRN    polyethylene glycol (MOVIPREP) 100-7.5-2.691 gram solution Take as directed prior to colonoscopy    pravastatin (PRAVACHOL) 40 mg, Oral, Nightly    semaglutide (OZEMPIC) 2 mg, Subcutaneous, Every 7 days    tiZANidine (ZANAFLEX) 4 mg, Oral, Every 8 hours PRN, May cause drowsiness    TRUEPLUS LANCETS 33 gauge Misc Apply topically.       Past Surgical History:   Procedure Laterality Date    CARPAL TUNNEL RELEASE Bilateral     TUBAL LIGATION           Lab Results   Component Value Date    WBC 9.58 09/27/2024    HGB 14.3 09/27/2024    HCT 42.9 09/27/2024    MCV 83.1 09/27/2024     09/27/2024          BMP  Lab Results   Component Value Date     02/27/2025    K 4.4 02/27/2025    CO2 24 02/27/2025    BUN  "16.7 02/27/2025    CREATININE 0.86 02/27/2025    CALCIUM 9.3 02/27/2025    EGFRNONAA 80 (L) 01/11/2022    BILITOT 0.3 02/27/2025    AST 15 02/27/2025    ALT 16 02/27/2025        INR  No results for input(s): "PT", "INR", "PROTIME", "APTT" in the last 72 hours.        Diagnostic Studies:      EKG:  No results found for this or any previous visit.    No results found for this or any previous visit.              Pre-op Assessment    I have reviewed the Patient Summary Reports.     I have reviewed the Nursing Notes. I have reviewed the NPO Status.   I have reviewed the Medications.     Review of Systems  Anesthesia Hx:  No problems with previous Anesthesia                Social:    Denies Tobacco Use. Denies Alcohol Use.  Denies Illicit Drug Use.  Hematology/Oncology:  Hematology Normal   Oncology Normal                                   EENT/Dental:  EENT/Dental Normal           Cardiovascular:     Hypertension           hyperlipidemia       Functional Capacity good / => 4 METS                         Pulmonary:        Sleep Apnea                Musculoskeletal:  Musculoskeletal Normal                Neurological:  Neurology Normal                                      Endocrine:  Diabetes           Dermatological:  Skin Normal    Psych:  Psychiatric Normal                    Physical Exam  General: Well nourished, Cooperative, Alert and Oriented    Airway:  Mallampati: III   Mouth Opening: Normal  Tongue: Normal  Neck ROM: Normal ROM    Dental:  Intact        Anesthesia Plan  Type of Anesthesia, risks & benefits discussed:    Anesthesia Type: MAC  Intra-op Monitoring Plan: Standard ASA Monitors  Induction:  IV  Informed Consent: Informed consent signed with the Patient and all parties understand the risks and agree with anesthesia plan.  All questions answered. Patient consented to blood products? Yes  ASA Score: 2  Day of Surgery Review of History & Physical: H&P Update referred to the surgeon/provider.  Anesthesia " Plan Notes: EKG ordered pre-op    Ready For Surgery From Anesthesia Perspective.     .           [1]   Patient Active Problem List  Diagnosis    Abnormal uterine bleeding    Constipation    Diabetes mellitus    Hypertension    Obstructive sleep apnea syndrome    Mild nonproliferative diabetic retinopathy of left eye without macular edema associated with type 2 diabetes mellitus    Heat rash    Noncompliance    Class 2 severe obesity due to excess calories with serious comorbidity and body mass index (BMI) of 37.0 to 37.9 in adult    Joint pain in fingers of both hands    Positive TARA (antinuclear antibody)    Insomnia due to medical condition    Tenderness of neck    Arm numbness left    Mixed hyperlipidemia

## 2025-07-02 ENCOUNTER — HOSPITAL ENCOUNTER (OUTPATIENT)
Facility: HOSPITAL | Age: 55
Discharge: HOME | End: 2025-07-02
Attending: INTERNAL MEDICINE | Admitting: INTERNAL MEDICINE

## 2025-07-02 ENCOUNTER — ANESTHESIA (OUTPATIENT)
Dept: ENDOSCOPY | Facility: HOSPITAL | Age: 55
End: 2025-07-02
Payer: MEDICAID

## 2025-07-02 VITALS
HEART RATE: 81 BPM | HEIGHT: 67 IN | TEMPERATURE: 98 F | SYSTOLIC BLOOD PRESSURE: 123 MMHG | BODY MASS INDEX: 37.95 KG/M2 | OXYGEN SATURATION: 98 % | RESPIRATION RATE: 18 BRPM | WEIGHT: 241.81 LBS | DIASTOLIC BLOOD PRESSURE: 78 MMHG

## 2025-07-02 DIAGNOSIS — I10 HYPERTENSION: ICD-10-CM

## 2025-07-02 DIAGNOSIS — Z12.11 SCREEN FOR COLON CANCER: ICD-10-CM

## 2025-07-02 LAB
B-HCG UR QL: NEGATIVE
CTP QC/QA: YES
OHS QRS DURATION: 80 MS
OHS QTC CALCULATION: 448 MS
POCT GLUCOSE: 205 MG/DL (ref 70–110)

## 2025-07-02 PROCEDURE — 37000008 HC ANESTHESIA 1ST 15 MINUTES: Performed by: INTERNAL MEDICINE

## 2025-07-02 PROCEDURE — 82962 GLUCOSE BLOOD TEST: CPT | Performed by: INTERNAL MEDICINE

## 2025-07-02 PROCEDURE — 81025 URINE PREGNANCY TEST: CPT | Performed by: ANESTHESIOLOGY

## 2025-07-02 PROCEDURE — 37000009 HC ANESTHESIA EA ADD 15 MINS: Performed by: INTERNAL MEDICINE

## 2025-07-02 PROCEDURE — 45385 COLONOSCOPY W/LESION REMOVAL: CPT | Performed by: INTERNAL MEDICINE

## 2025-07-02 PROCEDURE — 63600175 PHARM REV CODE 636 W HCPCS: Performed by: ANESTHESIOLOGY

## 2025-07-02 PROCEDURE — 63600175 PHARM REV CODE 636 W HCPCS: Performed by: NURSE ANESTHETIST, CERTIFIED REGISTERED

## 2025-07-02 PROCEDURE — 93005 ELECTROCARDIOGRAM TRACING: CPT

## 2025-07-02 PROCEDURE — 27201423 OPTIME MED/SURG SUP & DEVICES STERILE SUPPLY: Performed by: INTERNAL MEDICINE

## 2025-07-02 PROCEDURE — 88305 TISSUE EXAM BY PATHOLOGIST: CPT | Mod: TC | Performed by: INTERNAL MEDICINE

## 2025-07-02 RX ORDER — LIDOCAINE HYDROCHLORIDE 20 MG/ML
INJECTION INTRAVENOUS
Status: DISCONTINUED | OUTPATIENT
Start: 2025-07-02 | End: 2025-07-02

## 2025-07-02 RX ORDER — PROPOFOL 10 MG/ML
INJECTION, EMULSION INTRAVENOUS
Status: DISCONTINUED | OUTPATIENT
Start: 2025-07-02 | End: 2025-07-02

## 2025-07-02 RX ORDER — SODIUM CHLORIDE, SODIUM LACTATE, POTASSIUM CHLORIDE, CALCIUM CHLORIDE 600; 310; 30; 20 MG/100ML; MG/100ML; MG/100ML; MG/100ML
INJECTION, SOLUTION INTRAVENOUS
Status: DISCONTINUED | OUTPATIENT
Start: 2025-07-02 | End: 2025-07-02 | Stop reason: HOSPADM

## 2025-07-02 RX ADMIN — LIDOCAINE HYDROCHLORIDE 50 MG: 20 INJECTION INTRAVENOUS at 07:07

## 2025-07-02 RX ADMIN — SODIUM CHLORIDE, POTASSIUM CHLORIDE, SODIUM LACTATE AND CALCIUM CHLORIDE: 600; 310; 30; 20 INJECTION, SOLUTION INTRAVENOUS at 07:07

## 2025-07-02 RX ADMIN — PROPOFOL 120 MCG/KG/MIN: 10 INJECTION, EMULSION INTRAVENOUS at 07:07

## 2025-07-02 NOTE — PROVATION PATIENT INSTRUCTIONS
Discharge Summary/Instructions after an Endoscopic Procedure  Patient Name: Joyce Boast  Patient MRN: [id number]  Patient YOB: 1970 Wednesday, July 2, 2025  SIVAN Ferrari MD  Dear patient,  As a result of recent federal legislation (The Federal Cures Act), you may   receive lab or pathology results from your procedure in your [facility]   account before your physician is able to contact you. Your physician or   their representative will relay the results to you with their   recommendations at their soonest availability.  Thank you,  RESTRICTIONS:  During your procedure today, you received medications for sedation.  These   medications may affect your judgment, balance and coordination.  Therefore,   for 24 hours, you have the following restrictions:   - DO NOT drive a car, operate machinery, make legal/financial decisions,   sign important papers or drink alcohol.    ACTIVITY:  Today: no heavy lifting, straining or running due to procedural   sedation/anesthesia.  The following day: return to full activity including work.  DIET:  Eat and drink normally unless instructed otherwise.     TREATMENT FOR COMMON SIDE EFFECTS:  - Mild abdominal pain, nausea, belching, bloating or excessive gas:  rest,   eat lightly and use a heating pad.  - Sore Throat: treat with throat lozenges and/or gargle with warm salt   water.  - Because air was used during the procedure, expelling large amounts of air   from your rectum or belching is normal.  - If a bowel prep was taken, you may not have a bowel movement for 1-3 days.    This is normal.  SYMPTOMS TO WATCH FOR AND REPORT TO YOUR PHYSICIAN:  1. Abdominal pain or bloating, other than gas cramps.  2. Chest pain.  3. Back pain.  4. Signs of infection such as: chills or fever occurring within 24 hours   after the procedure.  5. Rectal bleeding, which would show as bright red, maroon, or black stools.   (A tablespoon of blood from the rectum is not serious, especially if    hemorrhoids are present.)  6. Vomiting.  7. Weakness or dizziness.  GO DIRECTLY TO THE NEAREST EMERGENCY ROOM IF YOU HAVE ANY OF THE FOLLOWING:      Difficulty breathing              Chills and/or fever over 101 F   Persistent vomiting and/or vomiting blood   Severe abdominal pain   Severe chest pain   Black, tarry stools   Bleeding- more than one tablespoon   Any other symptom or condition that you feel may need urgent attention  Your doctor recommends these additional instructions:  If any biopsies were taken, your doctors clinic will contact you in 1 to 2   weeks with any results.  Recommendations:  - Resume previous diet.   - Continue present medications.   - Discharge patient to home (via wheelchair).   - Repeat colonoscopy for surveillance based on pathology results.  Impressions:  - Preparation of the colon was fair.   - One 11 mm polyp in the cecum, removed with a cold snare.  Resected and   retrieved.  For questions, problems or results please call your physician - SIVAN Ferrari MD at Work:  [phone].  [facility] , EMERGENCY ROOM PHONE NUMBER: [phone]  IF A COMPLICATION OR EMERGENCY SITUATION ARISES AND YOU ARE UNABLE TO REACH   YOUR PHYSICIAN - GO DIRECTLY TO THE EMERGENCY ROOM.  SIVAN Ferrari MD  7/2/2025 7:49:24 AM  This report has been verified and signed electronically.  Dear patient,  As a result of recent federal legislation (The Federal Cures Act), you may   receive lab or pathology results from your procedure in your [facility]   account before your physician is able to contact you. Your physician or   their representative will relay the results to you with their   recommendations at their soonest availability.  Thank you,  PROVATION

## 2025-07-02 NOTE — TRANSFER OF CARE
"Anesthesia Transfer of Care Note    Patient: Joyce Marie Boast    Procedure(s) Performed: Procedure(s) (LRB):  COLONOSCOPY (N/A)    Patient location: GI    Anesthesia Type: general    Transport from OR: Transported from OR on room air with adequate spontaneous ventilation    Post pain: adequate analgesia    Post assessment: no apparent anesthetic complications    Post vital signs: stable    Level of consciousness: awake    Nausea/Vomiting: no nausea/vomiting    Complications: none    Transfer of care protocol was followed      Last vitals: Visit Vitals  BP (!) 133/93 (BP Location: Left arm, Patient Position: Lying)   Pulse 76   Temp 36.5 °C (97.7 °F) (Oral)   Resp 16   Ht 5' 7" (1.702 m)   Wt 109.7 kg (241 lb 12.8 oz)   LMP 04/01/2022 (Approximate)   SpO2 98%   Breastfeeding No   BMI 37.87 kg/m²     "

## 2025-07-02 NOTE — ANESTHESIA POSTPROCEDURE EVALUATION
Anesthesia Post Evaluation    Patient: Joyce Marie Boast    Procedure(s) Performed: Procedure(s) (LRB):  COLONOSCOPY (N/A)    Final Anesthesia Type: general      Patient location during evaluation: GI PACU  Patient participation: Yes- Able to Participate  Level of consciousness: awake and alert  Post-procedure vital signs: reviewed and stable  Pain management: adequate  Airway patency: patent    PONV status at discharge: No PONV  Anesthetic complications: no      Cardiovascular status: blood pressure returned to baseline  Respiratory status: unassisted  Hydration status: euvolemic  Follow-up not needed.              Vitals Value Taken Time   /93 07/02/25 07:06   Temp 36.5 °C (97.7 °F) 07/02/25 07:06   Pulse 76 07/02/25 07:06   Resp 16 07/02/25 07:06   SpO2 98 % 07/02/25 07:06         No case tracking events are documented in the log.      Pain/Barrett Score: No data recorded

## 2025-07-02 NOTE — LETTER
July 2, 2025    Joyce Marie Boast  [address] [address]                   [address] [facility] [address]  Phone: [phone]  Fax: [phone]   July 2, 2025     Patient: Joyce Marie Boast   YOB: 1970   Date of Visit: 7/2/2025       To Whom it May Concern:    Joyce Boast was seen in my clinic on 7/2/2025. She may return to work on 7/3/2025.    Please excuse her from any classes or work missed.    If you have any questions or concerns, please don't hesitate to call.    Sincerely,         Jody Cole, RN

## 2025-07-02 NOTE — PLAN OF CARE
Back from procedure. Awake and alert. Ao4. No acute distress. Dc instructions given and reviewed.sipping juice

## 2025-07-02 NOTE — H&P
Colonoscopy History and Physical    Patient Name: Joyce Marie Boast  MRN: [id number]  : 1970  Date of Procedure:  2025  Referring Physician: VIRA Ferrari MD  Primary Physician: Daniela Wick FNP  Procedure Physician:VIRA Ferrari MD    Procedure - Colonoscopy  ASA - per anesthesia  Mallampati - per anesthesia  History of Anesthesia problems - no  Family history Anesthesia problems -  no   Plan of anesthesia - General    Diagnosis: screening for colon cancer  Chief Complaint: Same as above    HPI: Patient is an 55 y.o. female is here for the above.     Last colonoscopy: none  Family history: neg colon cancer  Anticoagulation: na    ROS:  Constitutional: No fevers, chills, No weight loss  CV: No chest pain  Pulm: No cough, No shortness of breath  GI: see HPI    Medical History:   Past Medical History:   Diagnosis Date    Diabetes mellitus, type 2     Hyperlipidemia     Hypertension          Surgical History:   Past Surgical History:   Procedure Laterality Date    CARPAL TUNNEL RELEASE Bilateral     TUBAL LIGATION         Family History:   Family History   Problem Relation Name Age of Onset    Heart disease Mother      Hypertension Mother      Diabetes Mother      Hypertension Father      Cancer Maternal Aunt      Cancer Maternal Grandmother     .    Social History:   Social History     Socioeconomic History    Marital status: Single    Number of children: 2   Tobacco Use    Smoking status: Never     Passive exposure: Never    Smokeless tobacco: Never   Substance and Sexual Activity    Alcohol use: Yes     Comment: wine on occ    Drug use: Never    Sexual activity: Yes     Birth control/protection: See Surgical Hx     Social Drivers of Health     Financial Resource Strain: Low Risk  (2024)    Overall Financial Resource Strain (CARDIA)     Difficulty of Paying Living Expenses: Not hard at all   Food Insecurity: No Food Insecurity (2024)    Hunger Vital Sign     Worried About Running Out of  Food in the Last Year: Never true     Ran Out of Food in the Last Year: Never true   Transportation Needs: No Transportation Needs (9/4/2024)    TRANSPORTATION NEEDS     Transportation : No   Physical Activity: Inactive (9/4/2024)    Exercise Vital Sign     Days of Exercise per Week: 0 days     Minutes of Exercise per Session: 0 min   Stress: Stress Concern Present (1/8/2024)    [employer] - Occupational Stress Questionnaire     Feeling of Stress : To some extent   Housing Stability: Unknown (9/4/2024)    Housing Stability Vital Sign     Unable to Pay for Housing in the Last Year: No     Homeless in the Last Year: No       Review of patient's allergies indicates:  No Known Allergies    Medications:   Prescriptions Prior to Admission[1]      Physical Exam:    Vital Signs: There were no vitals filed for this visit.  LMP 04/01/2022 (Approximate)     General:          Well appearing in no acute distress  Lungs: Clear to auscultation bilaterally, respirations unlabored  Heart: Regular rate and rhythm, S1 and S2 normal, no obvious murmurs  Abdomen:         Obese Soft, non-tender, bowel sounds normal, no masses, no organomegaly        Labs:  Lab Results   Component Value Date    WBC 9.58 09/27/2024    HGB 14.3 09/27/2024    HCT 42.9 09/27/2024    MCV 83.1 09/27/2024     09/27/2024     Lab Results   Component Value Date    INR 1.02 04/10/2020     Lab Results   Component Value Date     02/27/2025    K 4.4 02/27/2025    CO2 24 02/27/2025    BUN 16.7 02/27/2025    CREATININE 0.86 02/27/2025    ALBUMIN 3.3 (L) 02/27/2025    BILITOT 0.3 02/27/2025    ALKPHOS 90 02/27/2025    ALT 16 02/27/2025    AST 15 02/27/2025         Assessment and Plan:    History reviewed, vital signs satisfactory, cardiopulmonary status satisfactory.  I have explained the sedation options, risks, benefits, and alternatives of this endoscopic procedure to the patient including but not limited to bleeding,  inflammation, infection, perforation, and death.  All questions were answered and the patient consented to proceed with procedure as planned.   The patient is deemed an appropriate candidate for the sedation as planned.      VIRA Ferrari MD    Gastroenterology and Hepatology  [facility]    7/2/2025  6:42 AM         [1]   Medications Prior to Admission   Medication Sig Dispense Refill Last Dose/Taking    amLODIPine (NORVASC) 5 MG tablet Take 1 tablet (5 mg total) by mouth every evening. 90 tablet 3 Taking    metFORMIN (GLUCOPHAGE-XR) 500 MG ER 24hr tablet Take 1 tablet (500 mg total) by mouth daily with breakfast. 90 tablet 2 Taking    pravastatin (PRAVACHOL) 40 MG tablet Take 1 tablet (40 mg total) by mouth every evening. 90 tablet 2 Taking    blood sugar diagnostic Strp Glucometer Strips, See Instructions, Use to check CBG daily and prn 50 strip 11     diclofenac (VOLTAREN) 75 MG EC tablet Take 1 tablet (75 mg total) by mouth 2 (two) times daily as needed (pain). (Patient not taking: Reported on 6/18/2025) 45 tablet 3 Not Taking    hydrOXYzine pamoate (VISTARIL) 50 MG Cap Take 1 capsule (50 mg total) by mouth nightly as needed (insomnia). (Patient not taking: Reported on 6/18/2025) 30 capsule 4 Not Taking    lancets 31 gauge Misc 1 lancet  by Misc.(Non-Drug; Combo Route) route Daily. 50 each 11     lancets OP ([facility]HE[facility]) OP   Glucometer Lancets, See Instructions, Use to CBG daily and prn., # 50 EA, 11 Refill(s), Pharmacy: [id number], 170, cm, Height/Length Dosing, 01/11/22 8:27:00 CST, 117.7, kg, Weight Dosing, 01/11/22 8:27:00 CST       lubiprostone (AMITIZA) 8 MCG Cap Take 1 capsule (8 mcg total) by mouth 2 (two) times daily with meals. (Patient not taking: Reported on 6/18/2025) 60 capsule 6 Not Taking    ONETOUCH ULTRA2 METER Misc daily as needed.       polyethylene glycol (MOVIPREP) 100-7.5-2.691 gram solution Take as directed prior to colonoscopy 1 kit 0      semaglutide (OZEMPIC) 2 mg/dose (8 mg/3 mL) PnIj Inject 2 mg into the skin every 7 days. (Patient not taking: Reported on 6/18/2025) 3 mL 8 Not Taking    tiZANidine (ZANAFLEX) 4 MG tablet Take 1 tablet (4 mg total) by mouth every 8 (eight) hours as needed (muscle pain). May cause drowsiness (Patient not taking: Reported on 6/18/2025) 30 tablet 6 Not Taking    TRUEPLUS LANCETS 33 gauge Misc Apply topically.

## 2025-07-03 LAB
ESTROGEN SERPL-MCNC: NORMAL PG/ML
INSULIN SERPL-ACNC: NORMAL U[IU]/ML
LAB AP CLINICAL INFORMATION: NORMAL
LAB AP GROSS DESCRIPTION: NORMAL
LAB AP REPORT FOOTNOTES: NORMAL

## 2025-07-11 ENCOUNTER — OFFICE VISIT (OUTPATIENT)
Dept: OPHTHALMOLOGY | Facility: CLINIC | Age: 55
End: 2025-07-11
Payer: COMMERCIAL

## 2025-07-11 VITALS — HEIGHT: 67 IN | WEIGHT: 241.88 LBS | BODY MASS INDEX: 37.96 KG/M2

## 2025-07-11 DIAGNOSIS — E11.9 DM TYPE 2 WITHOUT RETINOPATHY: Primary | ICD-10-CM

## 2025-07-11 DIAGNOSIS — H25.13 AGE-RELATED NUCLEAR CATARACT OF BOTH EYES: ICD-10-CM

## 2025-07-11 PROCEDURE — 99213 OFFICE O/P EST LOW 20 MIN: CPT | Mod: PBBFAC,PN

## 2025-07-11 NOTE — PROGRESS NOTES
HPI    New Patient, Diabetic Evaluation  OCT//Optos done today. Patient reports stable vision with readers.  Last edited by Lois Odonnell on 7/11/2025  9:59 AM.            Assessment /Plan     For exam results, see Encounter Report.    Type 2 diabetes mellitus with left eye affected by mild nonproliferative retinopathy without macular edema, with long-term current use of insulin  -     Ambulatory referral/consult to Ophthalmology      1. T2DM without ophthalmologic manifestations OU  - Last A1c as below:  (7.1)  02/27/2025  - No signs of retinopathy on exam  - No signs of DME on OCT mac  - Encouraged good BS/BP/Cholesterol control, f/u with PCP regularly  - optos taken  - Monitor with annual DFE (next due one year)    2. Age related cataracts OU, combined forms  - NVS, CTM  - patient is on semaglutide    RTC one year DFE, OCT Mac.

## (undated) DEVICE — DEFOAMER WATER SOLUBLE ENDO

## (undated) DEVICE — SNARE EXACTO COLD

## (undated) DEVICE — SOL IRRI STRL WATER 1000ML

## (undated) DEVICE — KIT SURGICAL COLON .25 1.1OZ

## (undated) DEVICE — TRAP ETRAP POLYP 50 TRAY

## (undated) DEVICE — MANIFOLD 4 PORT